# Patient Record
Sex: FEMALE | Race: BLACK OR AFRICAN AMERICAN | NOT HISPANIC OR LATINO | URBAN - METROPOLITAN AREA
[De-identification: names, ages, dates, MRNs, and addresses within clinical notes are randomized per-mention and may not be internally consistent; named-entity substitution may affect disease eponyms.]

---

## 2020-08-20 ENCOUNTER — OFFICE VISIT (OUTPATIENT)
Dept: URGENT CARE | Facility: CLINIC | Age: 60
End: 2020-08-20
Payer: MEDICARE

## 2020-08-20 VITALS
TEMPERATURE: 98 F | DIASTOLIC BLOOD PRESSURE: 76 MMHG | RESPIRATION RATE: 18 BRPM | OXYGEN SATURATION: 98 % | HEART RATE: 78 BPM | WEIGHT: 140 LBS | SYSTOLIC BLOOD PRESSURE: 146 MMHG

## 2020-08-20 DIAGNOSIS — N39.0 URINARY TRACT INFECTION WITHOUT HEMATURIA, SITE UNSPECIFIED: Primary | ICD-10-CM

## 2020-08-20 DIAGNOSIS — R30.0 DYSURIA: ICD-10-CM

## 2020-08-20 LAB
BILIRUB UR QL STRIP: NEGATIVE
GLUCOSE UR QL STRIP: NEGATIVE
KETONES UR QL STRIP: NEGATIVE
LEUKOCYTE ESTERASE UR QL STRIP: NEGATIVE
PH, POC UA: 7 (ref 5–8)
POC BLOOD, URINE: NEGATIVE
POC NITRATES, URINE: NEGATIVE
PROT UR QL STRIP: NEGATIVE
SP GR UR STRIP: 1.01 (ref 1–1.03)
UROBILINOGEN UR STRIP-ACNC: NORMAL (ref 0.1–1.1)

## 2020-08-20 PROCEDURE — 81003 URINALYSIS AUTO W/O SCOPE: CPT | Mod: QW,S$GLB,, | Performed by: NURSE PRACTITIONER

## 2020-08-20 PROCEDURE — 99203 OFFICE O/P NEW LOW 30 MIN: CPT | Mod: 25,S$GLB,, | Performed by: NURSE PRACTITIONER

## 2020-08-20 PROCEDURE — 87086 URINE CULTURE/COLONY COUNT: CPT

## 2020-08-20 PROCEDURE — 81003 POCT URINALYSIS, DIPSTICK, AUTOMATED, W/O SCOPE: ICD-10-PCS | Mod: QW,S$GLB,, | Performed by: NURSE PRACTITIONER

## 2020-08-20 PROCEDURE — 99203 PR OFFICE/OUTPT VISIT, NEW, LEVL III, 30-44 MIN: ICD-10-PCS | Mod: 25,S$GLB,, | Performed by: NURSE PRACTITIONER

## 2020-08-20 RX ORDER — CLOPIDOGREL BISULFATE 75 MG/1
75 TABLET ORAL
COMMUNITY
Start: 2020-05-13 | End: 2021-05-13

## 2020-08-20 RX ORDER — GABAPENTIN 400 MG/1
CAPSULE ORAL
COMMUNITY
Start: 2020-07-10

## 2020-08-20 RX ORDER — ATORVASTATIN CALCIUM 40 MG/1
40 TABLET, FILM COATED ORAL
COMMUNITY
Start: 2019-09-06 | End: 2020-09-05

## 2020-08-20 RX ORDER — METOPROLOL SUCCINATE 50 MG/1
50 TABLET, EXTENDED RELEASE ORAL
COMMUNITY

## 2020-08-20 RX ORDER — NITROFURANTOIN 25; 75 MG/1; MG/1
100 CAPSULE ORAL 2 TIMES DAILY
Qty: 10 CAPSULE | Refills: 0 | Status: SHIPPED | OUTPATIENT
Start: 2020-08-20 | End: 2020-08-25

## 2020-08-20 NOTE — PROGRESS NOTES
Subjective:       Patient ID: Daina Ngo is a 60 y.o. female.    Vitals:  weight is 63.5 kg (140 lb). Her temperature is 98.3 °F (36.8 °C). Her blood pressure is 146/76 (abnormal) and her pulse is 78. Her respiration is 18 and oxygen saturation is 98%.     Chief Complaint: Urinary Tract Infection    Pt presents to clinic today for evaluation of right lower back pain, dysuria, urinary urgency, frequency, and bladder pressure for the past few days. She is concerned that she may have developed a UTI. She has been drinking cranberry juice which has improved symptoms some. Pt does endorse some mild constipation and feeling gassy/bloated. Denies fever or abdominal pain. Last BM was this morning-- was small, brown, and formed.    Urinary Tract Infection   This is a new problem. She is not sexually active. Associated symptoms include frequency, urgency and constipation. Pertinent negatives include no behavior changes, chills, discharge, flank pain, hematuria, hesitancy, nausea, possible pregnancy, sweats, vomiting, weight loss, bubble bath use, rash or withholding. She has tried nothing for the symptoms.       Constitution: Negative. Negative for chills and fever.   HENT: Negative.    Neck: Negative for painful lymph nodes.   Eyes: Negative.    Gastrointestinal: Positive for constipation. Negative for abdominal pain, nausea and vomiting.   Genitourinary: Positive for frequency and urgency. Negative for dysuria, urine decreased, flank pain, hematuria, history of kidney stones, painful menstruation, irregular menstruation, missed menses, heavy menstrual bleeding, ovarian cysts, genital trauma, vaginal pain, vaginal discharge, vaginal bleeding, vaginal odor, painful intercourse, genital sore, painful ejaculation and pelvic pain.   Musculoskeletal: Positive for pain and back pain.   Skin: Negative for rash and lesion.   Hematologic/Lymphatic: Negative for swollen lymph nodes.       Objective:      Physical Exam    Constitutional: She is oriented to person, place, and time. She appears well-developed.  Non-toxic appearance. She does not appear ill. No distress.   HENT:   Head: Normocephalic and atraumatic.   Ears:   Right Ear: External ear normal.   Left Ear: External ear normal.   Nose: Nose normal. No nasal deformity. No epistaxis.   Mouth/Throat: Oropharynx is clear and moist and mucous membranes are normal.   Eyes: Lids are normal.   Neck: Trachea normal, normal range of motion and phonation normal. Neck supple.   Cardiovascular: Normal pulses.   Pulmonary/Chest: Effort normal.   Abdominal: Soft. Normal appearance and bowel sounds are normal. She exhibits no distension. There is no abdominal tenderness. There is no rebound, no guarding, no left CVA tenderness and no right CVA tenderness.      Comments: Abdomen soft, non-distended, and non-tender to palpation. No CVA tenderness.   Musculoskeletal:      Comments: Bilateral above the knee amputee. Sites well healed. Seen sitting in wheel chair today.   Neurological: She is alert and oriented to person, place, and time.   Skin: Skin is warm, dry, intact and not diaphoretic. Psychiatric: Her speech is normal and behavior is normal.   Nursing note and vitals reviewed.        Assessment:       1. Urinary tract infection without hematuria, site unspecified    2. Dysuria        Plan:         Urinary tract infection without hematuria, site unspecified  -     nitrofurantoin, macrocrystal-monohydrate, (MACROBID) 100 MG capsule; Take 1 capsule (100 mg total) by mouth 2 (two) times daily. for 5 days  Dispense: 10 capsule; Refill: 0  -     Urine culture    Dysuria  -     POCT Urinalysis, Dipstick, Automated, W/O Scope    Discussed urinalysis results, diagnosis, and treatment plan today. All applicable EKG, medical records, labs, imaging reviewed in Epic and discussed with patient. Instructed to follow up with PCP or go to ER if symptoms fail to improve or worsen. Pt verbalizes  "understanding.       Patient Instructions       PLEASE READ YOUR DISCHARGE INSTRUCTIONS ENTIRELY AS IT CONTAINS IMPORTANT INFORMATION.      Take the antibiotics to completion.     Drink plenty of fluids, wipe front to back, take showers not baths, no scented soaps, wear breathable cotton underwear, urinate after sexual intercourse.     A urine culture was sent. You will be contacted once it results and appropriate action will be taken if needed.     Please go to the ER for worsening symptoms including fever, worsening flank pain, vomiting, etc.       Please return or see your primary care doctor if you develop new or worsening symptoms.     Please arrange follow up with your primary medical clinic as soon as possible. You must understand that you've received an Urgent Care treatment only and that you may be released before all of your medical problems are known or treated. You, the patient, will arrange for follow up as instructed. If your symptoms worsen or fail to improve you should go to the Emergency Room.  WE CANNOT RULE OUT ALL POSSIBLE CAUSES OF YOUR SYMPTOMS IN THE URGENT CARE SETTING PLEASE GO TO THE ER IF YOU FEELS YOUR CONDITION IS WORSENING OR YOU WOULD LIKE EMERGENT EVALUATION.        Bladder Infection, Female (Adult)    Urine is normally doesn't have any bacteria in it. But bacteria can get into the urinary tract from the skin around the rectum. Or they can travel in the blood from elsewhere in the body. Once they are in your urinary tract, they can cause infection in the urethra (urethritis), the bladder (cystitis), or the kidneys (pyelonephritis).  The most common place for an infection is in the bladder. This is called a bladder infection. This is one of the most common infections in women. Most bladder infections are easily treated. They are not serious unless the infection spreads to the kidney.  The phrases "bladder infection," "UTI," and "cystitis" are often used to describe the same thing. But " they are not always the same. Cystitis is an inflammation of the bladder. The most common cause of cystitis is an infection.  Symptoms  The infection causes inflammation in the urethra and bladder. This causes many of the symptoms. The most common symptoms of a bladder infection are:  · Pain or burning when urinating  · Having to urinate more often than usual  · Urgent need to urinate  · Only a small amount of urine comes out  · Blood in urine  · Abdominal discomfort. This is usually in the lower abdomen above the pubic bone.  · Cloudy urine  · Strong- or bad-smelling urine  · Unable to urinate (urinary retention)  · Unable to hold urine in (urinary incontinence)  · Fever  · Loss of appetite  · Confusion (in older adults)  Causes  Bladder infections are not contagious. You can't get one from someone else, from a toilet seat, or from sharing a bath.  The most common cause of bladder infections is bacteria from the bowels. The bacteria get onto the skin around the opening of the urethra. From there, they can get into the urine and travel up to the bladder, causing inflammation and infection. This usually happens because of:  · Wiping improperly after urinating. Always wipe from front to back.  · Bowel incontinence  · Pregnancy  · Procedures such as having a catheter inserted  · Older age  · Not emptying your bladder. This can allow bacteria a chance to grow in your urine.  · Dehydration  · Constipation  · Sex  · Use of a diaphragm for birth control   Treatment  Bladder infections are diagnosed by a urine test. They are treated with antibiotics and usually clear up quickly without complications. Treatment helps prevent a more serious kidney infection.  Medicines  Medicines can help in the treatment of a bladder infection:  · Take antibiotics until they are used up, even if you feel better. It is important to finish them to make sure the infection has cleared.  · You can use acetaminophen or ibuprofen for pain, fever,  or discomfort, unless another medicine was prescribed. If you have chronic liver or kidney disease, talk with your healthcare provider before using these medicines. Also talk with your provider if you've ever had a stomach ulcer or gastrointestinal bleeding, or are taking blood-thinner medicines.  · If you are given phenazopydridine to reduce burning with urination, it will cause your urine to become a bright orange color. This can stain clothing.  Care and prevention  These self-care steps can help prevent future infections:  · Drink plenty of fluids to prevent dehydration and flush out your bladder. Do this unless you must restrict fluids for other health reasons, or your doctor told you not to.  · Proper cleaning after going to the bathroom is important. Wipe from front to back after using the toilet to prevent the spread of bacteria.  · Urinate more often. Don't try to hold urine in for a long time.  · Wear loose-fitting clothes and cotton underwear. Avoid tight-fitting pants.  · Improve your diet and prevent constipation. Eat more fresh fruit and vegetables, and fiber, and less junk and fatty foods.  · Avoid sex until your symptoms are gone.  · Avoid caffeine, alcohol, and spicy foods. These can irritate your bladder.  · Urinate right after intercourse to flush out your bladder.  · If you use birth control pills and have frequent bladder infections, discuss it with your doctor.  Follow-up care  Call your healthcare provider if all symptoms are not gone after 3 days of treatment. This is especially important if you have repeat infections.  If a culture was done, you will be told if your treatment needs to be changed. If directed, you can call to find out the results.  If X-rays were done, you will be told if the results will affect your treatment.  Call 911  Call 911 if any of the following occur:  · Trouble breathing  · Hard to wake up or confusion  · Fainting or loss of consciousness  · Rapid heart rate  When  to seek medical advice  Call your healthcare provider right away if any of these occur:  · Fever of 100.4ºF (38.0ºC) or higher, or as directed by your healthcare provider  · Symptoms are not better by the third day of treatment  · Back or belly (abdominal) pain that gets worse  · Repeated vomiting, or unable to keep medicine down  · Weakness or dizziness  · Vaginal discharge  · Pain, redness, or swelling in the outer vaginal area (labia)  Date Last Reviewed: 10/1/2016  © 5416-5584 [employer]. [address] [place] [address]. All rights reserved. This information is not intended as a substitute for professional medical care. Always follow your healthcare professional's instructions.

## 2020-08-21 NOTE — PATIENT INSTRUCTIONS
"    PLEASE READ YOUR DISCHARGE INSTRUCTIONS ENTIRELY AS IT CONTAINS IMPORTANT INFORMATION.      Take the antibiotics to completion.     Drink plenty of fluids, wipe front to back, take showers not baths, no scented soaps, wear breathable cotton underwear, urinate after sexual intercourse.     A urine culture was sent. You will be contacted once it results and appropriate action will be taken if needed.     Please go to the ER for worsening symptoms including fever, worsening flank pain, vomiting, etc.       Please return or see your primary care doctor if you develop new or worsening symptoms.     Please arrange follow up with your primary medical clinic as soon as possible. You must understand that you've received an Urgent Care treatment only and that you may be released before all of your medical problems are known or treated. You, the patient, will arrange for follow up as instructed. If your symptoms worsen or fail to improve you should go to the Emergency Room.  WE CANNOT RULE OUT ALL POSSIBLE CAUSES OF YOUR SYMPTOMS IN THE URGENT CARE SETTING PLEASE GO TO THE ER IF YOU FEELS YOUR CONDITION IS WORSENING OR YOU WOULD LIKE EMERGENT EVALUATION.        Bladder Infection, Female (Adult)    Urine is normally doesn't have any bacteria in it. But bacteria can get into the urinary tract from the skin around the rectum. Or they can travel in the blood from elsewhere in the body. Once they are in your urinary tract, they can cause infection in the urethra (urethritis), the bladder (cystitis), or the kidneys (pyelonephritis).  The most common place for an infection is in the bladder. This is called a bladder infection. This is one of the most common infections in women. Most bladder infections are easily treated. They are not serious unless the infection spreads to the kidney.  The phrases "bladder infection," "UTI," and "cystitis" are often used to describe the same thing. But they are not always the same. Cystitis is an " inflammation of the bladder. The most common cause of cystitis is an infection.  Symptoms  The infection causes inflammation in the urethra and bladder. This causes many of the symptoms. The most common symptoms of a bladder infection are:  · Pain or burning when urinating  · Having to urinate more often than usual  · Urgent need to urinate  · Only a small amount of urine comes out  · Blood in urine  · Abdominal discomfort. This is usually in the lower abdomen above the pubic bone.  · Cloudy urine  · Strong- or bad-smelling urine  · Unable to urinate (urinary retention)  · Unable to hold urine in (urinary incontinence)  · Fever  · Loss of appetite  · Confusion (in older adults)  Causes  Bladder infections are not contagious. You can't get one from someone else, from a toilet seat, or from sharing a bath.  The most common cause of bladder infections is bacteria from the bowels. The bacteria get onto the skin around the opening of the urethra. From there, they can get into the urine and travel up to the bladder, causing inflammation and infection. This usually happens because of:  · Wiping improperly after urinating. Always wipe from front to back.  · Bowel incontinence  · Pregnancy  · Procedures such as having a catheter inserted  · Older age  · Not emptying your bladder. This can allow bacteria a chance to grow in your urine.  · Dehydration  · Constipation  · Sex  · Use of a diaphragm for birth control   Treatment  Bladder infections are diagnosed by a urine test. They are treated with antibiotics and usually clear up quickly without complications. Treatment helps prevent a more serious kidney infection.  Medicines  Medicines can help in the treatment of a bladder infection:  · Take antibiotics until they are used up, even if you feel better. It is important to finish them to make sure the infection has cleared.  · You can use acetaminophen or ibuprofen for pain, fever, or discomfort, unless another medicine was  prescribed. If you have chronic liver or kidney disease, talk with your healthcare provider before using these medicines. Also talk with your provider if you've ever had a stomach ulcer or gastrointestinal bleeding, or are taking blood-thinner medicines.  · If you are given phenazopydridine to reduce burning with urination, it will cause your urine to become a bright orange color. This can stain clothing.  Care and prevention  These self-care steps can help prevent future infections:  · Drink plenty of fluids to prevent dehydration and flush out your bladder. Do this unless you must restrict fluids for other health reasons, or your doctor told you not to.  · Proper cleaning after going to the bathroom is important. Wipe from front to back after using the toilet to prevent the spread of bacteria.  · Urinate more often. Don't try to hold urine in for a long time.  · Wear loose-fitting clothes and cotton underwear. Avoid tight-fitting pants.  · Improve your diet and prevent constipation. Eat more fresh fruit and vegetables, and fiber, and less junk and fatty foods.  · Avoid sex until your symptoms are gone.  · Avoid caffeine, alcohol, and spicy foods. These can irritate your bladder.  · Urinate right after intercourse to flush out your bladder.  · If you use birth control pills and have frequent bladder infections, discuss it with your doctor.  Follow-up care  Call your healthcare provider if all symptoms are not gone after 3 days of treatment. This is especially important if you have repeat infections.  If a culture was done, you will be told if your treatment needs to be changed. If directed, you can call to find out the results.  If X-rays were done, you will be told if the results will affect your treatment.  Call 911  Call 911 if any of the following occur:  · Trouble breathing  · Hard to wake up or confusion  · Fainting or loss of consciousness  · Rapid heart rate  When to seek medical advice  Call your  healthcare provider right away if any of these occur:  · Fever of 100.4ºF (38.0ºC) or higher, or as directed by your healthcare provider  · Symptoms are not better by the third day of treatment  · Back or belly (abdominal) pain that gets worse  · Repeated vomiting, or unable to keep medicine down  · Weakness or dizziness  · Vaginal discharge  · Pain, redness, or swelling in the outer vaginal area (labia)  Date Last Reviewed: 10/1/2016  © 5952-4984 [employer]. [address] [place] [address]. All rights reserved. This information is not intended as a substitute for professional medical care. Always follow your healthcare professional's instructions.

## 2020-08-22 LAB — BACTERIA UR CULT: NO GROWTH

## 2024-12-23 ENCOUNTER — HOSPITAL ENCOUNTER (EMERGENCY)
Facility: OTHER | Age: 64
Discharge: HOME | End: 2024-12-23
Attending: EMERGENCY MEDICINE
Payer: MEDICARE

## 2024-12-23 VITALS
WEIGHT: 119.94 LBS | HEIGHT: 60 IN | TEMPERATURE: 98 F | BODY MASS INDEX: 23.55 KG/M2 | HEART RATE: 69 BPM | DIASTOLIC BLOOD PRESSURE: 85 MMHG | RESPIRATION RATE: 15 BRPM | OXYGEN SATURATION: 97 % | SYSTOLIC BLOOD PRESSURE: 191 MMHG

## 2024-12-23 DIAGNOSIS — I73.9 PERIPHERAL ARTERY DISEASE: Primary | ICD-10-CM

## 2024-12-23 DIAGNOSIS — L89.90 PRESSURE INJURY OF SKIN, UNSPECIFIED INJURY STAGE, UNSPECIFIED LOCATION: ICD-10-CM

## 2024-12-23 LAB
ALBUMIN SERPL BCP-MCNC: 3.3 G/DL (ref 3.5–5.2)
ALP SERPL-CCNC: 192 U/L (ref 40–150)
ALT SERPL W/O P-5'-P-CCNC: 30 U/L (ref 10–44)
ANION GAP SERPL CALC-SCNC: 13 MMOL/L (ref 8–16)
AST SERPL-CCNC: 47 U/L (ref 10–40)
BASOPHILS # BLD AUTO: 0.02 K/UL (ref 0–0.2)
BASOPHILS NFR BLD: 0.2 % (ref 0–1.9)
BILIRUB SERPL-MCNC: 0.7 MG/DL (ref 0.1–1)
BUN SERPL-MCNC: 8 MG/DL (ref 8–23)
CALCIUM SERPL-MCNC: 8.7 MG/DL (ref 8.7–10.5)
CHLORIDE SERPL-SCNC: 107 MMOL/L (ref 95–110)
CO2 SERPL-SCNC: 19 MMOL/L (ref 23–29)
CREAT SERPL-MCNC: 0.7 MG/DL (ref 0.5–1.4)
DIFFERENTIAL METHOD BLD: ABNORMAL
EOSINOPHIL # BLD AUTO: 0.1 K/UL (ref 0–0.5)
EOSINOPHIL NFR BLD: 0.6 % (ref 0–8)
ERYTHROCYTE [DISTWIDTH] IN BLOOD BY AUTOMATED COUNT: 13.8 % (ref 11.5–14.5)
EST. GFR  (NO RACE VARIABLE): >60 ML/MIN/1.73 M^2
GLUCOSE SERPL-MCNC: 101 MG/DL (ref 70–110)
HCT VFR BLD AUTO: 41.3 % (ref 37–48.5)
HGB BLD-MCNC: 14.2 G/DL (ref 12–16)
IMM GRANULOCYTES # BLD AUTO: 0.03 K/UL (ref 0–0.04)
IMM GRANULOCYTES NFR BLD AUTO: 0.3 % (ref 0–0.5)
LYMPHOCYTES # BLD AUTO: 2.8 K/UL (ref 1–4.8)
LYMPHOCYTES NFR BLD: 31.9 % (ref 18–48)
MCH RBC QN AUTO: 32.6 PG (ref 27–31)
MCHC RBC AUTO-ENTMCNC: 34.4 G/DL (ref 32–36)
MCV RBC AUTO: 95 FL (ref 82–98)
MONOCYTES # BLD AUTO: 0.9 K/UL (ref 0.3–1)
MONOCYTES NFR BLD: 10.9 % (ref 4–15)
NEUTROPHILS # BLD AUTO: 4.8 K/UL (ref 1.8–7.7)
NEUTROPHILS NFR BLD: 56.1 % (ref 38–73)
NRBC BLD-RTO: 0 /100 WBC
PLATELET # BLD AUTO: 177 K/UL (ref 150–450)
PMV BLD AUTO: 9.8 FL (ref 9.2–12.9)
POTASSIUM SERPL-SCNC: 3.5 MMOL/L (ref 3.5–5.1)
PROT SERPL-MCNC: 9.4 G/DL (ref 6–8.4)
RBC # BLD AUTO: 4.35 M/UL (ref 4–5.4)
SODIUM SERPL-SCNC: 139 MMOL/L (ref 136–145)
WBC # BLD AUTO: 8.63 K/UL (ref 3.9–12.7)

## 2024-12-23 PROCEDURE — 80053 COMPREHEN METABOLIC PANEL: CPT | Performed by: PHYSICIAN ASSISTANT

## 2024-12-23 PROCEDURE — 25000003 PHARM REV CODE 250

## 2024-12-23 PROCEDURE — 36415 COLL VENOUS BLD VENIPUNCTURE: CPT | Performed by: PHYSICIAN ASSISTANT

## 2024-12-23 PROCEDURE — 85025 COMPLETE CBC W/AUTO DIFF WBC: CPT | Performed by: PHYSICIAN ASSISTANT

## 2024-12-23 PROCEDURE — 99283 EMERGENCY DEPT VISIT LOW MDM: CPT

## 2024-12-23 RX ORDER — HYDROCODONE BITARTRATE AND ACETAMINOPHEN 10; 325 MG/1; MG/1
1 TABLET ORAL EVERY 6 HOURS PRN
Qty: 10 TABLET | Refills: 0 | Status: SHIPPED | OUTPATIENT
Start: 2024-12-23 | End: 2024-12-26

## 2024-12-23 RX ORDER — HYDROCODONE BITARTRATE AND ACETAMINOPHEN 10; 325 MG/1; MG/1
1 TABLET ORAL
Status: COMPLETED | OUTPATIENT
Start: 2024-12-23 | End: 2024-12-23

## 2024-12-23 RX ORDER — ACETAMINOPHEN 325 MG/1
650 TABLET ORAL
Status: DISCONTINUED | OUTPATIENT
Start: 2024-12-23 | End: 2024-12-23

## 2024-12-23 RX ORDER — ACETAMINOPHEN 325 MG/1
650 TABLET ORAL
Status: COMPLETED | OUTPATIENT
Start: 2024-12-23 | End: 2024-12-23

## 2024-12-23 RX ADMIN — ACETAMINOPHEN 650 MG: 325 TABLET, FILM COATED ORAL at 06:12

## 2024-12-23 RX ADMIN — COLLAGENASE SANTYL: 250 OINTMENT TOPICAL at 07:12

## 2024-12-23 RX ADMIN — HYDROCODONE BITARTRATE AND ACETAMINOPHEN 1 TABLET: 10; 325 TABLET ORAL at 07:12

## 2024-12-23 NOTE — FIRST PROVIDER EVALUATION
Emergency Department TeleTriage Encounter Note      CHIEF COMPLAINT    Chief Complaint   Patient presents with    Rash     And multiple wounds to bilateral legs       VITAL SIGNS   Initial Vitals [12/23/24 1543]   BP Pulse Resp Temp SpO2   (!) 213/92 81 16 98.1 °F (36.7 °C) 100 %      MAP       --            ALLERGIES    Review of patient's allergies indicates:  No Known Allergies    PROVIDER TRIAGE NOTE  Patient presents with complaint of worsening wounds. Seen at [facility] on 12/9 for the same. Suppose to see wound care on 12/31. Not taking her HTN medications currently. Denied fever.      Phy:   Constitutional: well nourished, well developed, appearing stated age, NAD        Initial orders will be placed and care will be transferred to an alternate provider when patient is roomed for a full evaluation. Any additional orders and the final disposition will be determined by that provider.        ORDERS  Labs Reviewed - No data to display    ED Orders (720h ago, onward)      None              Virtual Visit Note: The provider triage portion of this emergency department evaluation and documentation was performed via [employer], a HIPAA-compliant telemedicine application, in concert with a tele-presenter in the room. A face to face patient evaluation with one of my colleagues will occur once the patient is placed in an emergency department room.      DISCLAIMER: This note was prepared with [employer] voice recognition transcription software. Garbled syntax, mangled pronouns, and other bizarre constructions may be attributed to that software system.

## 2024-12-24 NOTE — DISCHARGE INSTRUCTIONS
Discontinue use of Tramadol. Start taking Norco 10 for your pain. Keep the pressure wounds clean. You can reapply ointment and change dressing once every 2 two days. Follow up with wound care. Your appointment is on 12/30 at [facility].    I also provided a new referral to wound care at [facility] as a back up option.

## 2024-12-24 NOTE — ED PROVIDER NOTES
Encounter Date: 12/23/2024       History     Chief Complaint   Patient presents with    Rash     And multiple wounds to bilateral legs     Daina Ngo is a 64 y.o. female with history of CHF, hyperlipidemia, hypertension, peripheral vascular disease, bilateral AKA presenting to the emergency department for multiple wounds to her left leg, right thigh, and right, lateral buttock that has been present for a month. She reports the wounds are painful and red. She reports itching to the areas that are red. She reports the wound on the right thigh has been draining. She denies recent trauma to the legs. States that she was seen at [facility] for this on 12/9/24 after a fall and was provided referral to wound care, but she states that her appointment is not until 12/30/24. She denies fever or chills. She denies numbness or tingling to the area. No pain relief with ibuprofen.       The history is provided by the patient (daughter at bedside).     Review of patient's allergies indicates:  No Known Allergies  Past Medical History:   Diagnosis Date    PAD (peripheral artery disease)      Past Surgical History:   Procedure Laterality Date    LEG AMPUTATION       No family history on file.  Social History     Tobacco Use    Smoking status: Never    Smokeless tobacco: Never   Substance Use Topics    Alcohol use: Yes    Drug use: Not Currently     Review of Systems  As per HPI.     Physical Exam     Initial Vitals [12/23/24 1543]   BP Pulse Resp Temp SpO2   (!) 213/92 81 16 98.1 °F (36.7 °C) 100 %      MAP       --         Physical Exam    Nursing note and vitals reviewed.  Constitutional: She appears well-developed and well-nourished. No distress.   HENT:   Head: Normocephalic and atraumatic.   Eyes: Conjunctivae and EOM are normal.   Neck: Neck supple.   Normal range of motion.  Cardiovascular:  Normal rate, regular rhythm, normal heart sounds and intact distal pulses.           Pulmonary/Chest: Breath sounds normal. No  respiratory distress. She has no wheezes. She has no rhonchi. She has no rales.   Musculoskeletal:         General: Normal range of motion.      Cervical back: Normal range of motion and neck supple.     Neurological: She is alert and oriented to person, place, and time. She has normal strength.   Skin: Skin is warm and dry.   Wound with eschar to left stub. No drainage or oozing. Small, circular wound with scabbing to right medial thigh. Large, circular wound with eschar to right medial thigh.  Minimal draining. Wound with eschar to right lateral hip. Pressure ulcer to gluteal cleft. No surrounding cellulitis.    Psychiatric: She has a normal mood and affect. Her behavior is normal. Judgment and thought content normal.         ED Course   Procedures  Labs Reviewed   CBC W/ AUTO DIFFERENTIAL - Abnormal       Result Value    WBC 8.63      RBC 4.35      Hemoglobin 14.2      Hematocrit 41.3      MCV 95      MCH 32.6 (*)     MCHC 34.4      RDW 13.8      Platelets 177      MPV 9.8      Immature Granulocytes 0.3      Gran # (ANC) 4.8      Immature Grans (Abs) 0.03      Lymph # 2.8      Mono # 0.9      Eos # 0.1      Baso # 0.02      nRBC 0      Gran % 56.1      Lymph % 31.9      Mono % 10.9      Eosinophil % 0.6      Basophil % 0.2      Differential Method Automated     COMPREHENSIVE METABOLIC PANEL - Abnormal    Sodium 139      Potassium 3.5      Chloride 107      CO2 19 (*)     Glucose 101      BUN 8      Creatinine 0.7      Calcium 8.7      Total Protein 9.4 (*)     Albumin 3.3 (*)     Total Bilirubin 0.7      Alkaline Phosphatase 192 (*)     AST 47 (*)     ALT 30      eGFR >60      Anion Gap 13            Imaging Results    None          Medications   acetaminophen tablet 650 mg (650 mg Oral Given 12/23/24 1849)   HYDROcodone-acetaminophen  mg per tablet 1 tablet (1 tablet Oral Given 12/23/24 1945)   collagenase ointment ( Topical (Top) Given 12/23/24 1945)     Medical Decision Making  Amount and/or Complexity  of Data Reviewed  Labs: ordered.    Risk  OTC drugs.  Prescription drug management.                          Medical Decision Making:   Initial Assessment:   Urgent evaluation of 64 y.o. female with history of CHF, hyperlipidemia, hypertension, peripheral vascular disease, bilateral AKA presenting with multiple, painful wounds to her left leg, right thigh, and right lateral buttock that has been present for a month.  Denies recent injury to the legs.  She denies fever, chills, numbness, or tingling.  States that she is wheelchair-bound but is very independent.  States that she was seen at [facility] on 12/09/2024 after a fall out of her wheelchair while transferring.  States that her wounds were evaluated and she was discharged with ibuprofen which has not provided pain relief.  States that she was given a referral to wound care, but her appointment is not until 12/30/2024.  States that her pain too severe to wait for the appointment.  On exam, she is uncomfortable appearing but nontoxic.  Hemodynamically stable.  Afebrile in the ED. Wound with eschar to left stub. No drainage or oozing. Small, circular wound with scabbing to right medial thigh. Large, circular wound with eschar to right medial thigh.  Minimal draining. Wound with eschar to right lateral hip. Pressure ulcer to gluteal cleft. No surrounding cellulitis.  Plan for labs to rule out infection.  ED Management:  On review of labs, no leukocytosis.  H&H within normal limits.  Normal platelet count.  CMP is unremarkable.    Case was discussed with supervising physician,  who recommended pain control with narcotics and Santyl cream with Mepilex pressure dressing.    On reassessment, patient reports significant improvement of her pain with central cream, pressure dressing, and Norco 10.  She was counseled on proper wound care.  Provided prescription for few tablets of Norco 10.  Advised her to discontinue use of tramadol if she is taking Norco 10.   Also provided prescription for Santyl cream.  Advised her to her appointment with wound care on 12/30/2024.  She was also given a referral to [facility] wound care as a backup option.  Patient verbalized understanding and agreement with this plan of care. She was given specific return precautions. Advised to follow up with PCP as needed. All questions and concerns addressed. She is stable for discharge.     This note was created with MModal Fluency Direct Dictation. Please excuse any spelling or grammatical errors.             Clinical Impression:  Final diagnoses:  [I73.9] Peripheral artery disease (Primary)  [L89.90] Pressure injury of skin, unspecified injury stage, unspecified location          ED Disposition Condition    Discharge Stable          ED Prescriptions       Medication Sig Dispense Start Date End Date Auth. Provider    HYDROcodone-acetaminophen (NORCO)  mg per tablet Take 1 tablet by mouth every 6 (six) hours as needed for Pain. 10 tablet 12/23/2024 12/26/2024 Issac Can PA-C    collagenase (SANTYL) ointment Apply topically once daily. for 21 days 30 g 12/23/2024 1/13/2025 Issac Can PA-C          Follow-up Information    None          Issac Can PA-C  12/23/24 1097

## 2025-01-27 ENCOUNTER — HOSPITAL ENCOUNTER (INPATIENT)
Facility: HOSPITAL | Age: 65
LOS: 3 days | Discharge: HOME HEALTH | DRG: 593 | End: 2025-01-31
Attending: EMERGENCY MEDICINE | Admitting: EMERGENCY MEDICINE
Payer: MEDICARE

## 2025-01-27 DIAGNOSIS — L08.9 WOUND INFECTION: ICD-10-CM

## 2025-01-27 DIAGNOSIS — T14.8XXA WOUND INFECTION: ICD-10-CM

## 2025-01-27 DIAGNOSIS — L89.90 PRESSURE INJURY OF SKIN, UNSPECIFIED INJURY STAGE, UNSPECIFIED LOCATION: Primary | ICD-10-CM

## 2025-01-27 DIAGNOSIS — R07.9 CHEST PAIN: ICD-10-CM

## 2025-01-27 DIAGNOSIS — I50.9 CHF (CONGESTIVE HEART FAILURE): ICD-10-CM

## 2025-01-27 DIAGNOSIS — E87.6 HYPOKALEMIA: ICD-10-CM

## 2025-01-27 LAB
ALBUMIN SERPL BCP-MCNC: 2.5 G/DL (ref 3.5–5.2)
ALP SERPL-CCNC: 88 U/L (ref 40–150)
ALT SERPL W/O P-5'-P-CCNC: 59 U/L (ref 10–44)
ANION GAP SERPL CALC-SCNC: 14 MMOL/L (ref 8–16)
AST SERPL-CCNC: 197 U/L (ref 10–40)
BACTERIA #/AREA URNS AUTO: ABNORMAL /HPF
BASOPHILS # BLD AUTO: 0.02 K/UL (ref 0–0.2)
BASOPHILS NFR BLD: 0.1 % (ref 0–1.9)
BILIRUB SERPL-MCNC: 1.2 MG/DL (ref 0.1–1)
BILIRUB UR QL STRIP: NEGATIVE
BIPAP: 0
BUN SERPL-MCNC: 23 MG/DL (ref 8–23)
CALCIUM SERPL-MCNC: 8.5 MG/DL (ref 8.7–10.5)
CHLORIDE SERPL-SCNC: 94 MMOL/L (ref 95–110)
CLARITY UR REFRACT.AUTO: ABNORMAL
CO2 SERPL-SCNC: 27 MMOL/L (ref 23–29)
COLOR UR AUTO: YELLOW
CREAT SERPL-MCNC: 0.6 MG/DL (ref 0.5–1.4)
DIFFERENTIAL METHOD BLD: ABNORMAL
EOSINOPHIL # BLD AUTO: 0 K/UL (ref 0–0.5)
EOSINOPHIL NFR BLD: 0 % (ref 0–8)
ERYTHROCYTE [DISTWIDTH] IN BLOOD BY AUTOMATED COUNT: 14 % (ref 11.5–14.5)
EST. GFR  (NO RACE VARIABLE): >60 ML/MIN/1.73 M^2
FIO2: 21 %
GLUCOSE SERPL-MCNC: 118 MG/DL (ref 70–110)
GLUCOSE UR QL STRIP: NEGATIVE
HCT VFR BLD AUTO: 38.6 % (ref 37–48.5)
HGB BLD-MCNC: 13 G/DL (ref 12–16)
HGB UR QL STRIP: ABNORMAL
HYALINE CASTS UR QL AUTO: 0 /LPF
IMM GRANULOCYTES # BLD AUTO: 0.04 K/UL (ref 0–0.04)
IMM GRANULOCYTES NFR BLD AUTO: 0.3 % (ref 0–0.5)
KETONES UR QL STRIP: NEGATIVE
LDH SERPL L TO P-CCNC: 2.9 MMOL/L
LEUKOCYTE ESTERASE UR QL STRIP: ABNORMAL
LYMPHOCYTES # BLD AUTO: 2.7 K/UL (ref 1–4.8)
LYMPHOCYTES NFR BLD: 19.2 % (ref 18–48)
MCH RBC QN AUTO: 31.9 PG (ref 27–31)
MCHC RBC AUTO-ENTMCNC: 33.7 G/DL (ref 32–36)
MCV RBC AUTO: 95 FL (ref 82–98)
MICROSCOPIC COMMENT: ABNORMAL
MONOCYTES # BLD AUTO: 0.8 K/UL (ref 0.3–1)
MONOCYTES NFR BLD: 6 % (ref 4–15)
NEUTROPHILS # BLD AUTO: 10.5 K/UL (ref 1.8–7.7)
NEUTROPHILS NFR BLD: 74.4 % (ref 38–73)
NITRITE UR QL STRIP: NEGATIVE
NRBC BLD-RTO: 0 /100 WBC
PH UR STRIP: 7 [PH] (ref 5–8)
PLATELET # BLD AUTO: 217 K/UL (ref 150–450)
PMV BLD AUTO: 9.5 FL (ref 9.2–12.9)
POC PERFORMED BY: NORMAL
POC TEMPERATURE: 37 C
POTASSIUM SERPL-SCNC: 2.8 MMOL/L (ref 3.5–5.1)
PROT SERPL-MCNC: 8.1 G/DL (ref 6–8.4)
PROT UR QL STRIP: ABNORMAL
RBC # BLD AUTO: 4.07 M/UL (ref 4–5.4)
RBC #/AREA URNS AUTO: 68 /HPF (ref 0–4)
SODIUM SERPL-SCNC: 135 MMOL/L (ref 136–145)
SP GR UR STRIP: 1.03 (ref 1–1.03)
SPECIMEN SOURCE: NORMAL
SQUAMOUS #/AREA URNS AUTO: 0 /HPF
UNSPECIFIED CRY UR QL COMP ASSIST: 4
URN SPEC COLLECT METH UR: ABNORMAL
WBC # BLD AUTO: 14.08 K/UL (ref 3.9–12.7)
WBC #/AREA URNS AUTO: 38 /HPF (ref 0–5)
YEAST UR QL AUTO: ABNORMAL

## 2025-01-27 PROCEDURE — 63600175 PHARM REV CODE 636 W HCPCS: Performed by: EMERGENCY MEDICINE

## 2025-01-27 PROCEDURE — 63600175 PHARM REV CODE 636 W HCPCS: Mod: TB

## 2025-01-27 PROCEDURE — 81001 URINALYSIS AUTO W/SCOPE: CPT | Performed by: EMERGENCY MEDICINE

## 2025-01-27 PROCEDURE — 25000003 PHARM REV CODE 250

## 2025-01-27 PROCEDURE — 87086 URINE CULTURE/COLONY COUNT: CPT | Performed by: EMERGENCY MEDICINE

## 2025-01-27 PROCEDURE — 25000003 PHARM REV CODE 250: Performed by: EMERGENCY MEDICINE

## 2025-01-27 PROCEDURE — 99900035 HC TECH TIME PER 15 MIN (STAT)

## 2025-01-27 PROCEDURE — 96365 THER/PROPH/DIAG IV INF INIT: CPT

## 2025-01-27 PROCEDURE — 83605 ASSAY OF LACTIC ACID: CPT

## 2025-01-27 PROCEDURE — 82803 BLOOD GASES ANY COMBINATION: CPT

## 2025-01-27 PROCEDURE — 96375 TX/PRO/DX INJ NEW DRUG ADDON: CPT

## 2025-01-27 PROCEDURE — 87040 BLOOD CULTURE FOR BACTERIA: CPT

## 2025-01-27 PROCEDURE — 80053 COMPREHEN METABOLIC PANEL: CPT | Performed by: EMERGENCY MEDICINE

## 2025-01-27 PROCEDURE — 96366 THER/PROPH/DIAG IV INF ADDON: CPT

## 2025-01-27 PROCEDURE — 25500020 PHARM REV CODE 255: Performed by: EMERGENCY MEDICINE

## 2025-01-27 PROCEDURE — 85025 COMPLETE CBC W/AUTO DIFF WBC: CPT | Performed by: EMERGENCY MEDICINE

## 2025-01-27 PROCEDURE — 99285 EMERGENCY DEPT VISIT HI MDM: CPT | Mod: 25

## 2025-01-27 RX ORDER — HYDROMORPHONE HYDROCHLORIDE 1 MG/ML
0.5 INJECTION, SOLUTION INTRAMUSCULAR; INTRAVENOUS; SUBCUTANEOUS
Status: COMPLETED | OUTPATIENT
Start: 2025-01-27 | End: 2025-01-27

## 2025-01-27 RX ADMIN — POTASSIUM BICARBONATE 40 MEQ: 391 TABLET, EFFERVESCENT ORAL at 11:01

## 2025-01-27 RX ADMIN — HYDROMORPHONE HYDROCHLORIDE 0.5 MG: 1 INJECTION, SOLUTION INTRAMUSCULAR; INTRAVENOUS; SUBCUTANEOUS at 10:01

## 2025-01-27 RX ADMIN — PIPERACILLIN AND TAZOBACTAM 4.5 G: 4; .5 INJECTION, POWDER, LYOPHILIZED, FOR SOLUTION INTRAVENOUS; PARENTERAL at 10:01

## 2025-01-27 RX ADMIN — SODIUM CHLORIDE 500 ML: 9 INJECTION, SOLUTION INTRAVENOUS at 10:01

## 2025-01-27 RX ADMIN — IOHEXOL 100 ML: 350 INJECTION, SOLUTION INTRAVENOUS at 10:01

## 2025-01-28 PROBLEM — R74.01 TRANSAMINITIS: Status: ACTIVE | Noted: 2025-01-28

## 2025-01-28 PROBLEM — R33.9 URINARY RETENTION: Status: ACTIVE | Noted: 2025-01-28

## 2025-01-28 PROBLEM — Z95.1 S/P CABG X 4: Status: ACTIVE | Noted: 2018-12-31

## 2025-01-28 PROBLEM — E78.5 HYPERLIPIDEMIA: Status: ACTIVE | Noted: 2018-08-02

## 2025-01-28 PROBLEM — K55.1 SUPERIOR MESENTERIC ARTERY STENOSIS: Status: ACTIVE | Noted: 2025-01-28

## 2025-01-28 PROBLEM — I10 BENIGN ESSENTIAL HTN: Status: ACTIVE | Noted: 2025-01-28

## 2025-01-28 PROBLEM — L89.90 PRESSURE INJURY OF SKIN: Status: ACTIVE | Noted: 2024-12-11

## 2025-01-28 PROBLEM — Z89.612 S/P AKA (ABOVE KNEE AMPUTATION) BILATERAL: Status: ACTIVE | Noted: 2018-08-08

## 2025-01-28 PROBLEM — L89.159 PRESSURE INJURY OF SKIN OF SACRAL REGION: Status: ACTIVE | Noted: 2025-01-28

## 2025-01-28 PROBLEM — I50.32 DIASTOLIC CHF, CHRONIC: Status: ACTIVE | Noted: 2018-08-02

## 2025-01-28 PROBLEM — E78.2 MIXED HYPERLIPIDEMIA: Status: ACTIVE | Noted: 2018-08-02

## 2025-01-28 PROBLEM — Z89.611 S/P AKA (ABOVE KNEE AMPUTATION) BILATERAL: Status: ACTIVE | Noted: 2018-08-08

## 2025-01-28 PROBLEM — E87.6 HYPOKALEMIA: Status: ACTIVE | Noted: 2025-01-28

## 2025-01-28 PROBLEM — R53.81 DEBILITY: Status: ACTIVE | Noted: 2025-01-28

## 2025-01-28 PROBLEM — L89.40: Status: ACTIVE | Noted: 2024-12-11

## 2025-01-28 PROBLEM — I70.0 AORTIC OCCLUSION: Status: ACTIVE | Noted: 2025-01-28

## 2025-01-28 PROBLEM — I74.09 LERICHE SYNDROME: Status: ACTIVE | Noted: 2025-01-28

## 2025-01-28 PROBLEM — D72.829 LEUKOCYTOSIS: Status: ACTIVE | Noted: 2025-01-28

## 2025-01-28 PROBLEM — I70.209 FEMORAL ARTERY OCCLUSION: Status: ACTIVE | Noted: 2025-01-28

## 2025-01-28 PROBLEM — G62.9 PERIPHERAL NEUROPATHY: Status: ACTIVE | Noted: 2018-08-02

## 2025-01-28 PROBLEM — Z93.59 SUPRAPUBIC CATHETER: Status: ACTIVE | Noted: 2025-01-28

## 2025-01-28 PROBLEM — S81.801A OPEN WOUND OF RIGHT LOWER EXTREMITY WITH COMPLICATION: Status: ACTIVE | Noted: 2025-01-28

## 2025-01-28 LAB
ALBUMIN SERPL BCP-MCNC: 1.8 G/DL (ref 3.5–5.2)
ALP SERPL-CCNC: 59 U/L (ref 40–150)
ALT SERPL W/O P-5'-P-CCNC: 43 U/L (ref 10–44)
ANION GAP SERPL CALC-SCNC: 12 MMOL/L (ref 8–16)
AST SERPL-CCNC: 135 U/L (ref 10–40)
BASOPHILS # BLD AUTO: 0.01 K/UL (ref 0–0.2)
BASOPHILS NFR BLD: 0.1 % (ref 0–1.9)
BILIRUB SERPL-MCNC: 1 MG/DL (ref 0.1–1)
BUN SERPL-MCNC: 15 MG/DL (ref 8–23)
CALCIUM SERPL-MCNC: 6.4 MG/DL (ref 8.7–10.5)
CHLORIDE SERPL-SCNC: 96 MMOL/L (ref 95–110)
CO2 SERPL-SCNC: 23 MMOL/L (ref 23–29)
CREAT SERPL-MCNC: 0.6 MG/DL (ref 0.5–1.4)
DIFFERENTIAL METHOD BLD: ABNORMAL
EOSINOPHIL # BLD AUTO: 0 K/UL (ref 0–0.5)
EOSINOPHIL NFR BLD: 0.1 % (ref 0–8)
ERYTHROCYTE [DISTWIDTH] IN BLOOD BY AUTOMATED COUNT: 14.5 % (ref 11.5–14.5)
EST. GFR  (NO RACE VARIABLE): >60 ML/MIN/1.73 M^2
GLUCOSE SERPL-MCNC: 375 MG/DL (ref 70–110)
HCT VFR BLD AUTO: 33.6 % (ref 37–48.5)
HGB BLD-MCNC: 11.3 G/DL (ref 12–16)
IMM GRANULOCYTES # BLD AUTO: 0.07 K/UL (ref 0–0.04)
IMM GRANULOCYTES NFR BLD AUTO: 0.6 % (ref 0–0.5)
LYMPHOCYTES # BLD AUTO: 1.7 K/UL (ref 1–4.8)
LYMPHOCYTES NFR BLD: 13.9 % (ref 18–48)
MCH RBC QN AUTO: 32.3 PG (ref 27–31)
MCHC RBC AUTO-ENTMCNC: 33.6 G/DL (ref 32–36)
MCV RBC AUTO: 96 FL (ref 82–98)
MONOCYTES # BLD AUTO: 0.8 K/UL (ref 0.3–1)
MONOCYTES NFR BLD: 6.3 % (ref 4–15)
NEUTROPHILS # BLD AUTO: 9.9 K/UL (ref 1.8–7.7)
NEUTROPHILS NFR BLD: 79 % (ref 38–73)
NRBC BLD-RTO: 0 /100 WBC
PLATELET # BLD AUTO: 189 K/UL (ref 150–450)
PMV BLD AUTO: 10.4 FL (ref 9.2–12.9)
POCT GLUCOSE: 110 MG/DL (ref 70–110)
POCT GLUCOSE: 88 MG/DL (ref 70–110)
POTASSIUM SERPL-SCNC: 2.4 MMOL/L (ref 3.5–5.1)
PROT SERPL-MCNC: 6 G/DL (ref 6–8.4)
RBC # BLD AUTO: 3.5 M/UL (ref 4–5.4)
SODIUM SERPL-SCNC: 131 MMOL/L (ref 136–145)
WBC # BLD AUTO: 12.46 K/UL (ref 3.9–12.7)

## 2025-01-28 PROCEDURE — 96375 TX/PRO/DX INJ NEW DRUG ADDON: CPT

## 2025-01-28 PROCEDURE — 85025 COMPLETE CBC W/AUTO DIFF WBC: CPT

## 2025-01-28 PROCEDURE — 25000003 PHARM REV CODE 250: Performed by: INTERNAL MEDICINE

## 2025-01-28 PROCEDURE — 80053 COMPREHEN METABOLIC PANEL: CPT | Performed by: INTERNAL MEDICINE

## 2025-01-28 PROCEDURE — 25000003 PHARM REV CODE 250

## 2025-01-28 PROCEDURE — 25000003 PHARM REV CODE 250: Performed by: EMERGENCY MEDICINE

## 2025-01-28 PROCEDURE — 96366 THER/PROPH/DIAG IV INF ADDON: CPT

## 2025-01-28 PROCEDURE — 12000002 HC ACUTE/MED SURGE SEMI-PRIVATE ROOM

## 2025-01-28 PROCEDURE — 99223 1ST HOSP IP/OBS HIGH 75: CPT | Mod: ,,,

## 2025-01-28 PROCEDURE — 63600175 PHARM REV CODE 636 W HCPCS

## 2025-01-28 PROCEDURE — 63600175 PHARM REV CODE 636 W HCPCS: Performed by: INTERNAL MEDICINE

## 2025-01-28 PROCEDURE — 63600175 PHARM REV CODE 636 W HCPCS: Performed by: EMERGENCY MEDICINE

## 2025-01-28 PROCEDURE — S4991 NICOTINE PATCH NONLEGEND: HCPCS

## 2025-01-28 RX ORDER — POTASSIUM CHLORIDE 7.45 MG/ML
10 INJECTION INTRAVENOUS
Status: COMPLETED | OUTPATIENT
Start: 2025-01-28 | End: 2025-01-28

## 2025-01-28 RX ORDER — CHLORTHALIDONE 25 MG/1
1 TABLET ORAL DAILY
Status: ON HOLD | COMMUNITY
Start: 2024-12-11 | End: 2025-02-06 | Stop reason: HOSPADM

## 2025-01-28 RX ORDER — CHLORTHALIDONE 25 MG/1
25 TABLET ORAL DAILY
Status: DISCONTINUED | OUTPATIENT
Start: 2025-01-29 | End: 2025-01-28

## 2025-01-28 RX ORDER — CALCIUM CARBONATE 200(500)MG
1000 TABLET,CHEWABLE ORAL 2 TIMES DAILY
Status: DISCONTINUED | OUTPATIENT
Start: 2025-01-28 | End: 2025-01-31 | Stop reason: HOSPADM

## 2025-01-28 RX ORDER — SODIUM CHLORIDE 0.9 % (FLUSH) 0.9 %
10 SYRINGE (ML) INJECTION EVERY 12 HOURS PRN
Status: DISCONTINUED | OUTPATIENT
Start: 2025-01-28 | End: 2025-01-31 | Stop reason: HOSPADM

## 2025-01-28 RX ORDER — ONDANSETRON 8 MG/1
8 TABLET, ORALLY DISINTEGRATING ORAL EVERY 8 HOURS PRN
Status: DISCONTINUED | OUTPATIENT
Start: 2025-01-28 | End: 2025-01-31 | Stop reason: HOSPADM

## 2025-01-28 RX ORDER — OXYCODONE HYDROCHLORIDE 10 MG/1
10 TABLET ORAL EVERY 4 HOURS PRN
Status: DISCONTINUED | OUTPATIENT
Start: 2025-01-28 | End: 2025-01-29

## 2025-01-28 RX ORDER — METOPROLOL SUCCINATE 50 MG/1
50 TABLET, EXTENDED RELEASE ORAL DAILY
Status: DISCONTINUED | OUTPATIENT
Start: 2025-01-28 | End: 2025-01-31 | Stop reason: HOSPADM

## 2025-01-28 RX ORDER — IBUPROFEN 200 MG
1 TABLET ORAL DAILY
Status: DISCONTINUED | OUTPATIENT
Start: 2025-01-28 | End: 2025-01-31 | Stop reason: HOSPADM

## 2025-01-28 RX ORDER — POLYETHYLENE GLYCOL 3350 17 G/17G
17 POWDER, FOR SOLUTION ORAL DAILY
Status: DISCONTINUED | OUTPATIENT
Start: 2025-01-28 | End: 2025-01-31 | Stop reason: HOSPADM

## 2025-01-28 RX ORDER — ATORVASTATIN CALCIUM 40 MG/1
40 TABLET, FILM COATED ORAL DAILY
Status: DISCONTINUED | OUTPATIENT
Start: 2025-01-28 | End: 2025-01-29

## 2025-01-28 RX ORDER — LOSARTAN POTASSIUM 50 MG/1
1 TABLET ORAL DAILY
Status: ON HOLD | COMMUNITY
Start: 2024-11-04 | End: 2025-02-06 | Stop reason: HOSPADM

## 2025-01-28 RX ORDER — MORPHINE SULFATE 4 MG/ML
4 INJECTION, SOLUTION INTRAMUSCULAR; INTRAVENOUS
Status: COMPLETED | OUTPATIENT
Start: 2025-01-28 | End: 2025-01-28

## 2025-01-28 RX ORDER — GLUCAGON 1 MG
1 KIT INJECTION
Status: DISCONTINUED | OUTPATIENT
Start: 2025-01-28 | End: 2025-01-31 | Stop reason: HOSPADM

## 2025-01-28 RX ORDER — AMOXICILLIN AND CLAVULANATE POTASSIUM 875; 125 MG/1; MG/1
1 TABLET, FILM COATED ORAL 2 TIMES DAILY
Status: ON HOLD | COMMUNITY
Start: 2025-01-20 | End: 2025-01-31

## 2025-01-28 RX ORDER — GABAPENTIN 400 MG/1
400 CAPSULE ORAL 3 TIMES DAILY
Status: DISCONTINUED | OUTPATIENT
Start: 2025-01-28 | End: 2025-01-31 | Stop reason: HOSPADM

## 2025-01-28 RX ORDER — DOXYCYCLINE 100 MG/1
100 CAPSULE ORAL EVERY 12 HOURS
Status: ON HOLD | COMMUNITY
Start: 2025-01-20 | End: 2025-01-31

## 2025-01-28 RX ORDER — MAGNESIUM SULFATE HEPTAHYDRATE 40 MG/ML
2 INJECTION, SOLUTION INTRAVENOUS
Status: COMPLETED | OUTPATIENT
Start: 2025-01-28 | End: 2025-01-28

## 2025-01-28 RX ORDER — LOSARTAN POTASSIUM 50 MG/1
50 TABLET ORAL DAILY
Status: DISCONTINUED | OUTPATIENT
Start: 2025-01-29 | End: 2025-01-31 | Stop reason: HOSPADM

## 2025-01-28 RX ORDER — ACETAMINOPHEN 325 MG/1
650 TABLET ORAL EVERY 4 HOURS PRN
Status: DISCONTINUED | OUTPATIENT
Start: 2025-01-28 | End: 2025-01-31 | Stop reason: HOSPADM

## 2025-01-28 RX ORDER — IPRATROPIUM BROMIDE AND ALBUTEROL SULFATE 2.5; .5 MG/3ML; MG/3ML
3 SOLUTION RESPIRATORY (INHALATION) EVERY 6 HOURS PRN
Status: DISCONTINUED | OUTPATIENT
Start: 2025-01-28 | End: 2025-01-31 | Stop reason: HOSPADM

## 2025-01-28 RX ORDER — OXYCODONE HYDROCHLORIDE 5 MG/1
5 TABLET ORAL EVERY 4 HOURS PRN
Status: DISCONTINUED | OUTPATIENT
Start: 2025-01-28 | End: 2025-01-31 | Stop reason: HOSPADM

## 2025-01-28 RX ORDER — IBUPROFEN 200 MG
24 TABLET ORAL
Status: DISCONTINUED | OUTPATIENT
Start: 2025-01-28 | End: 2025-01-31 | Stop reason: HOSPADM

## 2025-01-28 RX ORDER — TALC
6 POWDER (GRAM) TOPICAL NIGHTLY PRN
Status: DISCONTINUED | OUTPATIENT
Start: 2025-01-28 | End: 2025-01-31 | Stop reason: HOSPADM

## 2025-01-28 RX ORDER — CIPROFLOXACIN 500 MG/1
500 TABLET ORAL 2 TIMES DAILY
COMMUNITY
Start: 2024-09-13 | End: 2025-01-28

## 2025-01-28 RX ORDER — VELPATASVIR AND SOFOSBUVIR 100; 400 MG/1; MG/1
1 TABLET, FILM COATED ORAL
Status: ON HOLD | COMMUNITY
Start: 2025-01-16 | End: 2025-02-06 | Stop reason: HOSPADM

## 2025-01-28 RX ORDER — CLOPIDOGREL BISULFATE 75 MG/1
75 TABLET ORAL DAILY
Status: DISCONTINUED | OUTPATIENT
Start: 2025-01-28 | End: 2025-01-31 | Stop reason: HOSPADM

## 2025-01-28 RX ORDER — IBUPROFEN 200 MG
16 TABLET ORAL
Status: DISCONTINUED | OUTPATIENT
Start: 2025-01-28 | End: 2025-01-31 | Stop reason: HOSPADM

## 2025-01-28 RX ORDER — POTASSIUM CHLORIDE 20 MEQ/1
40 TABLET, EXTENDED RELEASE ORAL
Status: COMPLETED | OUTPATIENT
Start: 2025-01-28 | End: 2025-01-28

## 2025-01-28 RX ORDER — TRAMADOL HYDROCHLORIDE 50 MG/1
50 TABLET ORAL EVERY 8 HOURS PRN
Status: ON HOLD | COMMUNITY
Start: 2025-01-06 | End: 2025-02-06 | Stop reason: HOSPADM

## 2025-01-28 RX ORDER — OXYCODONE HYDROCHLORIDE 5 MG/1
5 TABLET ORAL
Status: COMPLETED | OUTPATIENT
Start: 2025-01-28 | End: 2025-01-28

## 2025-01-28 RX ORDER — ASPIRIN 81 MG/1
81 TABLET ORAL DAILY
Status: DISCONTINUED | OUTPATIENT
Start: 2025-01-28 | End: 2025-01-31 | Stop reason: HOSPADM

## 2025-01-28 RX ORDER — NALOXONE HCL 0.4 MG/ML
0.02 VIAL (ML) INJECTION
Status: DISCONTINUED | OUTPATIENT
Start: 2025-01-28 | End: 2025-01-31 | Stop reason: HOSPADM

## 2025-01-28 RX ORDER — POTASSIUM CHLORIDE 20 MEQ/1
40 TABLET, EXTENDED RELEASE ORAL ONCE
Status: COMPLETED | OUTPATIENT
Start: 2025-01-28 | End: 2025-01-28

## 2025-01-28 RX ORDER — ALPRAZOLAM 0.5 MG/1
0.5 TABLET ORAL EVERY 6 HOURS PRN
Status: ON HOLD | COMMUNITY
Start: 2024-11-05 | End: 2025-01-31

## 2025-01-28 RX ORDER — ALUMINUM HYDROXIDE, MAGNESIUM HYDROXIDE, AND SIMETHICONE 1200; 120; 1200 MG/30ML; MG/30ML; MG/30ML
30 SUSPENSION ORAL 4 TIMES DAILY PRN
Status: DISCONTINUED | OUTPATIENT
Start: 2025-01-28 | End: 2025-01-31 | Stop reason: HOSPADM

## 2025-01-28 RX ORDER — ACETAMINOPHEN 325 MG/1
650 TABLET ORAL EVERY 8 HOURS PRN
Status: DISCONTINUED | OUTPATIENT
Start: 2025-01-28 | End: 2025-01-31 | Stop reason: HOSPADM

## 2025-01-28 RX ORDER — FLUCONAZOLE 200 MG/1
400 TABLET ORAL ONCE
Status: COMPLETED | OUTPATIENT
Start: 2025-01-28 | End: 2025-01-28

## 2025-01-28 RX ADMIN — GABAPENTIN 400 MG: 400 CAPSULE ORAL at 03:01

## 2025-01-28 RX ADMIN — POTASSIUM CHLORIDE 40 MEQ: 1500 TABLET, EXTENDED RELEASE ORAL at 05:01

## 2025-01-28 RX ADMIN — CALCIUM CARBONATE (ANTACID) CHEW TAB 500 MG 1000 MG: 500 CHEW TAB at 05:01

## 2025-01-28 RX ADMIN — MORPHINE SULFATE 4 MG: 4 INJECTION INTRAVENOUS at 02:01

## 2025-01-28 RX ADMIN — POTASSIUM CHLORIDE 10 MEQ: 7.46 INJECTION, SOLUTION INTRAVENOUS at 08:01

## 2025-01-28 RX ADMIN — POTASSIUM CHLORIDE 10 MEQ: 7.46 INJECTION, SOLUTION INTRAVENOUS at 06:01

## 2025-01-28 RX ADMIN — POTASSIUM CHLORIDE 10 MEQ: 7.46 INJECTION, SOLUTION INTRAVENOUS at 05:01

## 2025-01-28 RX ADMIN — OXYCODONE 5 MG: 5 TABLET ORAL at 11:01

## 2025-01-28 RX ADMIN — PIPERACILLIN SODIUM AND TAZOBACTAM SODIUM 4.5 G: 4; .5 INJECTION, POWDER, LYOPHILIZED, FOR SOLUTION INTRAVENOUS at 09:01

## 2025-01-28 RX ADMIN — MAGNESIUM SULFATE HEPTAHYDRATE 2 G: 40 INJECTION, SOLUTION INTRAVENOUS at 05:01

## 2025-01-28 RX ADMIN — GABAPENTIN 400 MG: 400 CAPSULE ORAL at 08:01

## 2025-01-28 RX ADMIN — PIPERACILLIN SODIUM AND TAZOBACTAM SODIUM 4.5 G: 4; .5 INJECTION, POWDER, LYOPHILIZED, FOR SOLUTION INTRAVENOUS at 01:01

## 2025-01-28 RX ADMIN — POTASSIUM CHLORIDE 10 MEQ: 7.46 INJECTION, SOLUTION INTRAVENOUS at 09:01

## 2025-01-28 RX ADMIN — PIPERACILLIN SODIUM AND TAZOBACTAM SODIUM 4.5 G: 4; .5 INJECTION, POWDER, LYOPHILIZED, FOR SOLUTION INTRAVENOUS at 05:01

## 2025-01-28 RX ADMIN — MAGNESIUM SULFATE HEPTAHYDRATE 2 G: 40 INJECTION, SOLUTION INTRAVENOUS at 08:01

## 2025-01-28 RX ADMIN — VANCOMYCIN HYDROCHLORIDE 500 MG: 500 INJECTION, POWDER, LYOPHILIZED, FOR SOLUTION INTRAVENOUS at 01:01

## 2025-01-28 RX ADMIN — NICOTINE 1 PATCH: 14 PATCH, EXTENDED RELEASE TRANSDERMAL at 02:01

## 2025-01-28 RX ADMIN — FLUCONAZOLE 400 MG: 200 TABLET ORAL at 09:01

## 2025-01-28 RX ADMIN — CLOPIDOGREL BISULFATE 75 MG: 75 TABLET ORAL at 12:01

## 2025-01-28 RX ADMIN — ASPIRIN 81 MG: 81 TABLET, COATED ORAL at 02:01

## 2025-01-28 RX ADMIN — GABAPENTIN 400 MG: 400 CAPSULE ORAL at 12:01

## 2025-01-28 RX ADMIN — POTASSIUM CHLORIDE 40 MEQ: 1500 TABLET, EXTENDED RELEASE ORAL at 09:01

## 2025-01-28 RX ADMIN — METOPROLOL SUCCINATE 50 MG: 50 TABLET, EXTENDED RELEASE ORAL at 12:01

## 2025-01-28 RX ADMIN — POTASSIUM CHLORIDE 40 MEQ: 1500 TABLET, EXTENDED RELEASE ORAL at 06:01

## 2025-01-28 RX ADMIN — ATORVASTATIN CALCIUM 40 MG: 40 TABLET, FILM COATED ORAL at 12:01

## 2025-01-28 RX ADMIN — VANCOMYCIN HYDROCHLORIDE 750 MG: 750 INJECTION, POWDER, LYOPHILIZED, FOR SOLUTION INTRAVENOUS at 06:01

## 2025-01-28 RX ADMIN — OXYCODONE 5 MG: 5 TABLET ORAL at 06:01

## 2025-01-28 NOTE — ED TRIAGE NOTES
Daina Ngo, a 65 y.o. female presents to the ED w/ complaint of wound check. Pt has hx of bilateral BKA d/t PAD 7 years ago. For several months, pt has been developing wounds to her lower extremities and went to [facility] for wound check last week. Pt was discharged from [facility] so pt wanted to come here for second opinion. Pt has also been more lethargic than normal for the past day or two according to family. Pt denies CP, SOB, GI symptoms, fever/chills. Pt has wounds to the tip of the L BKA, R inner thigh, and R hip. Pt in 10/10 pain. Alfaro in place.    Triage note:  Chief Complaint   Patient presents with    Wound Check     Seen at [facility] last week, came to get a second opinion on her wounds. Pt c/o pain all over.      Review of patient's allergies indicates:  No Known Allergies  Past Medical History:   Diagnosis Date    PAD (peripheral artery disease)

## 2025-01-28 NOTE — ASSESSMENT & PLAN NOTE
Hepatitis C  - , ALT 59 on admission   - Recently started on Epclusa as outpatient for treatment of hepatitis C, which the patient has not started yet  - Continue to monitor LFTs on daily labs  - Avoid hepatotoxic agents as able

## 2025-01-28 NOTE — ED NOTES
Pt identifiers Daina Ngo were checked and are correct  LOC: The patient is awake, alert, aware of environment with an appropriate affect. Oriented x4, speaking appropriately  APPEARANCE: Pt resting comfortably, in no acute distress, pt is clean and well groomed, clothing properly fastened  Suprpubic  catheter noted with yellow colored urine draining   SKIN: Skin warm, dry and intact, normal skin turgor, moist mucus membranes Black colored scab noted to left BKA stump, Purple colored  bruising noted to left buttock, small dry wound with scab noted to left buttock , small open wounds with pink center noted to coccyx and mepilex dressing applied , Large open wound noted to right inner thigh and dressed with telfa, 4X4 gauze and paper tape, open wound with pink center noted to right buttock and mepilex dressing applied   RESPIRATORY: Airway is open and patent, respirations are spontaneous, even and unlabored, normal effort and rate  CARDIAC: Normal rate and rhythm, no peripheral edema noted, capillary refill < 3 seconds, bilateral radial pulses 2+ Pt is on a cardiac monitor and pulse oximetry   ABDOMEN: Soft, nontender, nondistended. Bowel sounds present   NEUROLOGIC: PERRL, facial expression is symmetrical, patient moving all extremities spontaneously, normal sensation in all extremities when touched with a finger.  Follows all commands appropriately  MUSCULOSKELETAL:Dung BKA noted

## 2025-01-28 NOTE — CONSULTS
[facility] - Emergency Dept  Infectious Disease  Consult Note    Patient Name: Daina Ngo  MRN: [id number]  Admission Date: 1/27/2025  Hospital Length of Stay: 0 days  Attending Physician: Dov sEpaña MD  Primary Care Provider: No, Primary Doctor     Isolation Status: No active isolations    Patient information was obtained from relative(s), ER records, and EMR .      Inpatient consult to Infectious Diseases  Consult performed by: Yumiko Carvajal FNP  Consult ordered by: Ninoska Odonnell PA-C        Assessment/Plan:     Orthopedic  Pressure injury of skin of sacral region  I have reviewed hospital notes from HM service and other specialty providers. I have also reviewed CBC, CMP/BMP, cultures and imaging with my interpretation as documented.      65 y.o. F with severe PAD s/p bilateral AKAs, HTN, HLD, aortic occlusion, hepatitis C, nicotine dependence, and depression/anxiety who presented to the ED for evaluation of multiple wounds. Per chart review, pt was recently admitted for 8-day stay at [facility] for infected decubitus ulcers, where she was treated with broad spectrum antibiotics and transitioned to augmentin and doxycycline at discharge (discharged 1/20). During that admission, she was also found to have an almost totally occluded descending abdominal aorta for which no interventions were performed & also had recurrent urinary retention for which urology placed a suprapubic catheter. She was discharged home with home health but now presents to ED for evaluation given progressively worsening wound pain (predominately to the R stump and buttocks) and debility with new inability to safely care for herself at home. She reports compliance with augmentin and doxycycline regimen at home but believes her wounds are not improving and have become more painful since her discharge. No intervention planned per vascular.     Recommendations / Plan  D/c Zosyn  Continue vancomycin (pharmacy to dose) and start  Ceftriaxone 2 g q24 hrs IV until blood cultures result negative x 48 hrs.   If negative, can transition to oral Doxycycline 100 mg PO BID and Augmentin 875/125 mg PO BID x 14 days  Recommend wound care      -- Discussed with ID staff and primary team  -- ID will sign off, please re-consult with any questions          Thank you for your consult. I will sign off. Please contact us if you have any additional questions.    Yumiko Carvajal, FNP  Infectious Disease  [facility] - Emergency Dept    Subjective:     Principal Problem: Open wound of right lower extremity with complication    HPI: 65 y.o. F with severe PAD s/p bilateral AKAs, HTN, HLD, aortic occlusion, hepatitis C, nicotine dependence, and depression/anxiety who presented to the ED for evaluation of multiple wounds. Per chart review, pt was recently admitted for [place]-day stay [place] for infected decubitus ulcers, where she was treated with broad spectrum antibiotics and transitioned to augmentin and doxycycline at discharge (discharged 1/20). During that admission, she was also found to have an almost totally occluded descending abdominal aorta for which no interventions were performed & also had recurrent urinary retention for which urology placed a suprapubic catheter. She was discharged home with home health but now presents to ED for evaluation given progressively worsening wound pain (predominately to the R stump and buttocks) and debility with new inability to safely care for herself at home. She reports compliance with augmentin and doxycycline regimen at home but believes her wounds are not improving and have become more painful since her discharge. Pt denies fever, chills, chest pain, palpitations, SOB, cough, abdominal pain, N/V/D, HA, or syncope.     Past Medical History:   Diagnosis Date    PAD (peripheral artery disease)        Past Surgical History:   Procedure Laterality Date    LEG AMPUTATION         Review of patient's allergies indicates:  No  "Known Allergies    Medications:  (Not in a hospital admission)    Antibiotics (From admission, onward)      Start     Stop Route Frequency Ordered    01/28/25 1200  piperacillin-tazobactam (ZOSYN) 4.5 g in D5W 100 mL IVPB (MB+)         -- IV Every 8 hours (non-standard times) 01/28/25 0717    01/28/25 1130  vancomycin (VANCOCIN) 500 mg in D5W 100 mL IVPB (MB+)         -- IV Every 12 hours (non-standard times) 01/28/25 1119    01/28/25 0816  vancomycin - pharmacy to dose  (vancomycin IVPB (PEDS and ADULTS))        Placed in "And" Linked Group    -- IV pharmacy to manage frequency 01/28/25 0717          Antifungals (From admission, onward)      None          Antivirals (From admission, onward)      None               There is no immunization history on file for this patient.    Family History    None       Social History     Socioeconomic History    Marital status: Single   Tobacco Use    Smoking status: Never    Smokeless tobacco: Never   Substance and Sexual Activity    Alcohol use: Yes    Drug use: Not Currently     Social Drivers of Health     Financial Resource Strain: Low Risk  (7/31/2024)    Received from [facility]    Overall Financial Resource Strain (CARDIA)     Difficulty of Paying Living Expenses: Not hard at all   Food Insecurity: No Food Insecurity (1/2/2025)    Received from [facility]    Hunger Vital Sign     Worried About Running Out of Food in the Last Year: Never true     Ran Out of Food in the Last Year: Never true   Transportation Needs: No Transportation Needs (1/2/2025)    Received from [facility]    PRAPARE - Transportation     Lack of Transportation (Medical): No     Lack of Transportation (Non-Medical): No   Physical Activity: Insufficiently Active (7/31/2024)    Received from [facility]    Exercise Vital Sign     Days of Exercise per Week: 2 days     Minutes of Exercise per Session: 20 min   Stress: No Stress Concern Present (7/31/2024)    Received from [facility]    [place] " of Occupational [employer] - Occupational Stress Questionnaire     Feeling of Stress : Not at all   Housing Stability: Low Risk  (1/2/2025)    Received from [facility]    Housing Stability Vital Sign     Unable to Pay for Housing in the Last Year: No     Number of Times Moved in the Last Year: 1     Homeless in the Last Year: No     Review of Systems   Constitutional:  Negative for appetite change, chills, fatigue and fever.   Respiratory:  Negative for chest tightness, shortness of breath and wheezing.    Cardiovascular:  Negative for chest pain and palpitations.   Gastrointestinal:  Negative for abdominal distention, abdominal pain, constipation, diarrhea, nausea and vomiting.   Genitourinary:  Negative for decreased urine volume, difficulty urinating, dysuria, flank pain, frequency and urgency.   Musculoskeletal:         Hip pain   Skin:  Positive for wound. Negative for rash.     Objective:     Vital Signs (Most Recent):  Temp: 97.9 °F (36.6 °C) (01/28/25 0642)  Pulse: 95 (01/28/25 1330)  Resp: 18 (01/28/25 1330)  BP: 136/61 (01/28/25 1330)  SpO2: 97 % (01/28/25 1330) Vital Signs (24h Range):  Temp:  [97.9 °F (36.6 °C)-99.5 °F (37.5 °C)] 97.9 °F (36.6 °C)  Pulse:  [] 95  Resp:  [10-24] 18  SpO2:  [95 %-100 %] 97 %  BP: ()/(56-83) 136/61     Weight: 41.3 kg (91 lb)  Body mass index is 17.77 kg/m².    Estimated Creatinine Clearance: 60.9 mL/min (based on SCr of 0.6 mg/dL).     Physical Exam  Vitals and nursing note reviewed.   Constitutional:       General: She is not in acute distress.     Appearance: She is not ill-appearing.   Cardiovascular:      Rate and Rhythm: Normal rate and regular rhythm.      Pulses: Normal pulses.      Heart sounds: No murmur heard.  Pulmonary:      Effort: No respiratory distress.   Abdominal:      General: There is no distension.      Tenderness: There is no abdominal tenderness.   Musculoskeletal:         General: No swelling or tenderness.   Skin:     General: Skin is warm  "and dry.   Psychiatric:         Behavior: Behavior normal.          Significant Labs: Blood Culture:   Recent Labs   Lab 01/27/25 2158   LABBLOO No Growth to date  No Growth to date     CBC:   Recent Labs   Lab 01/27/25 2139 01/28/25  1324   WBC 14.08* 12.46   HGB 13.0 11.3*   HCT 38.6 33.6*    189     CMP:   Recent Labs   Lab 01/27/25 2139   *   K 2.8*   CL 94*   CO2 27   *   BUN 23   CREATININE 0.6   CALCIUM 8.5*   PROT 8.1   ALBUMIN 2.5*   BILITOT 1.2*   ALKPHOS 88   *   ALT 59*   ANIONGAP 14     Wound Culture: No results for input(s): "LABAERO" in the last 4320 hours.  All pertinent labs within the past 24 hours have been reviewed.    Significant Imaging: I have reviewed all pertinent imaging results/findings within the past 24 hours.              "

## 2025-01-28 NOTE — HPI
Daina Ngo is a 64 yo female with a pmhx of aortic occlusive disease, PAD s/p bilateral AKA in 2018 - follows with vascular surgery at [facility], decubitus ulcer who presented to the ED with chronic wounds and pain. Of note, patient was recently discharged from [facility] with antibiotics and home health for treatment of infected decubitus ulcer. Patient has had chronic stump wound bilaterally for greater than 1 month. She takes plavix at home no anticoagulation. Vascular surgery was consulted for AKA stump wounds and chronic occlusive disease.    In the ED, patient is hemodynamically normal. She does leukocytosis of 14 with increased LFTs and bilirubin.  Patient is accompanied by her sister at bedside and daughter over the phone. They all state she had these LE and sacral wounds quite a while. Most recently, the left medial thigh wound was debrided by her vascular surgeon. She has never had the stump wound debrided. They state that her stump actually might have improved. Daughter states that the patient fell around thanksgiving and has been complaining of mostly pelvic pain. She is a former smoker 1 pack/day for 46 years (46 ttl pk-yrs).   CTA on 1/28 demonstrates known complete occlusion of the infrarenal abdominal aorta and iliac arteries, occluded common femoral and superficial femoral arteries bilaterally, with trace collateral opacification of the bilateral deep femoral arteries to each distal AKA stump.

## 2025-01-28 NOTE — CONSULTS
[facility] Emergency Dept  Vascular Surgery  Consult Note    Inpatient consult to Vascular Surgery  Consult performed by: Sarwat Frederick MD  Consult ordered by: Arlen Shrestha PA-C        Subjective:     Chief Complaint/Reason for Admission: Thigh wounds     History of Present Illness: Daina Ngo is a 64 yo female with a pmhx of aortic occlusive disease, PAD s/p bilateral AKA in 2018 - follows with vascular surgery at [facility], decubitus ulcer who presented to the ED with chronic wounds and pain. Of note, patient was recently discharged from [facility] with antibiotics and home health for treatment of infected decubitus ulcer. Patient has had chronic stump wound bilaterally for greater than 1 month. She takes plavix at home no anticoagulation. Vascular surgery was consulted for AKA stump wounds and chronic occlusive disease.    In the ED, patient is hemodynamically normal. She does leukocytosis of 14 with increased LFTs and bilirubin.  Patient is accompanied by her sister at bedside and daughter over the phone. They all state she had these LE and sacral wounds quite a while. Most recently, the left medial thigh wound was debrided by her vascular surgeon. She has never had the stump wound debrided. They state that her stump actually might have improved. Daughter states that the patient fell around thanksgiving and has been complaining of mostly pelvic pain. She is a former smoker 1 pack/day for 46 years (46 ttl pk-yrs).   CTA on 1/28 demonstrates known complete occlusion of the infrarenal abdominal aorta and iliac arteries, occluded common femoral and superficial femoral arteries bilaterally, with trace collateral opacification of the bilateral deep femoral arteries to each distal AKA stump.    (Not in a hospital admission)      Review of patient's allergies indicates:  No Known Allergies    Past Medical History:   Diagnosis Date    PAD (peripheral artery disease)      Past Surgical History:   Procedure Laterality Date     LEG AMPUTATION       Family History    None       Tobacco Use    Smoking status: Never    Smokeless tobacco: Never   Substance and Sexual Activity    Alcohol use: Yes    Drug use: Not Currently    Sexual activity: Not on file     Review of Systems   Constitutional: Negative.    Respiratory: Negative.     Cardiovascular: Negative.    Gastrointestinal: Negative.    Genitourinary: Negative.    Musculoskeletal:  Positive for back pain and myalgias.   Skin:  Positive for wound.   Neurological: Negative.    Hematological: Negative.      Objective:     Vital Signs (Most Recent):  Temp: 97.9 °F (36.6 °C) (01/28/25 0642)  Pulse: 95 (01/28/25 1115)  Resp: 18 (01/28/25 1131)  BP: 94/65 (01/28/25 0642)  SpO2: 97 % (01/28/25 0642) Vital Signs (24h Range):  Temp:  [97.9 °F (36.6 °C)-99.5 °F (37.5 °C)] 97.9 °F (36.6 °C)  Pulse:  [] 95  Resp:  [10-24] 18  SpO2:  [95 %-100 %] 97 %  BP: ()/(56-77) 94/65     Weight: 41.3 kg (91 lb)  Body mass index is 17.77 kg/m².      Physical Exam  Constitutional:       Appearance: She is not ill-appearing.   HENT:      Head: Normocephalic.   Eyes:      Pupils: Pupils are equal, round, and reactive to light.   Cardiovascular:      Pulses:           Femoral pulses are 0 on the right side and 0 on the left side.  Skin:     Comments: R open medial thigh wound   L AKA stump dry eschar present           Significant Labs:  All pertinent labs from the last 24 hours have been reviewed.    Significant Diagnostics:  I have reviewed all pertinent imaging results/findings within the past 24 hours.  Assessment/Plan:     Aortic occlusion  Daina Ngo is a 66 yo female with a pmhx of aortic occlusive disease, PAD s/p bilateral AKA in 2018 presenting with chronic wounds and pain s/p recent R thigh debridement.     - Unfortunately, no revascularization option available for this patient.   - Recommend continued local wound care.   - Consider palliative consult.     Sarwat Frederick MD  Vascular  Surgery  [facility] - Emergency Dept

## 2025-01-28 NOTE — HPI
Daina Ngo is a 65 y.o. F with severe PAD s/p bilateral AKAs, HTN, HLD, aortic occlusion, hepatitis C, nicotine dependence, and depression/anxiety who presented to the ED for evaluation of multiple wounds. Per chart review, pt was recently admitted for 8-day stay at [facility] for infected decubitus ulcers, where she was treated with broad spectrum antibiotics and transitioned to augmentin and doxycycline at discharge (discharged 1/20). During that admission, she was also found to have an almost totally occluded descending abdominal aorta for which no interventions were performed & also had recurrent urinary retention for which urology placed a suprapubic catheter. She was discharged home with home health but now presents to ED for evaluation given progressively worsening wound pain (predominately to the R stump and buttocks) and debility with new inability to safely care for herself at home. She reports compliance with augmentin and doxycycline regimen at home but believes her wounds are not improving and have become more painful since her discharge. Pt denies fever, chills, chest pain, palpitations, SOB, cough, abdominal pain, N/V/D, HA, or syncope.    In the ED: Mildly tachycardic to 100s and hypotensive to 80s/60s which improved with IVFs o/w VSSAF.  CBC with leukocytosis to 14. CMP with K 2.8, , ALT 59. UA with 38 WBCs 2+ leuk esterase, and occasional yeast. CTA run-off with complete occlusion of the infrarenal abdominal aorta and iliac arteries, compatible with aortoiliac occlusive disease (Leriche syndrome); Occluded common femoral and superficial femoral arteries bilaterally, with trace collateral opacification of the bilateral deep femoral arteries to each distal AKA stump; patent but critically stenosed proximal SMA; and multiple acute vs subacute pelvic/acetabular fractures. Pt was given vancomycin, zosyn, potassium, morphine, dilaudid, and IVFs and was admitted to hospital medicine for  further management.

## 2025-01-28 NOTE — PHARMACY MED REC
"Admission Medication History     The home medication history was taken by Junior Manzano.    You may go to "Admission" then "Reconcile Home Medications" tabs to review and/or act upon these items.     The home medication list has been updated by the Pharmacy department.   Please read ALL comments highlighted in yellow.   Please address this information as you see fit.    Feel free to contact us if you have any questions or require assistance.      The medications listed below were removed from the home medication list. Please reorder if appropriate:  Patient reports no longer taking the following medication(s):  ATORVASTATIN 40 MG  CIPROFLOXACIN 500 MG    Medications listed below were obtained from: Patient/family and Analytic software- [employer]  Current Outpatient Medications on File Prior to Encounter   Medication Sig    ALPRAZolam (XANAX) 0.5 MG tablet   Take 0.5 mg by mouth every 6 (six) hours as needed for Anxiety.    amoxicillin-clavulanate 875-125mg (AUGMENTIN)   875-125 mg per tablet   Take 1 tablet by mouth 2 (two) times daily.    chlorthalidone (HYGROTEN) 25 MG Tab   Take 1 tablet by mouth once daily.    docosahexaenoic acid/epa (FISH OIL ORAL)   Take 1 capsule by mouth once daily.    doxycycline (VIBRAMYCIN) 100 MG Cap   Take 100 mg by mouth every 12 (twelve) hours.    EPCLUSA 400-100 mg Tab   Take 1 tablet by mouth.    losartan (COZAAR) 50 MG tablet   Take 1 tablet by mouth once daily.    multivit-min/folic acid/lutein (CENTRUM SILVER ORAL)   Take 1 tablet by mouth once daily.    traMADoL (ULTRAM) 50 mg tablet   Take 50 mg by mouth every 8 (eight) hours as needed for Pain.    gabapentin (NEURONTIN) 400 MG capsule   Take 1 capsule (400 mg total) by mouth 3 (three) times daily    metoprolol succinate (TOPROL-XL) 50 MG 24 hr tablet Take 50 mg by mouth once daily.        Potential issues to be addressed PRIOR TO DISCHARGE  Patient reported not taking the following medications: (PLAVIX). These medications remain " on the home medication list. Please address accordingly.           Junior Manzano  EXT [id number]                    .

## 2025-01-28 NOTE — HPI
65 y.o. F with severe PAD s/p bilateral AKAs, HTN, HLD, aortic occlusion, hepatitis C, nicotine dependence, and depression/anxiety who presented to the ED for evaluation of multiple wounds. Per chart review, pt was recently admitted for 8-day stay at [facility] for infected decubitus ulcers, where she was treated with broad spectrum antibiotics and transitioned to augmentin and doxycycline at discharge (discharged 1/20). During that admission, she was also found to have an almost totally occluded descending abdominal aorta for which no interventions were performed & also had recurrent urinary retention for which urology placed a suprapubic catheter. She was discharged home with home health but now presents to ED for evaluation given progressively worsening wound pain (predominately to the R stump and buttocks) and debility with new inability to safely care for herself at home. She reports compliance with augmentin and doxycycline regimen at home but believes her wounds are not improving and have become more painful since her discharge. Pt denies fever, chills, chest pain, palpitations, SOB, cough, abdominal pain, N/V/D, HA, or syncope.

## 2025-01-28 NOTE — PROGRESS NOTES
"Pharmacokinetic Initial Assessment: IV Vancomycin    Assessment/Plan:    Initiate intravenous vancomycin with loading dose of 750 mg once followed by a maintenance dose of vancomycin 500 mg IV every 12 hours. Desired empiric serum trough concentration is 10 to 20 mcg/mL. Draw vancomycin trough level 60 min prior to fourth dose on 1/29 at approximately 1000. Pharmacy will continue to follow and monitor vancomycin.      Please contact pharmacy at extension [phone] with any questions regarding this assessment.     Thank you for the consult,   Ankita Zamudio, PharmD       Patient brief summary:  Daina Ngo is a 65 y.o. female initiated on antimicrobial therapy with IV Vancomycin for treatment of suspected skin & soft tissue infection    Drug Allergies:   Review of patient's allergies indicates:  No Known Allergies    Actual Body Weight:   41.3 kg    Renal Function:   Estimated Creatinine Clearance: 60.9 mL/min (based on SCr of 0.6 mg/dL).,     Dialysis Method (if applicable):  N/A    CBC (last 72 hours):  Recent Labs   Lab Result Units 01/27/25  2139   WBC K/uL 14.08*   Hemoglobin g/dL 13.0   Hematocrit % 38.6   Platelets K/uL 217   Gran % % 74.4*   Lymph % % 19.2   Mono % % 6.0   Eosinophil % % 0.0   Basophil % % 0.1   Differential Method  Automated       Metabolic Panel (last 72 hours):  Recent Labs   Lab Result Units 01/27/25 2139 01/27/25  2228   Sodium mmol/L 135*  --    Potassium mmol/L 2.8*  --    Chloride mmol/L 94*  --    CO2 mmol/L 27  --    Glucose mg/dL 118*  --    Glucose, UA   --  Negative   BUN mg/dL 23  --    Creatinine mg/dL 0.6  --    Albumin g/dL 2.5*  --    Total Bilirubin mg/dL 1.2*  --    Alkaline Phosphatase U/L 88  --    AST U/L 197*  --    ALT U/L 59*  --        Drug levels (last 3 results):  No results for input(s): "VANCOMYCINRA", "VANCORANDOM", "VANCOMYCINPE", "VANCOPEAK", "VANCOMYCINTR", "VANCOTROUGH" in the last 72 hours.    Microbiologic Results:  Microbiology Results (last 7 days)  "      Procedure Component Value Units Date/Time    Blood culture #2 **CANNOT BE ORDERED STAT** [[id number]] Collected: 01/27/25 2158    Order Status: Completed Specimen: Blood from Peripheral, Antecubital, Right Updated: 01/28/25 0715     Blood Culture, Routine No Growth to date    Blood culture #1 **CANNOT BE ORDERED STAT** [[id number]] Collected: 01/27/25 2158    Order Status: Completed Specimen: Blood from Peripheral, Forearm, Left Updated: 01/28/25 0715     Blood Culture, Routine No Growth to date    Urine culture [[id number]] Collected: 01/27/25 2228    Order Status: No result Specimen: Urine Updated: 01/27/25 4131

## 2025-01-28 NOTE — ASSESSMENT & PLAN NOTE
I have reviewed hospital notes from  service and other specialty providers. I have also reviewed CBC, CMP/BMP, cultures and imaging with my interpretation as documented.      65 y.o. F with severe PAD s/p bilateral AKAs, HTN, HLD, aortic occlusion, hepatitis C, nicotine dependence, and depression/anxiety who presented to the ED for evaluation of multiple wounds. Per chart review, pt was recently admitted for 8-day stay at [facility] for infected decubitus ulcers, where she was treated with broad spectrum antibiotics and transitioned to augmentin and doxycycline at discharge (discharged 1/20). During that admission, she was also found to have an almost totally occluded descending abdominal aorta for which no interventions were performed & also had recurrent urinary retention for which urology placed a suprapubic catheter. She was discharged home with home health but now presents to ED for evaluation given progressively worsening wound pain (predominately to the R stump and buttocks) and debility with new inability to safely care for herself at home. She reports compliance with augmentin and doxycycline regimen at home but believes her wounds are not improving and have become more painful since her discharge. No intervention planned per vascular.     Recommendations / Plan  D/c Zosyn  Continue vancomycin (pharmacy to dose) and start Ceftriaxone 2 g q24 hrs IV until blood cultures result negative x 48 hrs.   If negative, can transition to oral Doxycycline 100 mg PO BID and Augmentin 875/125 mg PO BID x 14 days  Recommend wound care      -- Discussed with ID staff and primary team  -- ID will sign off, please re-consult with any questions

## 2025-01-28 NOTE — ASSESSMENT & PLAN NOTE
Pressure injury of the sacral region  Pressure ulcer involving back and right hip   Peripheral vascular disease s/p bilateral AKA  - 65 y.o. F with severe vascular disease presenting to the ED for worsening of vascular and decubitus wounds despite compliance with outpatient antibiotics and wound care as described above  - 2/4 SIRS on admission for tachycardia > 90 and leukocytosis to 14   - CTA run-off c/w severe vascular disease and multiple occlusions as described above  - Poor wound healing likely 2/2 severe vascular disease and malnutrition vs. ongoing infection   - Vascular surgery and wound care consulted   - Given vancomycin & zosyn in the ED, continue for now pending ID recommendations
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
## 2025-01-28 NOTE — ASSESSMENT & PLAN NOTE
Patient with acute on chronic debility due to bed confinement status and worsening pain to multiple decubitus & vascular wounds. The patient's latest AMPAC (Activity Measure for Post Acute Care) Score is listed below.    AM-PAC Score - How much help does the patient need for each activity listed       Plan  - Progressive mobility protocol initated  - PT/OT consulted  - Fall precautions in place

## 2025-01-28 NOTE — ASSESSMENT & PLAN NOTE
- Body mass index is 17.77 kg/m².   - Encourage maximal PO intake and monitor closely for weight changes.  - Nutrition consulted

## 2025-01-28 NOTE — ASSESSMENT & PLAN NOTE
Patient's blood pressure range in the last 24 hours was: BP  Min: 89/62  Max: 151/83.The patient's inpatient anti-hypertensive regimen is listed below:  Current Antihypertensives  chlorthalidone tablet 25 mg, Daily, Oral  losartan tablet 50 mg, Daily, Oral  metoprolol succinate (TOPROL-XL) 24 hr tablet 50 mg, Daily, Oral    Plan  - BP is controlled, no changes needed to their regimen  - Hold home chlorthalidone for now given low normal BP - restart when medically indicated

## 2025-01-28 NOTE — ASSESSMENT & PLAN NOTE
- Chronic issue  - Continue home gabapentin TID - increase as indicated to optimize pain control   - Would benefit from pain management follow-up at discharge

## 2025-01-28 NOTE — SUBJECTIVE & OBJECTIVE
(Not in a hospital admission)      Review of patient's allergies indicates:  No Known Allergies    Past Medical History:   Diagnosis Date    PAD (peripheral artery disease)      Past Surgical History:   Procedure Laterality Date    LEG AMPUTATION       Family History    None       Tobacco Use    Smoking status: Never    Smokeless tobacco: Never   Substance and Sexual Activity    Alcohol use: Yes    Drug use: Not Currently    Sexual activity: Not on file     Review of Systems   Constitutional: Negative.    Respiratory: Negative.     Cardiovascular: Negative.    Gastrointestinal: Negative.    Genitourinary: Negative.    Musculoskeletal:  Positive for back pain and myalgias.   Skin:  Positive for wound.   Neurological: Negative.    Hematological: Negative.      Objective:     Vital Signs (Most Recent):  Temp: 97.9 °F (36.6 °C) (01/28/25 0642)  Pulse: 95 (01/28/25 1115)  Resp: 18 (01/28/25 1131)  BP: 94/65 (01/28/25 0642)  SpO2: 97 % (01/28/25 0642) Vital Signs (24h Range):  Temp:  [97.9 °F (36.6 °C)-99.5 °F (37.5 °C)] 97.9 °F (36.6 °C)  Pulse:  [] 95  Resp:  [10-24] 18  SpO2:  [95 %-100 %] 97 %  BP: ()/(56-77) 94/65     Weight: 41.3 kg (91 lb)  Body mass index is 17.77 kg/m².      Physical Exam  Constitutional:       Appearance: She is not ill-appearing.   HENT:      Head: Normocephalic.   Eyes:      Pupils: Pupils are equal, round, and reactive to light.   Cardiovascular:      Pulses:           Femoral pulses are 0 on the right side and 0 on the left side.  Skin:     Comments: R open medial thigh wound   L AKA stump dry eschar present           Significant Labs:  All pertinent labs from the last 24 hours have been reviewed.    Significant Diagnostics:  I have reviewed all pertinent imaging results/findings within the past 24 hours.

## 2025-01-28 NOTE — SUBJECTIVE & OBJECTIVE
Hpi Title: Evaluation of Skin Lesions Past Medical History:   Diagnosis Date    PAD (peripheral artery disease)        Past Surgical History:   Procedure Laterality Date    LEG AMPUTATION         Review of patient's allergies indicates:  No Known Allergies    No current facility-administered medications on file prior to encounter.     Current Outpatient Medications on File Prior to Encounter   Medication Sig    ALPRAZolam (XANAX) 0.5 MG tablet Take 1 tablet by mouth every 6 (six) hours as needed.    amoxicillin-clavulanate 875-125mg (AUGMENTIN) 875-125 mg per tablet Take 1 tablet by mouth 2 (two) times daily.    chlorthalidone (HYGROTEN) 25 MG Tab Take 1 tablet by mouth once daily.    ciprofloxacin HCl (CIPRO) 500 MG tablet Take 500 mg by mouth 2 (two) times daily.    doxycycline (VIBRAMYCIN) 100 MG Cap Take 100 mg by mouth every 12 (twelve) hours.    EPCLUSA 400-100 mg Tab Take 1 tablet by mouth.    losartan (COZAAR) 50 MG tablet Take 1 tablet by mouth once daily.    traMADoL (ULTRAM) 50 mg tablet Take 50 mg by mouth every 8 (eight) hours as needed.    atorvastatin (LIPITOR) 40 MG tablet Take 40 mg by mouth.    clopidogreL (PLAVIX) 75 mg tablet Take 75 mg by mouth.    gabapentin (NEURONTIN) 400 MG capsule Take 1 capsule (400 mg total) by mouth 3 (three) times daily    metoprolol succinate (TOPROL-XL) 50 MG 24 hr tablet Take 50 mg by mouth.     Family History    None       Tobacco Use    Smoking status: Never    Smokeless tobacco: Never   Substance and Sexual Activity    Alcohol use: Yes    Drug use: Not Currently    Sexual activity: Not on file     Review of Systems   Constitutional:  Positive for activity change. Negative for chills and fever.   HENT:  Negative for trouble swallowing.    Eyes:  Negative for photophobia and visual disturbance.   Respiratory:  Negative for cough, chest tightness and shortness of breath.    Cardiovascular:  Negative for chest pain, palpitations and leg swelling.   Gastrointestinal:  Positive for constipation (2/2 opioid  How Severe Are Your Spot(S)?: mild use). Negative for abdominal pain, diarrhea, nausea and vomiting.   Genitourinary:  Positive for difficulty urinating and hematuria. Negative for pelvic pain.   Musculoskeletal:  Positive for arthralgias and gait problem. Negative for back pain and neck pain.   Skin:  Positive for color change and wound.   Neurological:  Negative for dizziness, syncope, speech difficulty and light-headedness.   Psychiatric/Behavioral:  Positive for confusion (intermittent since recent hospitalization per family). Negative for agitation. The patient is not nervous/anxious.      Objective:     Vital Signs (Most Recent):  Temp: 97.9 °F (36.6 °C) (01/28/25 0642)  Pulse: 95 (01/28/25 1115)  Resp: 18 (01/28/25 1131)  BP: 94/65 (01/28/25 0642)  SpO2: 97 % (01/28/25 0642) Vital Signs (24h Range):  Temp:  [97.9 °F (36.6 °C)-99.5 °F (37.5 °C)] 97.9 °F (36.6 °C)  Pulse:  [] 95  Resp:  [10-24] 18  SpO2:  [95 %-100 %] 97 %  BP: ()/(56-77) 94/65     Weight: 41.3 kg (91 lb)  Body mass index is 17.77 kg/m².     Physical Exam  Vitals and nursing note reviewed.   Constitutional:       General: She is not in acute distress.     Appearance: She is underweight. She is ill-appearing (2/2 pain). She is not toxic-appearing.   HENT:      Head: Normocephalic and atraumatic.      Mouth/Throat:      Mouth: Mucous membranes are dry.   Eyes:      Extraocular Movements: Extraocular movements intact.      Conjunctiva/sclera: Conjunctivae normal.   Cardiovascular:      Rate and Rhythm: Normal rate and regular rhythm.      Heart sounds: Normal heart sounds.   Pulmonary:      Effort: Pulmonary effort is normal. No respiratory distress.      Breath sounds: Normal breath sounds.   Abdominal:      General: Bowel sounds are normal. There is no distension.      Palpations: Abdomen is soft.      Tenderness: There is no abdominal tenderness.      Comments: Suprapubic catheter in place draining shae colored urine   Musculoskeletal:         General: Tenderness  Have Your Spot(S) Been Treated In The Past?: has not been treated and deformity present. Normal range of motion.      Cervical back: Normal range of motion and neck supple.      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      Left Lower Extremity: Left leg is amputated above knee.   Skin:     General: Skin is warm and dry.      Capillary Refill: Capillary refill takes less than 2 seconds.      Findings: Erythema and wound present.   Neurological:      General: No focal deficit present.      Mental Status: She is oriented to person, place, and time.      Motor: Weakness (generalized) present.   Psychiatric:         Behavior: Behavior normal.         Thought Content: Thought content normal.         Judgment: Judgment normal.     Right hip:      Right thigh:       Left AKA stump:       Sacrum:              Significant Labs: All pertinent labs within the past 24 hours have been reviewed.    Significant Imaging: I have reviewed all pertinent imaging results/findings within the past 24 hours.  CTA Runoff ABD PEL Bilat Lower Ext  Narrative: EXAMINATION:  CTA RUNOFF ABD PEL BILAT LOWER EXT    CLINICAL HISTORY:  Arterial embolism, lower extremity;known aortic occlusion;    TECHNIQUE:  CTA of abdomen, pelvis, and bilateral lower extremities performed with 100 mL Omnipaque 350 intravenous contrast.    COMPARISON:  Outside CTA runoff report dated 01/15/2025    FINDINGS:  Vasculature:    Intra-abdominal aorta: No aneurysm.  No dissection, penetrating atherosclerotic ulcer or intramural hematoma.    Extensive heterogeneous atherosclerotic plaque of the intra-abdominal aorta with dense noncalcified atheromatous plaque of the infrarenal abdominal aorta.    The celiac artery, SMA, and bilateral renal arteries remain patent with moderate noncalcific atheromatous plaque at their origins.    Note however that there is a critical stenosis of the proximal SMA.    KWAKU possibly occluded at its origin but promptly reconstitutes via collateral flow.    There is complete occlusion of the  infrarenal abdominal aorta.    The bilateral common iliac arteries and external and internal iliac arteries are occluded.    Postsurgical changes of the left, and possibly also the right, groin.    A synthetic arterial bypass graft extending from the left external iliac region to the left deep femoral artery distribution is occluded.    The common femoral, superficial femoral, and deep femoral arteries are occluded bilaterally.    There are numerous intramuscular collateral arteries in the abdominopelvic body wall and thighs.    VISUALIZED LOWER CHEST:    Lungs/Pleura: Respiratory motion artifact.  No focal consolidation or pleural effusion in the visualized lungs.    Heart: Normal size.  No pericardial effusion.    Thoracic soft tissues: No significant abnormality.    ABDOMEN/PELVIS:    Liver: Normal size with smooth contours.  Heterogeneous hepatic attenuation most likely relating to transient hepatic attenuation differences on these arterial phase images.  Allowing for this., no discrete hepatic mass is demonstrated.    Gallbladder: Partially contracted without calcified gallstones.  No pericholecystic inflammatory changes.    Bile ducts: No intrahepatic or extrahepatic biliary ductal dilatation.    Spleen: Unremarkable.    Pancreas: No mass.  No ductal dilatation. No peripancreatic fat stranding.    Adrenals: Nonspecific thickening of the left adrenal gland    Renal/Ureters: The kidneys are normal in size with bilateral symmetric enhancement.  Subcentimeter subcortical hypodensities in the upper pole and midpole of the left kidney (axial series 3, images 149, 199), too small to characterize..  No obstructing nephrolithiasis or hydronephrosis.  No evidence of obstructive uropathy.  The urinary bladder is completely decompressed about a suprapubic catheter.    Reproductive: Uterus is without significant abnormality. No adnexal mass.  Prominent engorged bilateral adnexal/parametrial vessels; in the appropriate  clinical context, this imaging finding may be associated with signs or symptoms of pelvic congestion syndrome.    Stomach/Bowel: Stomach is normal.  Small and large bowel are normal caliber without obstruction or evidence of inflammation.  Appendix is normal.  No focal colonic wall thickening.  Bright mural enhancement in the anorectal region is likely related to enhancement of collateral arterial pathways.    Peritoneum: No free fluid. No intraperitoneal free air.    IVC: Diffusely flattened appearance.  In some patients this finding may be associated with hypovolemia/shock.    Lymph Nodes: No pathologic latrice enlargement in the abdomen or pelvis.    Bones: Diffuse osteopenia limits CT assessment of the bones.  Allowing for this, there is a subtle fracture line extending through the superior aspect of the medial wall of the left acetabulum, and another fracture line possibly involving the posterosuperior aspect of the acetabulum near the acetabular rim.    There is also a minimally comminuted left inferior pubic ramus fracture which possibly demonstrates some minimal ossified periosteal callus formation, suggesting a subacute fracture.    Endosteal sclerosis in zone 1 of the left sacral wing, suspicious for endosteal callus related to a healed or healing zone 1 fracture.    Slight depression of the superior endplates of L2 and L3, likely corresponding to the findings described in the comparison outside imaging report.    Soft Tissues: There is a skin and soft tissue contour irregularity in the medial aspect of the right upper thigh suspicious for possible open wound..  Impression: Bilateral above-the-knee amputations.    Extensive heterogeneous atherosclerotic plaque throughout the intra-abdominal aorta.    Complete occlusion of the infrarenal abdominal aorta and iliac arteries, compatible with aortoiliac occlusive disease (Leriche syndrome).    Occluded common femoral and superficial femoral arteries bilaterally,  with trace collateral opacification of the bilateral deep femoral arteries to each distal AKA stump.    Patent but critically stenosed proximal SMA.    KWAKU possibly occluded at its origin but promptly reconstitutes via collateral flow.    These vascular findings may represent worsening from those described in the report of the outside CTA runoff from 01/15/2025.    Left inferior pubic ramus fracture, likely subacute.    Subtle left acetabular fracture, acute or subacute.  Consider MRI if further imaging assessment is clinically warranted.  With appropriate pain control, a complete radiographic series of the pelvis (AP, bilateral Judet views, inlet and outlet views), might also be helpful.    Endosteal sclerosis in the left sacral wing, possibly representing endosteal callus from a healed or healing left zone 1 sacral wing fracture.    Presumed open wound in the medial soft tissues of the right upper thigh.  Correlate clinically.    This report was flagged in Epic as abnormal.    Electronically signed by resident: Diandra Varela  Date:    01/28/2025  Time:    04:40    Electronically signed by: Rob Rodgers  Date:    01/28/2025  Time:    07:42

## 2025-01-28 NOTE — CARE UPDATE
[facility] Sepsis Follow Up Note     Patient's sepsis care was reviewed and a reassessment was performed within 6 hours of sepsis care/ workup that showed  adequate tissue perfusion (not worsening to LE) by non invasice measurements  on 01/28/2025 at 10:49 AM.    Patient has received appropriate sepsis care and a fluid challenge of 500 cc as BP responded to small bolus.    Tripp Ornelas, PA-C  [facility]

## 2025-01-28 NOTE — ASSESSMENT & PLAN NOTE
Patient is identified as having Diastolic (HFpEF) heart failure that is Chronic. CHF is currently controlled.   - Repeat echo pending   - Continue Beta Blocker ACE/ARB and monitor clinical status closely.   - Monitor on telemetry.  - Patient is off CHF pathway.   - Monitor strict Is&Os and daily weights. Place on fluid restriction of 1.5 L.   - Continue to stress to patient importance of self efficacy and  on diet for CHF.

## 2025-01-28 NOTE — SUBJECTIVE & OBJECTIVE
"Past Medical History:   Diagnosis Date    PAD (peripheral artery disease)        Past Surgical History:   Procedure Laterality Date    LEG AMPUTATION         Review of patient's allergies indicates:  No Known Allergies    Medications:  (Not in a hospital admission)    Antibiotics (From admission, onward)      Start     Stop Route Frequency Ordered    01/28/25 1200  piperacillin-tazobactam (ZOSYN) 4.5 g in D5W 100 mL IVPB (MB+)         -- IV Every 8 hours (non-standard times) 01/28/25 0717    01/28/25 1130  vancomycin (VANCOCIN) 500 mg in D5W 100 mL IVPB (MB+)         -- IV Every 12 hours (non-standard times) 01/28/25 1119    01/28/25 0816  vancomycin - pharmacy to dose  (vancomycin IVPB (PEDS and ADULTS))        Placed in "And" Linked Group    -- IV pharmacy to manage frequency 01/28/25 0717          Antifungals (From admission, onward)      None          Antivirals (From admission, onward)      None               There is no immunization history on file for this patient.    Family History    None       Social History     Socioeconomic History    Marital status: Single   Tobacco Use    Smoking status: Never    Smokeless tobacco: Never   Substance and Sexual Activity    Alcohol use: Yes    Drug use: Not Currently     Social Drivers of Health     Financial Resource Strain: Low Risk  (7/31/2024)    Received from [facility] [place] [facility]    Overall Financial Resource Strain (CARDIA)     Difficulty of Paying Living Expenses: Not hard at all   Food Insecurity: No Food Insecurity (1/2/2025)    Received from [facility] [place] [facility]    Hunger Vital Sign     Worried About Running Out of Food in the Last Year: Never true     Ran Out of Food in the Last Year: Never true   Transportation Needs: No Transportation Needs (1/2/2025)    Received from [facility] [place] [facility]    PRAPARE - Transportation     Lack of Transportation (Medical): No     Lack of Transportation (Non-Medical): No   Physical Activity: Insufficiently Active (7/31/2024)    Received from [facility] "    Exercise Vital Sign     Days of Exercise per Week: 2 days     Minutes of Exercise per Session: 20 min   Stress: No Stress Concern Present (7/31/2024)    Received from [facility] [place] [facility] - Occupational Stress Questionnaire     Feeling of Stress : Not at all   Housing Stability: Low Risk  (1/2/2025)    Received from [facility]    Housing Stability Vital Sign     Unable to Pay for Housing in the Last Year: No     Number of Times Moved in the Last Year: 1     Homeless in the Last Year: No     Review of Systems   Constitutional:  Negative for appetite change, chills, fatigue and fever.   Respiratory:  Negative for chest tightness, shortness of breath and wheezing.    Cardiovascular:  Negative for chest pain and palpitations.   Gastrointestinal:  Negative for abdominal distention, abdominal pain, constipation, diarrhea, nausea and vomiting.   Genitourinary:  Negative for decreased urine volume, difficulty urinating, dysuria, flank pain, frequency and urgency.   Musculoskeletal:         Hip pain   Skin:  Positive for wound. Negative for rash.     Objective:     Vital Signs (Most Recent):  Temp: 97.9 °F (36.6 °C) (01/28/25 0642)  Pulse: 95 (01/28/25 1330)  Resp: 18 (01/28/25 1330)  BP: 136/61 (01/28/25 1330)  SpO2: 97 % (01/28/25 1330) Vital Signs (24h Range):  Temp:  [97.9 °F (36.6 °C)-99.5 °F (37.5 °C)] 97.9 °F (36.6 °C)  Pulse:  [] 95  Resp:  [10-24] 18  SpO2:  [95 %-100 %] 97 %  BP: ()/(56-83) 136/61     Weight: 41.3 kg (91 lb)  Body mass index is 17.77 kg/m².    Estimated Creatinine Clearance: 60.9 mL/min (based on SCr of 0.6 mg/dL).     Physical Exam  Vitals and nursing note reviewed.   Constitutional:       General: She is not in acute distress.     Appearance: She is not ill-appearing.   Cardiovascular:      Rate and Rhythm: Normal rate and regular rhythm.      Pulses: Normal pulses.      Heart sounds: No murmur heard.  Pulmonary:      Effort: No respiratory  "distress.   Abdominal:      General: There is no distension.      Tenderness: There is no abdominal tenderness.   Musculoskeletal:         General: No swelling or tenderness.   Skin:     General: Skin is warm and dry.   Psychiatric:         Behavior: Behavior normal.          Significant Labs: Blood Culture:   Recent Labs   Lab 01/27/25  2158   LABBLOO No Growth to date  No Growth to date     CBC:   Recent Labs   Lab 01/27/25 2139 01/28/25  1324   WBC 14.08* 12.46   HGB 13.0 11.3*   HCT 38.6 33.6*    189     CMP:   Recent Labs   Lab 01/27/25 2139   *   K 2.8*   CL 94*   CO2 27   *   BUN 23   CREATININE 0.6   CALCIUM 8.5*   PROT 8.1   ALBUMIN 2.5*   BILITOT 1.2*   ALKPHOS 88   *   ALT 59*   ANIONGAP 14     Wound Culture: No results for input(s): "LABAERO" in the last 4320 hours.  All pertinent labs within the past 24 hours have been reviewed.    Significant Imaging: I have reviewed all pertinent imaging results/findings within the past 24 hours.  "

## 2025-01-28 NOTE — ASSESSMENT & PLAN NOTE
Leriche syndrome  Femoral artery occlusion  SMA stenosis     - CTA run-off with complete occlusion of the infrarenal abdominal aorta and iliac arteries, compatible with aortoiliac occlusive disease (Leriche syndrome); Occluded common femoral and superficial femoral arteries bilaterally, with trace collateral opacification of the bilateral deep femoral arteries to each distal AKA stump; patent but critically stenosed proximal SMA  - Vascular surgery consulted, appreciate recs  - Continue ASA and plavix   - Smoking cessation & secondary prevention discussed at length

## 2025-01-28 NOTE — ASSESSMENT & PLAN NOTE
Patient's most recent potassium results are listed below.   Recent Labs     01/27/25  2139   K 2.8*   Plan  - Replete potassium per protocol  - Monitor potassium Every 12 hours  - Patient's hypokalemia is stable

## 2025-01-28 NOTE — H&P
Fr[facility]stein - Emergency Dept  [facility] Medicine  History & Physical    Patient Name: Daina Ngo  MRN: [id number]  Patient Class: IP- Inpatient  Admission Date: 1/27/2025  Attending Physician: Dov España MD   Primary Care Provider: Page, Primary Doctor         Patient information was obtained from patient, relative(s), and ER records.     Subjective:     Principal Problem:Open wound of right lower extremity with complication    Chief Complaint:   Chief Complaint   Patient presents with    Wound Check     Seen at [facility] last week, came to get a second opinion on her wounds. Pt c/o pain all over.         HPI: Daina Ngo is a 65 y.o. F with severe PAD s/p bilateral AKAs, HTN, HLD, aortic occlusion, hepatitis C, nicotine dependence, and depression/anxiety who presented to the ED for evaluation of multiple wounds. Per chart review, pt was recently admitted for 8-day stay at [facility] for infected decubitus ulcers, where she was treated with broad spectrum antibiotics and transitioned to augmentin and doxycycline at discharge (discharged 1/20). During that admission, she was also found to have an almost totally occluded descending abdominal aorta for which no interventions were performed & also had recurrent urinary retention for which urology placed a suprapubic catheter. She was discharged home with home health but now presents to ED for evaluation given progressively worsening wound pain (predominately to the R stump and buttocks) and debility with new inability to safely care for herself at home. She reports compliance with augmentin and doxycycline regimen at home but believes her wounds are not improving and have become more painful since her discharge. Pt denies fever, chills, chest pain, palpitations, SOB, cough, abdominal pain, N/V/D, HA, or syncope.    In the ED: Mildly tachycardic to 100s and hypotensive to 80s/60s which improved with IVFs o/w VSSAF.  CBC with leukocytosis to 14. CMP with K 2.8,  , ALT 59. UA with 38 WBCs 2+ leuk esterase, and occasional yeast. CTA run-off with complete occlusion of the infrarenal abdominal aorta and iliac arteries, compatible with aortoiliac occlusive disease (Leriche syndrome); Occluded common femoral and superficial femoral arteries bilaterally, with trace collateral opacification of the bilateral deep femoral arteries to each distal AKA stump; patent but critically stenosed proximal SMA; and multiple acute vs subacute pelvic/acetabular fractures. Pt was given vancomycin, zosyn, potassium, morphine, dilaudid, and IVFs and was admitted to hospital medicine for further management.     Past Medical History:   Diagnosis Date    PAD (peripheral artery disease)        Past Surgical History:   Procedure Laterality Date    LEG AMPUTATION         Review of patient's allergies indicates:  No Known Allergies    No current facility-administered medications on file prior to encounter.     Current Outpatient Medications on File Prior to Encounter   Medication Sig    ALPRAZolam (XANAX) 0.5 MG tablet Take 1 tablet by mouth every 6 (six) hours as needed.    amoxicillin-clavulanate 875-125mg (AUGMENTIN) 875-125 mg per tablet Take 1 tablet by mouth 2 (two) times daily.    chlorthalidone (HYGROTEN) 25 MG Tab Take 1 tablet by mouth once daily.    ciprofloxacin HCl (CIPRO) 500 MG tablet Take 500 mg by mouth 2 (two) times daily.    doxycycline (VIBRAMYCIN) 100 MG Cap Take 100 mg by mouth every 12 (twelve) hours.    EPCLUSA 400-100 mg Tab Take 1 tablet by mouth.    losartan (COZAAR) 50 MG tablet Take 1 tablet by mouth once daily.    traMADoL (ULTRAM) 50 mg tablet Take 50 mg by mouth every 8 (eight) hours as needed.    atorvastatin (LIPITOR) 40 MG tablet Take 40 mg by mouth.    clopidogreL (PLAVIX) 75 mg tablet Take 75 mg by mouth.    gabapentin (NEURONTIN) 400 MG capsule Take 1 capsule (400 mg total) by mouth 3 (three) times daily    metoprolol succinate (TOPROL-XL) 50 MG 24 hr tablet  Take 50 mg by mouth.     Family History    None       Tobacco Use    Smoking status: Never    Smokeless tobacco: Never   Substance and Sexual Activity    Alcohol use: Yes    Drug use: Not Currently    Sexual activity: Not on file     Review of Systems   Constitutional:  Positive for activity change. Negative for chills and fever.   HENT:  Negative for trouble swallowing.    Eyes:  Negative for photophobia and visual disturbance.   Respiratory:  Negative for cough, chest tightness and shortness of breath.    Cardiovascular:  Negative for chest pain, palpitations and leg swelling.   Gastrointestinal:  Positive for constipation (2/2 opioid use). Negative for abdominal pain, diarrhea, nausea and vomiting.   Genitourinary:  Positive for difficulty urinating and hematuria. Negative for pelvic pain.   Musculoskeletal:  Positive for arthralgias and gait problem. Negative for back pain and neck pain.   Skin:  Positive for color change and wound.   Neurological:  Negative for dizziness, syncope, speech difficulty and light-headedness.   Psychiatric/Behavioral:  Positive for confusion (intermittent since recent hospitalization per family). Negative for agitation. The patient is not nervous/anxious.      Objective:     Vital Signs (Most Recent):  Temp: 97.9 °F (36.6 °C) (01/28/25 0642)  Pulse: 95 (01/28/25 1115)  Resp: 18 (01/28/25 1131)  BP: 94/65 (01/28/25 0642)  SpO2: 97 % (01/28/25 0642) Vital Signs (24h Range):  Temp:  [97.9 °F (36.6 °C)-99.5 °F (37.5 °C)] 97.9 °F (36.6 °C)  Pulse:  [] 95  Resp:  [10-24] 18  SpO2:  [95 %-100 %] 97 %  BP: ()/(56-77) 94/65     Weight: 41.3 kg (91 lb)  Body mass index is 17.77 kg/m².     Physical Exam  Vitals and nursing note reviewed.   Constitutional:       General: She is not in acute distress.     Appearance: She is underweight. She is ill-appearing (2/2 pain). She is not toxic-appearing.   HENT:      Head: Normocephalic and atraumatic.      Mouth/Throat:      Mouth: Mucous  membranes are dry.   Eyes:      Extraocular Movements: Extraocular movements intact.      Conjunctiva/sclera: Conjunctivae normal.   Cardiovascular:      Rate and Rhythm: Normal rate and regular rhythm.      Heart sounds: Normal heart sounds.   Pulmonary:      Effort: Pulmonary effort is normal. No respiratory distress.      Breath sounds: Normal breath sounds.   Abdominal:      General: Bowel sounds are normal. There is no distension.      Palpations: Abdomen is soft.      Tenderness: There is no abdominal tenderness.      Comments: Suprapubic catheter in place draining shae colored urine   Musculoskeletal:         General: Tenderness and deformity present. Normal range of motion.      Cervical back: Normal range of motion and neck supple.      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      Left Lower Extremity: Left leg is amputated above knee.   Skin:     General: Skin is warm and dry.      Capillary Refill: Capillary refill takes less than 2 seconds.      Findings: Erythema and wound present.   Neurological:      General: No focal deficit present.      Mental Status: She is oriented to person, place, and time.      Motor: Weakness (generalized) present.   Psychiatric:         Behavior: Behavior normal.         Thought Content: Thought content normal.         Judgment: Judgment normal.     Right hip:      Right thigh:       Left AKA stump:       Sacrum:              Significant Labs: All pertinent labs within the past 24 hours have been reviewed.    Significant Imaging: I have reviewed all pertinent imaging results/findings within the past 24 hours.  CTA Runoff ABD PEL Bilat Lower Ext  Narrative: EXAMINATION:  CTA RUNOFF ABD PEL BILAT LOWER EXT    CLINICAL HISTORY:  Arterial embolism, lower extremity;known aortic occlusion;    TECHNIQUE:  CTA of abdomen, pelvis, and bilateral lower extremities performed with 100 mL Omnipaque 350 intravenous contrast.    COMPARISON:  Outside CTA runoff report  dated 01/15/2025    FINDINGS:  Vasculature:    Intra-abdominal aorta: No aneurysm.  No dissection, penetrating atherosclerotic ulcer or intramural hematoma.    Extensive heterogeneous atherosclerotic plaque of the intra-abdominal aorta with dense noncalcified atheromatous plaque of the infrarenal abdominal aorta.    The celiac artery, SMA, and bilateral renal arteries remain patent with moderate noncalcific atheromatous plaque at their origins.    Note however that there is a critical stenosis of the proximal SMA.    KWAKU possibly occluded at its origin but promptly reconstitutes via collateral flow.    There is complete occlusion of the infrarenal abdominal aorta.    The bilateral common iliac arteries and external and internal iliac arteries are occluded.    Postsurgical changes of the left, and possibly also the right, groin.    A synthetic arterial bypass graft extending from the left external iliac region to the left deep femoral artery distribution is occluded.    The common femoral, superficial femoral, and deep femoral arteries are occluded bilaterally.    There are numerous intramuscular collateral arteries in the abdominopelvic body wall and thighs.    VISUALIZED LOWER CHEST:    Lungs/Pleura: Respiratory motion artifact.  No focal consolidation or pleural effusion in the visualized lungs.    Heart: Normal size.  No pericardial effusion.    Thoracic soft tissues: No significant abnormality.    ABDOMEN/PELVIS:    Liver: Normal size with smooth contours.  Heterogeneous hepatic attenuation most likely relating to transient hepatic attenuation differences on these arterial phase images.  Allowing for this., no discrete hepatic mass is demonstrated.    Gallbladder: Partially contracted without calcified gallstones.  No pericholecystic inflammatory changes.    Bile ducts: No intrahepatic or extrahepatic biliary ductal dilatation.    Spleen: Unremarkable.    Pancreas: No mass.  No ductal dilatation. No  peripancreatic fat stranding.    Adrenals: Nonspecific thickening of the left adrenal gland    Renal/Ureters: The kidneys are normal in size with bilateral symmetric enhancement.  Subcentimeter subcortical hypodensities in the upper pole and midpole of the left kidney (axial series 3, images 149, 199), too small to characterize..  No obstructing nephrolithiasis or hydronephrosis.  No evidence of obstructive uropathy.  The urinary bladder is completely decompressed about a suprapubic catheter.    Reproductive: Uterus is without significant abnormality. No adnexal mass.  Prominent engorged bilateral adnexal/parametrial vessels; in the appropriate clinical context, this imaging finding may be associated with signs or symptoms of pelvic congestion syndrome.    Stomach/Bowel: Stomach is normal.  Small and large bowel are normal caliber without obstruction or evidence of inflammation.  Appendix is normal.  No focal colonic wall thickening.  Bright mural enhancement in the anorectal region is likely related to enhancement of collateral arterial pathways.    Peritoneum: No free fluid. No intraperitoneal free air.    IVC: Diffusely flattened appearance.  In some patients this finding may be associated with hypovolemia/shock.    Lymph Nodes: No pathologic latrice enlargement in the abdomen or pelvis.    Bones: Diffuse osteopenia limits CT assessment of the bones.  Allowing for this, there is a subtle fracture line extending through the superior aspect of the medial wall of the left acetabulum, and another fracture line possibly involving the posterosuperior aspect of the acetabulum near the acetabular rim.    There is also a minimally comminuted left inferior pubic ramus fracture which possibly demonstrates some minimal ossified periosteal callus formation, suggesting a subacute fracture.    Endosteal sclerosis in zone 1 of the left sacral wing, suspicious for endosteal callus related to a healed or healing zone 1  fracture.    Slight depression of the superior endplates of L2 and L3, likely corresponding to the findings described in the comparison outside imaging report.    Soft Tissues: There is a skin and soft tissue contour irregularity in the medial aspect of the right upper thigh suspicious for possible open wound..  Impression: Bilateral above-the-knee amputations.    Extensive heterogeneous atherosclerotic plaque throughout the intra-abdominal aorta.    Complete occlusion of the infrarenal abdominal aorta and iliac arteries, compatible with aortoiliac occlusive disease (Leriche syndrome).    Occluded common femoral and superficial femoral arteries bilaterally, with trace collateral opacification of the bilateral deep femoral arteries to each distal AKA stump.    Patent but critically stenosed proximal SMA.    KWAKU possibly occluded at its origin but promptly reconstitutes via collateral flow.    These vascular findings may represent worsening from those described in the report of the outside CTA runoff from 01/15/2025.    Left inferior pubic ramus fracture, likely subacute.    Subtle left acetabular fracture, acute or subacute.  Consider MRI if further imaging assessment is clinically warranted.  With appropriate pain control, a complete radiographic series of the pelvis (AP, bilateral Judet views, inlet and outlet views), might also be helpful.    Endosteal sclerosis in the left sacral wing, possibly representing endosteal callus from a healed or healing left zone 1 sacral wing fracture.    Presumed open wound in the medial soft tissues of the right upper thigh.  Correlate clinically.    This report was flagged in Epic as abnormal.    Electronically signed by resident: Diandra Varela  Date:    01/28/2025  Time:    04:40    Electronically signed by: Rob Rodgers  Date:    01/28/2025  Time:    07:42    Assessment/Plan:     * Open wound of right lower extremity with complication  Pressure injury of the sacral  region  Pressure ulcer involving back and right hip   Peripheral vascular disease s/p bilateral AKA  - 65 y.o. F with severe vascular disease presenting to the ED for worsening of vascular and decubitus wounds despite compliance with outpatient antibiotics and wound care as described above  - 2/4 SIRS on admission for tachycardia > 90 and leukocytosis to 14   - CTA run-off c/w severe vascular disease and multiple occlusions as described above  - Poor wound healing likely 2/2 severe vascular disease and malnutrition vs. ongoing infection   - Vascular surgery and wound care consulted   - Given vancomycin & zosyn in the ED, continue for now pending ID recommendations    Aortic occlusion  Leriche syndrome  Femoral artery occlusion  SMA stenosis     - CTA run-off with complete occlusion of the infrarenal abdominal aorta and iliac arteries, compatible with aortoiliac occlusive disease (Leriche syndrome); Occluded common femoral and superficial femoral arteries bilaterally, with trace collateral opacification of the bilateral deep femoral arteries to each distal AKA stump; patent but critically stenosed proximal SMA  - Vascular surgery consulted, appreciate recs  - Continue ASA and plavix   - Smoking cessation & secondary prevention discussed at length     Body mass index (BMI) less than 19  - Body mass index is 17.77 kg/m².   - Encourage maximal PO intake and monitor closely for weight changes.  - Nutrition consulted     Cigarette nicotine dependence with nicotine-induced disorder  - Dangers of cigarette smoking were reviewed in detail for ~12 minutes and pt was encouraged to quit.  - Nicotine replacement options discussed and ordered     Debility  Patient with acute on chronic debility due to bed confinement status and worsening pain to multiple decubitus & vascular wounds. The patient's latest AMPAC (Activity Measure for Post Acute Care) Score is listed below.    AM-PAC Score - How much help does the patient need for  each activity listed       Plan  - Progressive mobility protocol initated  - PT/OT consulted  - Fall precautions in place    Benign essential HTN  Patient's blood pressure range in the last 24 hours was: BP  Min: 89/62  Max: 151/83.The patient's inpatient anti-hypertensive regimen is listed below:  Current Antihypertensives  chlorthalidone tablet 25 mg, Daily, Oral  losartan tablet 50 mg, Daily, Oral  metoprolol succinate (TOPROL-XL) 24 hr tablet 50 mg, Daily, Oral    Plan  - BP is controlled, no changes needed to their regimen  - Hold home chlorthalidone for now given low normal BP - restart when medically indicated     Urinary retention  Suprapubic catheter  - Recurrent issue from recent admission, s/p placement of a suprapubic catheter at [facility]   - Denies abdominal/pelvic pain, CVA tenderness, drainage difficulties, etc  - UA with 38 WBCs, 2+ leukocyte esterase, and occasional yeast but unclear if UA was obtained prior to catheter exchange  - Order placed to exchange suprapubic cath & repeat urine studies    Transaminitis  Hepatitis C  - , ALT 59 on admission   - Recently started on Epclusa as outpatient for treatment of hepatitis C, which the patient has not started yet  - Continue to monitor LFTs on daily labs  - Avoid hepatotoxic agents as able    Hypokalemia  Patient's most recent potassium results are listed below.   Recent Labs     01/27/25  2139   K 2.8*   Plan  - Replete potassium per protocol  - Monitor potassium Every 12 hours  - Patient's hypokalemia is stable    Peripheral neuropathy  - Chronic issue  - Continue home gabapentin TID - increase as indicated to optimize pain control   - Would benefit from pain management follow-up at discharge     Atherosclerotic heart disease of native coronary artery without angina pectoris  S/p CABG x4  Patient with known CAD s/p CABG, which is controlled. Will continue ASA, Plavix, and Statin and monitor for S/Sx of angina/ACS. Continue to monitor on  telemetry.     Diastolic CHF, chronic  Patient is identified as having Diastolic (HFpEF) heart failure that is Chronic. CHF is currently controlled.   - Repeat echo pending   - Continue Beta Blocker ACE/ARB and monitor clinical status closely.   - Monitor on telemetry.  - Patient is off CHF pathway.   - Monitor strict Is&Os and daily weights. Place on fluid restriction of 1.5 L.   - Continue to stress to patient importance of self efficacy and  on diet for CHF.    Mixed hyperlipidemia  - Continue statin      VTE Risk Mitigation (From admission, onward)           Ordered     IP VTE LOW RISK PATIENT  Once         01/28/25 0713     Place sequential compression device  Until discontinued         01/28/25 0713                               Pharmacokinetic Initial Assessment: IV Vancomycin    Assessment/Plan:    Initiate intravenous vancomycin with loading dose of 750 mg once followed by a maintenance dose of vancomycin 500 mg IV every 12 hours. Desired empiric serum trough concentration is 10 to 20 mcg/mL. Draw vancomycin trough level 60 min prior to fourth dose on 1/29 at approximately 1000. Pharmacy will continue to follow and monitor vancomycin.      Please contact pharmacy at extension [phone] with any questions regarding this assessment.     Thank you for the consult,   Chad TruongD       Patient brief summary:  Daina Ngo is a 65 y.o. female initiated on antimicrobial therapy with IV Vancomycin for treatment of suspected skin & soft tissue infection    Drug Allergies:   Review of patient's allergies indicates:  No Known Allergies    Actual Body Weight:   41.3 kg    Renal Function:   Estimated Creatinine Clearance: 60.9 mL/min (based on SCr of 0.6 mg/dL).,     Dialysis Method (if applicable):  N/A    CBC (last 72 hours):  Recent Labs   Lab Result Units 01/27/25  2139   WBC K/uL 14.08*   Hemoglobin g/dL 13.0   Hematocrit % 38.6   Platelets K/uL 217   Gran % % 74.4*   Lymph % % 19.2   Mono % %  "6.0   Eosinophil % % 0.0   Basophil % % 0.1   Differential Method  Automated       Metabolic Panel (last 72 hours):  Recent Labs   Lab Result Units 01/27/25 2139 01/27/25 2228   Sodium mmol/L 135*  --    Potassium mmol/L 2.8*  --    Chloride mmol/L 94*  --    CO2 mmol/L 27  --    Glucose mg/dL 118*  --    Glucose, UA   --  Negative   BUN mg/dL 23  --    Creatinine mg/dL 0.6  --    Albumin g/dL 2.5*  --    Total Bilirubin mg/dL 1.2*  --    Alkaline Phosphatase U/L 88  --    AST U/L 197*  --    ALT U/L 59*  --        Drug levels (last 3 results):  No results for input(s): "VANCOMYCINRA", "VANCORANDOM", "VANCOMYCINPE", "VANCOPEAK", "VANCOMYCINTR", "VANCOTROUGH" in the last 72 hours.    Microbiologic Results:  Microbiology Results (last 7 days)       Procedure Component Value Units Date/Time    Blood culture #2 **CANNOT BE ORDERED STAT** [[id number]] Collected: 01/27/25 2158    Order Status: Completed Specimen: Blood from Peripheral, Antecubital, Right Updated: 01/28/25 0715     Blood Culture, Routine No Growth to date    Blood culture #1 **CANNOT BE ORDERED STAT** [[id number]] Collected: 01/27/25 2158    Order Status: Completed Specimen: Blood from Peripheral, Forearm, Left Updated: 01/28/25 0715     Blood Culture, Routine No Growth to date    Urine culture [[id number]] Collected: 01/27/25 2228    Order Status: No result Specimen: Urine Updated: 01/27/25 9392              Ninoska Odonnell PA-C  Department of Hospital Medicine  [facility] - Emergency Dept          "

## 2025-01-28 NOTE — ASSESSMENT & PLAN NOTE
- Dangers of cigarette smoking were reviewed in detail for ~12 minutes and pt was encouraged to quit.  - Nicotine replacement options discussed and ordered

## 2025-01-28 NOTE — ASSESSMENT & PLAN NOTE
Suprapubic catheter  - Recurrent issue from recent admission, s/p placement of a suprapubic catheter at [facility]   - Denies abdominal/pelvic pain, CVA tenderness, drainage difficulties, etc  - UA with 38 WBCs, 2+ leukocyte esterase, and occasional yeast but unclear if UA was obtained prior to catheter exchange  - Order placed to exchange suprapubic cath & repeat urine studies

## 2025-01-28 NOTE — ASSESSMENT & PLAN NOTE
Daina Ngo is a 66 yo female with a pmhx of aortic occlusive disease, PAD s/p bilateral AKA in 2018 presenting with chronic wounds and pain s/p recent R thigh debridement.     - No revascularization option available for this patient.   - Recommend continued local wound care.   - Consider palliative consult.

## 2025-01-28 NOTE — ASSESSMENT & PLAN NOTE
S/p CABG x4  Patient with known CAD s/p CABG, which is controlled. Will continue ASA, Plavix, and Statin and monitor for S/Sx of angina/ACS. Continue to monitor on telemetry.

## 2025-01-28 NOTE — PROVIDER PROGRESS NOTES - EMERGENCY DEPT.
Encounter Date: 1/27/2025    ED Physician Progress Notes            ED Resident HAND-OFF NOTE:  Daina Ngo is a 65 y.o. female who presented to the ED on 1/28/2025, patient C/O wounds. I assumed care of patient from off-going ED physician team patient pending CTA, admission.    Briefly, this is a 65-year-old female with history of chronic arterial insufficiency.  Patient was recently admitted to outside hospital for infected decubitus ulcers.  Patient was discharged on doxycycline and Augmentin.  Patient presenting due to pain around lower extremity wounds and wishing to pursue a 2nd opinion.  She is scheduled for a vascular surgery outpatient appointment at [facility] on 01/28.  Patient was given Zosyn for infected ulcers.  Patient had transient episodes of hypotension during ED stay.    On my evaluation, Daina Ngo appears well, hemodynamically stable and in NAD. Thus far, Daina Ngo has received:  Medications   piperacillin-tazobactam (ZOSYN) 4.5 g in D5W 100 mL IVPB (MB+) (4.5 g Intravenous New Bag 1/28/25 4750)   oxyCODONE immediate release tablet 5 mg (has no administration in time range)   vancomycin 750 mg in 0.9% NaCl 250 mL IVPB (admixture device) (has no administration in time range)   piperacillin-tazobactam (ZOSYN) 4.5 g in D5W 100 mL IVPB (MB+) (0 g Intravenous Stopped 1/27/25 2358)   HYDROmorphone injection 0.5 mg (0.5 mg Intravenous Given 1/27/25 2211)   sodium chloride 0.9% bolus 500 mL 500 mL (0 mLs Intravenous Stopped 1/27/25 2358)   iohexoL (OMNIPAQUE 350) injection 100 mL (100 mLs Intravenous Given 1/27/25 2256)   potassium bicarbonate disintegrating tablet 40 mEq (40 mEq Oral Given 1/27/25 2359)   morphine injection 4 mg (4 mg Intravenous Given 1/28/25 0254)       /67   Pulse 88   Temp 98.7 °F (37.1 °C)   Resp 19   Wt 41.3 kg (91 lb)   SpO2 96%   Breastfeeding No   BMI 17.77 kg/m²         Disposition: I anticipate patient will be  admission  ______________________  Ninoska Hidalgo MD   Emergency Medicine Resident      UPDATE:     Upon reassessment, patient endorses severe lower extremity pain primarily around her wounds.  Will give morphine.    CMP showing significant hypokalemia.  Will give oral potassium.    CTA wet read showing complete occlusion of the infrarenal abdominal aorta consistent with previous.  Discussed patient's case with Hospital Medicine who agreed to admit patient to their service for infected wounds, hypokalemia, vascular surgery evaluation.      :  Pressure injury of skin, unspecified injury stage, unspecified location (Primary)  Hypokalemia  Wound infection

## 2025-01-29 PROBLEM — E44.0 MODERATE PROTEIN-CALORIE MALNUTRITION: Status: ACTIVE | Noted: 2025-01-29

## 2025-01-29 LAB
ALBUMIN SERPL BCP-MCNC: 2.1 G/DL (ref 3.5–5.2)
ALBUMIN SERPL BCP-MCNC: 2.1 G/DL (ref 3.5–5.2)
ALP SERPL-CCNC: 83 U/L (ref 40–150)
ALP SERPL-CCNC: 89 U/L (ref 40–150)
ALT SERPL W/O P-5'-P-CCNC: 58 U/L (ref 10–44)
ALT SERPL W/O P-5'-P-CCNC: 59 U/L (ref 10–44)
ANION GAP SERPL CALC-SCNC: 4 MMOL/L (ref 8–16)
ANION GAP SERPL CALC-SCNC: 6 MMOL/L (ref 8–16)
ASCENDING AORTA: 2.94 CM
AST SERPL-CCNC: 175 U/L (ref 10–40)
AST SERPL-CCNC: 178 U/L (ref 10–40)
AV AREA BY CONTINUOUS VTI: 1.8 CM2
AV INDEX (PROSTH): 0.65
AV LVOT MEAN GRADIENT: 3 MMHG
AV LVOT PEAK GRADIENT: 5 MMHG
AV MEAN GRADIENT: 7 MMHG
AV PEAK GRADIENT: 13 MMHG
AV REGURGITATION PRESSURE HALF TIME: 452 MS
AV VALVE AREA BY VELOCITY RATIO: 1.9 CM²
AV VALVE AREA: 1.8 CM2
AV VELOCITY RATIO: 0.67
BACTERIA UR CULT: NO GROWTH
BILIRUB SERPL-MCNC: 1.1 MG/DL (ref 0.1–1)
BILIRUB SERPL-MCNC: 1.2 MG/DL (ref 0.1–1)
BUN SERPL-MCNC: 15 MG/DL (ref 8–23)
BUN SERPL-MCNC: 15 MG/DL (ref 8–23)
CALCIUM SERPL-MCNC: 7.7 MG/DL (ref 8.7–10.5)
CALCIUM SERPL-MCNC: 7.8 MG/DL (ref 8.7–10.5)
CHLORIDE SERPL-SCNC: 100 MMOL/L (ref 95–110)
CHLORIDE SERPL-SCNC: 102 MMOL/L (ref 95–110)
CO2 SERPL-SCNC: 24 MMOL/L (ref 23–29)
CO2 SERPL-SCNC: 24 MMOL/L (ref 23–29)
CREAT SERPL-MCNC: 0.5 MG/DL (ref 0.5–1.4)
CREAT SERPL-MCNC: 0.5 MG/DL (ref 0.5–1.4)
CV ECHO LV RWT: 0.27 CM
DOP CALC AO PEAK VEL: 1.8 M/S
DOP CALC AO VTI: 31.2 CM
DOP CALC LVOT AREA: 2.8 CM2
DOP CALC LVOT DIAMETER: 1.9 CM
DOP CALC LVOT PEAK VEL: 1.2 M/S
DOP CALC LVOT STROKE VOLUME: 57.2 CM3
DOP CALCLVOT PEAK VEL VTI: 20.2 CM
E WAVE DECELERATION TIME: 206 MS
E/A RATIO: 0.79
E/E' RATIO: 10 M/S
ECHO EF ESTIMATED: 57 %
ECHO LV POSTERIOR WALL: 0.5 CM (ref 0.6–1.1)
ERYTHROCYTE [DISTWIDTH] IN BLOOD BY AUTOMATED COUNT: 14.5 % (ref 11.5–14.5)
EST. GFR  (NO RACE VARIABLE): >60 ML/MIN/1.73 M^2
EST. GFR  (NO RACE VARIABLE): >60 ML/MIN/1.73 M^2
FRACTIONAL SHORTENING: 29.7 % (ref 28–44)
GLUCOSE SERPL-MCNC: 111 MG/DL (ref 70–110)
GLUCOSE SERPL-MCNC: 91 MG/DL (ref 70–110)
HCT VFR BLD AUTO: 33.1 % (ref 37–48.5)
HGB BLD-MCNC: 10.9 G/DL (ref 12–16)
INTERVENTRICULAR SEPTUM: 0.9 CM (ref 0.6–1.1)
IVC DIAMETER: 1.01 CM
LA MAJOR: 4.7 CM
LA MINOR: 4.5 CM
LA WIDTH: 3 CM
LEFT ATRIUM SIZE: 2.8 CM
LEFT ATRIUM VOLUME MOD: 33 ML
LEFT ATRIUM VOLUME: 33 CM3
LEFT INTERNAL DIMENSION IN SYSTOLE: 2.6 CM (ref 2.1–4)
LEFT VENTRICLE DIASTOLIC VOLUME: 58.53 ML
LEFT VENTRICLE SYSTOLIC VOLUME: 25.29 ML
LEFT VENTRICULAR INTERNAL DIMENSION IN DIASTOLE: 3.7 CM (ref 3.5–6)
LEFT VENTRICULAR MASS: 68.8 G
LV LATERAL E/E' RATIO: 8.2
LV SEPTAL E/E' RATIO: 12.3
MAGNESIUM SERPL-MCNC: 2.7 MG/DL (ref 1.6–2.6)
MCH RBC QN AUTO: 31.5 PG (ref 27–31)
MCHC RBC AUTO-ENTMCNC: 32.9 G/DL (ref 32–36)
MCV RBC AUTO: 96 FL (ref 82–98)
MV A" WAVE DURATION": 79.92 MS
MV PEAK A VEL: 0.62 M/S
MV PEAK E VEL: 0.49 M/S
OHS CV RV/LV RATIO: 1.03 CM
PISA TR MAX VEL: 2.1 M/S
PLATELET # BLD AUTO: 168 K/UL (ref 150–450)
PMV BLD AUTO: 9.3 FL (ref 9.2–12.9)
POCT GLUCOSE: 103 MG/DL (ref 70–110)
POCT GLUCOSE: 119 MG/DL (ref 70–110)
POCT GLUCOSE: 76 MG/DL (ref 70–110)
POCT GLUCOSE: 88 MG/DL (ref 70–110)
POCT GLUCOSE: 91 MG/DL (ref 70–110)
POTASSIUM SERPL-SCNC: 5 MMOL/L (ref 3.5–5.1)
POTASSIUM SERPL-SCNC: 5.4 MMOL/L (ref 3.5–5.1)
PROT SERPL-MCNC: 6.8 G/DL (ref 6–8.4)
PROT SERPL-MCNC: 6.9 G/DL (ref 6–8.4)
PULM VEIN A" WAVE DURATION": 79.92 MS
PULM VEIN S/D RATIO: 1.02
PULMONIC VEIN PEAK A VELOCITY: 0.2 M/S
PV PEAK D VEL: 0.42 M/S
PV PEAK S VEL: 0.43 M/S
RA MAJOR: 3.82 CM
RA PRESSURE ESTIMATED: 3 MMHG
RA WIDTH: 3.24 CM
RBC # BLD AUTO: 3.46 M/UL (ref 4–5.4)
RIGHT ATRIAL AREA: 9.6 CM2
RIGHT VENTRICLE DIASTOLIC BASEL DIMENSION: 3.8 CM
RV TB RVSP: 5 MMHG
RV TISSUE DOPPLER FREE WALL SYSTOLIC VELOCITY 1 (APICAL 4 CHAMBER VIEW): 7.83 CM/S
SINUS: 2.78 CM
SODIUM SERPL-SCNC: 130 MMOL/L (ref 136–145)
SODIUM SERPL-SCNC: 130 MMOL/L (ref 136–145)
STJ: 2.25 CM
TDI LATERAL: 0.06 M/S
TDI SEPTAL: 0.04 M/S
TDI: 0.05 M/S
TR MAX PG: 18 MMHG
TRICUSPID ANNULAR PLANE SYSTOLIC EXCURSION: 1.05 CM
TV PEAK GRADIENT: 18 MMHG
TV REST PULMONARY ARTERY PRESSURE: 21 MMHG
VANCOMYCIN TROUGH SERPL-MCNC: 8.5 UG/ML (ref 10–22)
WBC # BLD AUTO: 9.51 K/UL (ref 3.9–12.7)

## 2025-01-29 PROCEDURE — 63600175 PHARM REV CODE 636 W HCPCS

## 2025-01-29 PROCEDURE — 80202 ASSAY OF VANCOMYCIN: CPT | Performed by: INTERNAL MEDICINE

## 2025-01-29 PROCEDURE — 36415 COLL VENOUS BLD VENIPUNCTURE: CPT | Mod: XB | Performed by: INTERNAL MEDICINE

## 2025-01-29 PROCEDURE — 25000003 PHARM REV CODE 250

## 2025-01-29 PROCEDURE — 80053 COMPREHEN METABOLIC PANEL: CPT | Mod: 91

## 2025-01-29 PROCEDURE — 97165 OT EVAL LOW COMPLEX 30 MIN: CPT

## 2025-01-29 PROCEDURE — 97535 SELF CARE MNGMENT TRAINING: CPT

## 2025-01-29 PROCEDURE — 97162 PT EVAL MOD COMPLEX 30 MIN: CPT

## 2025-01-29 PROCEDURE — 36415 COLL VENOUS BLD VENIPUNCTURE: CPT

## 2025-01-29 PROCEDURE — 25000003 PHARM REV CODE 250: Performed by: INTERNAL MEDICINE

## 2025-01-29 PROCEDURE — 20600001 HC STEP DOWN PRIVATE ROOM

## 2025-01-29 PROCEDURE — 63600175 PHARM REV CODE 636 W HCPCS: Performed by: INTERNAL MEDICINE

## 2025-01-29 PROCEDURE — S4991 NICOTINE PATCH NONLEGEND: HCPCS

## 2025-01-29 PROCEDURE — 97530 THERAPEUTIC ACTIVITIES: CPT

## 2025-01-29 PROCEDURE — 83735 ASSAY OF MAGNESIUM: CPT

## 2025-01-29 PROCEDURE — 80053 COMPREHEN METABOLIC PANEL: CPT

## 2025-01-29 PROCEDURE — 85027 COMPLETE CBC AUTOMATED: CPT

## 2025-01-29 RX ORDER — ALBUTEROL SULFATE 2.5 MG/.5ML
10 SOLUTION RESPIRATORY (INHALATION) ONCE
Status: DISCONTINUED | OUTPATIENT
Start: 2025-01-29 | End: 2025-01-31 | Stop reason: HOSPADM

## 2025-01-29 RX ORDER — METHOCARBAMOL 500 MG/1
500 TABLET, FILM COATED ORAL 4 TIMES DAILY
Status: DISCONTINUED | OUTPATIENT
Start: 2025-01-29 | End: 2025-01-31 | Stop reason: HOSPADM

## 2025-01-29 RX ORDER — CEFTRIAXONE 2 G/1
2 INJECTION, POWDER, FOR SOLUTION INTRAMUSCULAR; INTRAVENOUS ONCE
Status: COMPLETED | OUTPATIENT
Start: 2025-01-29 | End: 2025-01-29

## 2025-01-29 RX ORDER — ENOXAPARIN SODIUM 100 MG/ML
30 INJECTION SUBCUTANEOUS EVERY 24 HOURS
Status: DISCONTINUED | OUTPATIENT
Start: 2025-01-29 | End: 2025-01-31 | Stop reason: HOSPADM

## 2025-01-29 RX ORDER — DOXYCYCLINE HYCLATE 100 MG
100 TABLET ORAL EVERY 12 HOURS
Status: DISCONTINUED | OUTPATIENT
Start: 2025-01-30 | End: 2025-01-31 | Stop reason: HOSPADM

## 2025-01-29 RX ORDER — KETOROLAC TROMETHAMINE 15 MG/ML
15 INJECTION, SOLUTION INTRAMUSCULAR; INTRAVENOUS EVERY 6 HOURS PRN
Status: DISCONTINUED | OUTPATIENT
Start: 2025-01-29 | End: 2025-01-31 | Stop reason: HOSPADM

## 2025-01-29 RX ORDER — AMOXICILLIN AND CLAVULANATE POTASSIUM 875; 125 MG/1; MG/1
1 TABLET, FILM COATED ORAL EVERY 12 HOURS
Status: DISCONTINUED | OUTPATIENT
Start: 2025-01-30 | End: 2025-01-31 | Stop reason: HOSPADM

## 2025-01-29 RX ORDER — IBUPROFEN 400 MG/1
400 TABLET ORAL 3 TIMES DAILY
Status: DISCONTINUED | OUTPATIENT
Start: 2025-01-29 | End: 2025-01-31 | Stop reason: HOSPADM

## 2025-01-29 RX ORDER — ENOXAPARIN SODIUM 100 MG/ML
40 INJECTION SUBCUTANEOUS EVERY 24 HOURS
Status: DISCONTINUED | OUTPATIENT
Start: 2025-01-29 | End: 2025-01-29

## 2025-01-29 RX ADMIN — METHOCARBAMOL 500 MG: 500 TABLET ORAL at 09:01

## 2025-01-29 RX ADMIN — KETOROLAC TROMETHAMINE 15 MG: 15 INJECTION, SOLUTION INTRAMUSCULAR; INTRAVENOUS at 11:01

## 2025-01-29 RX ADMIN — IBUPROFEN 400 MG: 400 TABLET ORAL at 03:01

## 2025-01-29 RX ADMIN — VANCOMYCIN HYDROCHLORIDE 750 MG: 750 INJECTION, POWDER, LYOPHILIZED, FOR SOLUTION INTRAVENOUS at 05:01

## 2025-01-29 RX ADMIN — GABAPENTIN 400 MG: 400 CAPSULE ORAL at 03:01

## 2025-01-29 RX ADMIN — CLOPIDOGREL BISULFATE 75 MG: 75 TABLET ORAL at 08:01

## 2025-01-29 RX ADMIN — CALCIUM CARBONATE (ANTACID) CHEW TAB 500 MG 1000 MG: 500 CHEW TAB at 09:01

## 2025-01-29 RX ADMIN — NICOTINE 1 PATCH: 14 PATCH, EXTENDED RELEASE TRANSDERMAL at 08:01

## 2025-01-29 RX ADMIN — IBUPROFEN 400 MG: 400 TABLET ORAL at 09:01

## 2025-01-29 RX ADMIN — GABAPENTIN 400 MG: 400 CAPSULE ORAL at 09:01

## 2025-01-29 RX ADMIN — SODIUM ZIRCONIUM CYCLOSILICATE 10 G: 10 POWDER, FOR SUSPENSION ORAL at 05:01

## 2025-01-29 RX ADMIN — METHOCARBAMOL 500 MG: 500 TABLET ORAL at 05:01

## 2025-01-29 RX ADMIN — OXYCODONE HYDROCHLORIDE 10 MG: 10 TABLET ORAL at 06:01

## 2025-01-29 RX ADMIN — ASPIRIN 81 MG: 81 TABLET, COATED ORAL at 08:01

## 2025-01-29 RX ADMIN — VANCOMYCIN HYDROCHLORIDE 500 MG: 500 INJECTION, POWDER, LYOPHILIZED, FOR SOLUTION INTRAVENOUS at 01:01

## 2025-01-29 RX ADMIN — CEFTRIAXONE 2 G: 2 INJECTION, POWDER, FOR SOLUTION INTRAMUSCULAR; INTRAVENOUS at 12:01

## 2025-01-29 RX ADMIN — CALCIUM CARBONATE (ANTACID) CHEW TAB 500 MG 1000 MG: 500 CHEW TAB at 08:01

## 2025-01-29 RX ADMIN — ATORVASTATIN CALCIUM 40 MG: 40 TABLET, FILM COATED ORAL at 08:01

## 2025-01-29 RX ADMIN — ENOXAPARIN SODIUM 30 MG: 30 INJECTION SUBCUTANEOUS at 05:01

## 2025-01-29 RX ADMIN — PIPERACILLIN SODIUM AND TAZOBACTAM SODIUM 4.5 G: 4; .5 INJECTION, POWDER, LYOPHILIZED, FOR SOLUTION INTRAVENOUS at 06:01

## 2025-01-29 RX ADMIN — METOPROLOL SUCCINATE 50 MG: 50 TABLET, EXTENDED RELEASE ORAL at 08:01

## 2025-01-29 RX ADMIN — OXYCODONE 5 MG: 5 TABLET ORAL at 05:01

## 2025-01-29 RX ADMIN — OXYCODONE 5 MG: 5 TABLET ORAL at 09:01

## 2025-01-29 RX ADMIN — LOSARTAN POTASSIUM 50 MG: 50 TABLET, FILM COATED ORAL at 08:01

## 2025-01-29 RX ADMIN — KETOROLAC TROMETHAMINE 15 MG: 15 INJECTION, SOLUTION INTRAMUSCULAR; INTRAVENOUS at 03:01

## 2025-01-29 RX ADMIN — GABAPENTIN 400 MG: 400 CAPSULE ORAL at 08:01

## 2025-01-29 RX ADMIN — POLYETHYLENE GLYCOL 3350 17 G: 17 POWDER, FOR SOLUTION ORAL at 08:01

## 2025-01-29 RX ADMIN — OXYCODONE HYDROCHLORIDE 10 MG: 10 TABLET ORAL at 01:01

## 2025-01-29 NOTE — PROGRESS NOTES
[facility] - [facility] Medicine  Progress Note    Patient Name: Daina Ngo  MRN: [id number]  Patient Class: IP- Inpatient   Admission Date: 1/27/2025  Length of Stay: 1 days  Attending Physician: Dov España MD  Primary Care Provider: Page, Primary Doctor    Principal Problem:Debility    HPI:  Daina Ngo is a 65 y.o. F with severe PAD s/p bilateral AKAs, HTN, HLD, aortic occlusion, hepatitis C, nicotine dependence, and depression/anxiety who presented to the ED for evaluation of multiple wounds. Per chart review, pt was recently admitted for 8-day stay at [facility] for infected decubitus ulcers, where she was treated with broad spectrum antibiotics and transitioned to augmentin and doxycycline at discharge (discharged 1/20). During that admission, she was also found to have an almost totally occluded descending abdominal aorta for which no interventions were performed & also had recurrent urinary retention for which urology placed a suprapubic catheter. She was discharged home with home health but now presents to ED for evaluation given progressively worsening wound pain (predominately to the R stump and buttocks) and debility with new inability to safely care for herself at home. She reports compliance with augmentin and doxycycline regimen at home but believes her wounds are not improving and have become more painful since her discharge. Pt denies fever, chills, chest pain, palpitations, SOB, cough, abdominal pain, N/V/D, HA, or syncope.    In the ED: Mildly tachycardic to 100s and hypotensive to 80s/60s which improved with IVFs o/w VSSAF.  CBC with leukocytosis to 14. CMP with K 2.8, , ALT 59. UA with 38 WBCs 2+ leuk esterase, and occasional yeast. CTA run-off with complete occlusion of the infrarenal abdominal aorta and iliac arteries, compatible with aortoiliac occlusive disease (Leriche syndrome); Occluded common femoral and superficial femoral arteries bilaterally,  with trace collateral opacification of the bilateral deep femoral arteries to each distal AKA stump; patent but critically stenosed proximal SMA; and multiple acute vs subacute pelvic/acetabular fractures. Pt was given vancomycin, zosyn, potassium, morphine, dilaudid, and IVFs and was admitted to hospital medicine for further management.     Overview/Hospital Course:  Ms. Ngo was admitted for open wound of the RLE and significant PAD with uncontrolled pain. Vascular surgery consulted at admission and unfortunately no revascularization options are available so local wound care was recommended. Pt was initially started on vancomycin and zosyn and subsequently transitioned to PO doxycycline and augmentin for 14 day course per ID recs. Wound care consulted and following. PT/OT consulted and recommending high intensity therapy. CM following to aid in placement.     Interval History: Pt seen and examined by me this morning. PANKAJ SAINI. Pt lethargic but wakes to voice & is oriented x3; per chart review, pt received oxycodone 10 mg shortly before encounter. PRN pain regimen adjusted to decrease opioid use. No new complaints this morning.     Review of Systems   Constitutional:  Positive for activity change. Negative for chills and fever.   HENT:  Negative for trouble swallowing.    Eyes:  Negative for photophobia and visual disturbance.   Respiratory:  Negative for cough, chest tightness and shortness of breath.    Cardiovascular:  Negative for chest pain, palpitations and leg swelling.   Gastrointestinal:  Positive for constipation (2/2 opioid use). Negative for abdominal pain, diarrhea, nausea and vomiting.   Genitourinary:  Positive for difficulty urinating (suprapubic cath in place). Negative for hematuria and pelvic pain.   Musculoskeletal:  Positive for arthralgias and gait problem. Negative for back pain and neck pain.   Skin:  Positive for color change and wound.   Neurological:  Negative for dizziness,  syncope, speech difficulty and light-headedness.   Psychiatric/Behavioral:  Positive for confusion (intermittent since recent hospitalization per family). Negative for agitation. The patient is not nervous/anxious.      Objective:     Vital Signs (Most Recent):  Temp: 98.3 °F (36.8 °C) (01/29/25 1125)  Pulse: 65 (01/29/25 1125)  Resp: 18 (01/29/25 1125)  BP: 134/68 (01/29/25 1125)  SpO2: 96 % (01/29/25 1125) Vital Signs (24h Range):  Temp:  [97.3 °F (36.3 °C)-98.6 °F (37 °C)] 98.3 °F (36.8 °C)  Pulse:  [65-83] 65  Resp:  [16-20] 18  SpO2:  [96 %-100 %] 96 %  BP: (116-145)/(59-90) 134/68     Weight: 41.3 kg (91 lb)  Body mass index is 17.77 kg/m².  No intake or output data in the 24 hours ending 01/29/25 1408      Physical Exam  Vitals and nursing note reviewed.   Constitutional:       General: She is not in acute distress.     Appearance: She is underweight. She is ill-appearing (chronically).   HENT:      Head: Normocephalic and atraumatic.      Mouth/Throat:      Mouth: Mucous membranes are dry.   Eyes:      Extraocular Movements: Extraocular movements intact.      Conjunctiva/sclera: Conjunctivae normal.   Cardiovascular:      Rate and Rhythm: Normal rate and regular rhythm.      Heart sounds: Normal heart sounds.   Pulmonary:      Effort: Pulmonary effort is normal. No respiratory distress.      Breath sounds: Normal breath sounds.   Abdominal:      General: Bowel sounds are normal. There is no distension.      Palpations: Abdomen is soft.      Tenderness: There is no abdominal tenderness.      Comments: Suprapubic catheter in place draining shae colored urine   Musculoskeletal:         General: Tenderness and deformity present. Normal range of motion.      Cervical back: Normal range of motion and neck supple.      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      Left Lower Extremity: Left leg is amputated above knee.   Skin:     General: Skin is warm and dry.      Capillary Refill: Capillary  refill takes less than 2 seconds.      Findings: Erythema and wound present.      Comments: Open wound to R medial thigh   L AKA stump with dry eschar present  Open R hip and sacral wound   See media   Neurological:      General: No focal deficit present.      Mental Status: She is oriented to person, place, and time and easily aroused. She is lethargic.      Motor: Weakness (generalized) present.   Psychiatric:         Behavior: Behavior normal.         Thought Content: Thought content normal.         Judgment: Judgment normal.             Significant Labs: All pertinent labs within the past 24 hours have been reviewed.    Significant Imaging: I have reviewed all pertinent imaging results/findings within the past 24 hours.    Assessment and Plan     * Debility  Patient with acute on chronic debility due to recent bed confinement status from hospitalization and worsening pain to multiple decubitus & vascular wounds. The patient's latest AMPAC (Activity Measure for Post Acute Care) Score is listed below.    AM-PAC Score - How much help does the patient need for each activity listed       Plan  - Progressive mobility protocol initated  - PT/OT consulted & recommending high intensity therapy  - PM&R consulted   - Fall precautions in place    Open wound of right lower extremity with complication  Pressure injury of the sacral region  Pressure ulcer involving back and right hip   Peripheral vascular disease s/p bilateral AKA  - 65 y.o. F with severe vascular disease presenting to the ED for worsening of vascular and decubitus wounds despite compliance with outpatient antibiotics and wound care as described above  - 2/4 SIRS on admission for tachycardia > 90 and leukocytosis to 14, now resolved   - CTA run-off c/w severe vascular disease and multiple occlusions as described above  - Poor wound healing likely 2/2 severe vascular disease and malnutrition vs. ongoing infection   - Vascular surgery consulted & recommended  local wound care  - Given vancomycin & zosyn in the ED  - ID consulted, appreciate recs:   D/c Zosyn  Continue vancomycin (pharmacy to dose) and start Ceftriaxone 2 g q24 hrs IV until blood cultures result negative x 48 hrs.   If negative, can transition to oral Doxycycline 100 mg PO BID and Augmentin 875/125 mg PO BID x 14 days  - Wound care consulted, appreciate recs   - Adjust pain regimen to include scheduled NSAIDs & robaxin given adequate renal function, utilize opioids minimally given lethargy on oxycodone 10 mg    Aortic occlusion  Leriche syndrome  Femoral artery occlusion  SMA stenosis     - CTA run-off with complete occlusion of the infrarenal abdominal aorta and iliac arteries, compatible with aortoiliac occlusive disease (Leriche syndrome); Occluded common femoral and superficial femoral arteries bilaterally, with trace collateral opacification of the bilateral deep femoral arteries to each distal AKA stump; patent but critically stenosed proximal SMA  - Vascular surgery consulted & unfortunately no revascularization options are available   - Continue ASA and plavix   - Smoking cessation & secondary prevention discussed at length     Body mass index (BMI) less than 19  - Body mass index is 17.77 kg/m².   - Encourage maximal PO intake and monitor closely for weight changes.  - Nutrition consulted     Cigarette nicotine dependence with nicotine-induced disorder  - Dangers of cigarette smoking were reviewed in detail for ~12 minutes and pt was encouraged to quit.  - Nicotine replacement options discussed and ordered     Benign essential HTN  Patient's blood pressure range in the last 24 hours was: BP  Min: 116/62  Max: 145/69.The patient's inpatient anti-hypertensive regimen is listed below:  Current Antihypertensives  chlorthalidone tablet 25 mg, Daily, Oral  losartan tablet 50 mg, Daily, Oral  metoprolol succinate (TOPROL-XL) 24 hr tablet 50 mg, Daily, Oral    Plan  - Hold home chlorthalidone for now  given low normal BP - restart when medically indicated     Urinary retention  Suprapubic catheter  - Recurrent issue from recent admission, s/p placement of a suprapubic catheter at [facility]   - Denies abdominal/pelvic pain, CVA tenderness, drainage difficulties, etc  - UA with 38 WBCs, 2+ leukocyte esterase, and occasional yeast but unclear if UA was obtained prior to catheter exchange  - Order placed to exchange suprapubic cath & repeat urine studies    Transaminitis  Hepatitis C  - , ALT 59 on admission   - Recently started on Epclusa as outpatient for treatment of hepatitis C, which the patient has not started yet  - Continue to monitor LFTs on daily labs  - Avoid hepatotoxic agents as able    Hypokalemia  Resolved  Patient's most recent potassium results are listed below.   Recent Labs     01/28/25  1538 01/29/25  0018 01/29/25  0411   K 2.4* 5.4* 5.0     Plan  - Replete potassium per protocol  - Monitor potassium Daily  - Patient's hypokalemia is stable    Peripheral neuropathy  - Chronic issue  - Continue home gabapentin TID - increase as indicated to optimize pain control   - Would benefit from pain management follow-up at discharge     Atherosclerotic heart disease of native coronary artery without angina pectoris  S/p CABG x4  Patient with known CAD s/p CABG, which is controlled. Will continue ASA, Plavix, and Statin and monitor for S/Sx of angina/ACS. Continue to monitor on telemetry.     Diastolic CHF, chronic  Patient is identified as having Diastolic (HFpEF) heart failure that is Chronic. CHF is currently controlled.   Results for orders placed during the hospital encounter of 01/27/25    Echo    Interpretation Summary    Left Ventricle: The left ventricle is normal in size. Normal wall thickness. Normal wall motion. There is low normal systolic function with a visually estimated ejection fraction of 50 - 55%. There is normal diastolic function.    Right Ventricle: Normal right ventricular cavity  size. Wall thickness is normal. Systolic function is mildly reduced.    Aortic Valve: There is moderate aortic valve sclerosis. There is mild annular calcification present. Mildly restricted motion. There is mild stenosis. Aortic valve area by VTI is 1.8 cm2. Aortic valve peak velocity is 1.8 m/s. Mean gradient is 7 mmHg. The dimensionless index is 0.65. There is moderate aortic regurgitation.    Mitral Valve: There is moderate mitral annular calcification present.    Pulmonary Artery: The estimated pulmonary artery systolic pressure is 21 mmHg.    IVC/SVC: Normal venous pressure at 3 mmHg.  - Continue Beta Blocker ACE/ARB and monitor clinical status closely.   - Monitor on telemetry.  - Patient is off CHF pathway.   - Monitor strict Is&Os and daily weights. Place on fluid restriction of 1.5 L.   - Continue to stress to patient importance of self efficacy and  on diet for CHF.    Mixed hyperlipidemia  - Hold statin in setting of elevated LFTs      VTE Risk Mitigation (From admission, onward)           Ordered     enoxaparin injection 40 mg  Daily         01/29/25 1432     IP VTE HIGH RISK PATIENT  Once         01/29/25 1432     Place sequential compression device  Until discontinued         01/28/25 0713                    Discharge Planning   ROYER: 1/31/2025     Code Status: Full Code   Medical Readiness for Discharge Date:              Ninoska Odonnell PA-C  Department of Hospital Medicine   [facility] - Acute Medical Stepdown

## 2025-01-29 NOTE — PT/OT/SLP EVAL
Occupational Therapy   Co-Evaluation  Co-evaluation/treatment performed due to patient's multiple deficits requiring two skilled therapists to appropriately and safely assess patient's strength and endurance while facilitating functional tasks in addition to accommodating for patient's activity tolerance.        Name: Daina Ngo  MRN: [id number]  Admitting Diagnosis: Open wound of right lower extremity with complication  Recent Surgery: * No surgery found *      Recommendations:     Discharge Recommendations: High Intensity Therapy  Discharge Equipment Recommendations:   (TBD)  Barriers to discharge:  Decreased caregiver support, Other (Comment) (Increased skilled assistance required)    Assessment:     Daina Ngo is a 65 y.o. female with a medical diagnosis of Open wound of right lower extremity with complication.  She presents with the following performance deficits affecting function: weakness, impaired self care skills, impaired functional mobility, impaired balance, impaired endurance, decreased coordination, pain, decreased safety awareness, decreased lower extremity function, decreased upper extremity function, impaired fine motor, impaired cognition.      Pt agreeable to therapy but was significantly limited due to lethargy. However, towards the end OT evaluation, pt become more alert and was able to answer questions & follow verbal commands. Nevertheless, pt remains limited in ADLs, functional mobility, and functional transfers and is currently not performing tasks at [facility]. Pt would continue to benefit from skilled OT services to maximize functional independence with ADLs and functional mobility, reduce caregiver burden, and facilitate safe discharge in the least restrictive environment.      Rehab Prognosis: Good; patient would benefit from acute skilled OT services to address these deficits and reach maximum level of function.       Plan:     Patient to be seen 4 x/week to address the  "above listed problems via self-care/home management, therapeutic activities, therapeutic exercises, neuromuscular re-education  Plan of Care Expires: 03/01/25  Plan of Care Reviewed with: patient    Subjective     Chief Complaint: LB/LE pain  Patient/Family Comments/goals: "I want to get better"    Occupational Profile:  Living Environment: pt lives alone in a  double home with ramp access. Bathroom:tub/shower  Previous level of function: Per pt's daughter report, pt was Mod I  Roles and Routines: Prior to fall in November, pt was cleaning, driving, and doing everything all on her own.   Equipment Used at Home: prosthesis, wheelchair, slide board, shower chair  Assistance upon Discharge: family & home health aide (maybe 4/wk per daughter report)    Pain/Comfort:  Pain Rating 1:  (did not rate)  Location - Orientation 1: generalized  Location 1:  (lower body)  Pain Addressed 1: Reposition, Distraction, Cessation of Activity    Patients cultural, spiritual, Jewish conflicts given the current situation: no    Objective:     Communicated with: nurse prior to session.  Patient found HOB elevated with telemetry upon OT entry to room.    General Precautions: Standard, fall  Orthopedic Precautions: N/A  Braces: N/A  Respiratory Status: Room air    Occupational Performance:    Bed Mobility:    Patient completed Scooting to EOB with total assistance and 2 persons    Patient completed Supine to Sit with total assistance and 2 persons    Patient completed Sit to Supine with total assistance and 2 persons    Functional Mobility/Transfers:  Pt unable to transfer due to extreme lethargy   Pt does not ambulate 2/2 B AKA  Pt's daughter reported that pt owns LE prosthesis but does not use them    Activities of Daily Living:  Unable at this time due to decreased alertness & inability to follow verbal commands    Cognitive/Visual Perceptual:  Cognitive/Psychosocial Skills:     -       Oriented to: Person and Place   -       " Follows Commands/attention:Inattentive  -       Mood/Affect/Coping skills/emotional control: Lethargic    Physical Exam:  Dominant hand: -       Right  Upper Extremity Range of Motion:     -       Right Upper Extremity: WFL  -       Left Upper Extremity: WFL  Upper Extremity Strength:    -       Right Upper Extremity: not WFL. Will require more strength for bed mobility and transfers  -       Left Upper Extremity: not WFL. Will require more strength for bed mobility and transfers   Strength:    -       Right Upper Extremity: WFL  -       Left Upper Extremity: WFL  Fine Motor Coordination:    -       Impaired  Left hand thumb/finger opposition skills   and Right hand thumb/finger opposition skills    Gross motor coordination:   WFL    AMPAC 6 Click ADL:  AMPAC Total Score: 12    Treatment & Education:  -Education on task modification to maximize safety and (I) during bed mobility/transfers  -Education on importance of OOB activity to maintain/improve overall strength, activity tolerance, promote recovery & improve quality of life  -Education on importance of bed positioning to allow healing & lower risk of new pressure ulcers  -Pt educated to call for assistance and to transfer with hospital staff only  -Provided pt & family education regarding role of OT & POC, & discharge recommendations with pt verbalizing understanding. Pt had no further questions & when asked whether there were any concerns pt reported none.     Patient left up in chair with all lines intact, call button in reach, bed alarm on, and daughter present    GOALS:   Multidisciplinary Problems       Occupational Therapy Goals          Problem: Occupational Therapy    Goal Priority Disciplines Outcome Interventions   Occupational Therapy Goal     OT, PT/OT Progressing    Description: Goals to be met by: 03/01/2025     Patient will increase functional independence with ADLs by performing:    UE Dressing with Modified [place].  LE Dressing with  Modified [place].  Grooming while bedside chair with Set-up Assistance.  Toileting from bedside commode with Modified [place] for hygiene and clothing management.   Rolling to Right, Left with Modified [place].   Supine to sit with Modified [place].  Toilet transfer with slide board to bedside commode with Modified [place].                         History:     Past Medical History:   Diagnosis Date    PAD (peripheral artery disease)          Past Surgical History:   Procedure Laterality Date    LEG AMPUTATION         Time Tracking:     OT Date of Treatment: 01/29/25  OT Start Time: 1314  OT Stop Time: 1348  OT Total Time (min): 34 min    Billable Minutes:Evaluation 20  Self Care/Home Management 14    1/29/2025

## 2025-01-29 NOTE — ASSESSMENT & PLAN NOTE
Malnutrition Type:  Context: social/environmental circumstances  Level: moderate    Related to (etiology):   Inadequate energy intake     Signs and Symptoms (as evidenced by):   Debility   Decreased appetite  Non-healing wounds    Unable to safely care for herself    Malnutrition Characteristic Summary:  Weight Loss (Malnutrition): greater than 5% in 1 month (Noted 28 lb weight loss in 1 month)  Subcutaneous Fat (Malnutrition): moderate depletion  Muscle Mass (Malnutrition): moderate depletion    Interventions:  Collaboration by nutrition professional with other providers   Commercial beverage medical food supplement therapy- Boost Plus daily    Nutrition Diagnosis Status:   New

## 2025-01-29 NOTE — PLAN OF CARE
Problem: Occupational Therapy  Goal: Occupational Therapy Goal  Description: Goals to be met by: 03/01/2025     Patient will increase functional independence with ADLs by performing:    UE Dressing with Modified [place].  LE Dressing with Modified [place].  Grooming while bedside chair with Set-up Assistance.  Toileting from bedside commode with Modified [place] for hygiene and clothing management.   Rolling to Right, Left with Modified [place].   Supine to sit with Modified [place].  Toilet transfer with slide board to bedside commode with Modified [place].    Outcome: Progressing     OT eval complete & goals updated.

## 2025-01-29 NOTE — CONSULTS
PharmD Consult Received for: Medication Reconciliation    Medication reconciliation was completed by pharmacy med rec tech. Please see Pharmacy note titled Pharmacy Med Rec for details and reach out with questions. Medication list was updated by technician.     Thanks,    Chris Madden, PharmD  Ext: [phone]

## 2025-01-29 NOTE — PLAN OF CARE
Problem: Physical Therapy  Goal: Physical Therapy Goal  Description: Goals to be met by: 2025     Patient will increase functional independence with mobility by performin. Supine to sit with Modified [place]  2. Sit to supine with Modified [place]  3. Bed to chair transfer with Stand-by Assistance using Slideboard or via anterior/posterior scoot technique.   4. Wheelchair propulsion x150 feet with Modified [place] using bilateral uppper extremities    Outcome: Progressing     PT evaluation completed, POC stablished

## 2025-01-29 NOTE — PLAN OF CARE
Patient AA&O x4. VSS. Spo2 wnl on RA. C/o pain. PRN Oxy 10 given. Wet to dry wound care done. IV abx. Lokelma given for low potassium. Instructed to call staff for assistance. No known needs at this time. Bed is locked and low. Call light is within reach.     Problem: Skin Injury Risk Increased  Goal: Skin Health and Integrity  Outcome: Progressing     Problem: Adult Inpatient Plan of Care  Goal: Plan of Care Review  Outcome: Progressing  Goal: Patient-Specific Goal (Individualized)  Outcome: Progressing  Goal: Absence of Hospital-Acquired Illness or Injury  Outcome: Progressing  Goal: Optimal Comfort and Wellbeing  Outcome: Progressing  Goal: Readiness for Transition of Care  Outcome: Progressing     Problem: Infection  Goal: Absence of Infection Signs and Symptoms  Outcome: Progressing     Problem: Wound  Goal: Optimal Coping  Outcome: Progressing  Goal: Optimal Functional Ability  Outcome: Progressing  Goal: Absence of Infection Signs and Symptoms  Outcome: Progressing  Goal: Improved Oral Intake  Outcome: Progressing  Goal: Optimal Pain Control and Function  Outcome: Progressing  Goal: Skin Health and Integrity  Outcome: Progressing  Goal: Optimal Wound Healing  Outcome: Progressing

## 2025-01-29 NOTE — ASSESSMENT & PLAN NOTE
Patient with acute on chronic debility due to recent bed confinement status from hospitalization and worsening pain to multiple decubitus & vascular wounds. The patient's latest AMPAC (Activity Measure for Post Acute Care) Score is listed below.    AM-PAC Score - How much help does the patient need for each activity listed       Plan  - Progressive mobility protocol initated  - PT/OT consulted & recommending high intensity therapy  - PM&R consulted   - Fall precautions in place

## 2025-01-29 NOTE — SUBJECTIVE & OBJECTIVE
Interval History: Pt seen and examined by me this morning. PANKAJ SAINI. Pt lethargic but wakes to voice & ia oriented x3; per chart review, pt received oxycodone 10 mg shortly before encounter. PRN pain regimen adjusted. No new complaints this morning.     Review of Systems   Constitutional:  Positive for activity change. Negative for chills and fever.   HENT:  Negative for trouble swallowing.    Eyes:  Negative for photophobia and visual disturbance.   Respiratory:  Negative for cough, chest tightness and shortness of breath.    Cardiovascular:  Negative for chest pain, palpitations and leg swelling.   Gastrointestinal:  Positive for constipation (2/2 opioid use). Negative for abdominal pain, diarrhea, nausea and vomiting.   Genitourinary:  Positive for difficulty urinating (suprapubic cath in place). Negative for hematuria and pelvic pain.   Musculoskeletal:  Positive for arthralgias and gait problem. Negative for back pain and neck pain.   Skin:  Positive for color change and wound.   Neurological:  Negative for dizziness, syncope, speech difficulty and light-headedness.   Psychiatric/Behavioral:  Positive for confusion (intermittent since recent hospitalization per family). Negative for agitation. The patient is not nervous/anxious.      Objective:     Vital Signs (Most Recent):  Temp: 98.3 °F (36.8 °C) (01/29/25 1125)  Pulse: 65 (01/29/25 1125)  Resp: 18 (01/29/25 1125)  BP: 134/68 (01/29/25 1125)  SpO2: 96 % (01/29/25 1125) Vital Signs (24h Range):  Temp:  [97.3 °F (36.3 °C)-98.6 °F (37 °C)] 98.3 °F (36.8 °C)  Pulse:  [65-83] 65  Resp:  [16-20] 18  SpO2:  [96 %-100 %] 96 %  BP: (116-145)/(59-90) 134/68     Weight: 41.3 kg (91 lb)  Body mass index is 17.77 kg/m².  No intake or output data in the 24 hours ending 01/29/25 1408      Physical Exam  Vitals and nursing note reviewed.   Constitutional:       General: She is not in acute distress.     Appearance: She is underweight. She is ill-appearing (chronically).    HENT:      Head: Normocephalic and atraumatic.      Mouth/Throat:      Mouth: Mucous membranes are dry.   Eyes:      Extraocular Movements: Extraocular movements intact.      Conjunctiva/sclera: Conjunctivae normal.   Cardiovascular:      Rate and Rhythm: Normal rate and regular rhythm.      Heart sounds: Normal heart sounds.   Pulmonary:      Effort: Pulmonary effort is normal. No respiratory distress.      Breath sounds: Normal breath sounds.   Abdominal:      General: Bowel sounds are normal. There is no distension.      Palpations: Abdomen is soft.      Tenderness: There is no abdominal tenderness.      Comments: Suprapubic catheter in place draining shae colored urine   Musculoskeletal:         General: Tenderness and deformity present. Normal range of motion.      Cervical back: Normal range of motion and neck supple.      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      Left Lower Extremity: Left leg is amputated above knee.   Skin:     General: Skin is warm and dry.      Capillary Refill: Capillary refill takes less than 2 seconds.      Findings: Erythema and wound present.      Comments: Open wound to R medial thigh   L AKA stump with dry eschar present  Open R hip and sacral wound   See media   Neurological:      General: No focal deficit present.      Mental Status: She is oriented to person, place, and time and easily aroused. She is lethargic.      Motor: Weakness (generalized) present.   Psychiatric:         Behavior: Behavior normal.         Thought Content: Thought content normal.         Judgment: Judgment normal.             Significant Labs: All pertinent labs within the past 24 hours have been reviewed.    Significant Imaging: I have reviewed all pertinent imaging results/findings within the past 24 hours.

## 2025-01-29 NOTE — PLAN OF CARE
[facility] - Acute Medical Stepdown  Initial Discharge Assessment       Primary Care Provider: No, Primary Doctor    Admission Diagnosis: Hypokalemia [E87.6]  CHF (congestive heart failure) [I50.9]  Wound infection [T14.8XXA, L08.9]  Chest pain [R07.9]  Pressure injury of skin, unspecified injury stage, unspecified location [L89.90]    Admission Date: 1/27/2025  Expected Discharge Date: 1/31/2025    Transition of Care Barriers: None    Payor: [employer] / Plan: [facility] CHOICE PLUS / Product Type: Commercial /     Extended Emergency Contact Information  Primary Emergency Contact: Mg Ngo  Mobile Phone: [phone]  Relation: Daughter    Discharge Plan A: Rehab  Discharge Plan B: Home with family      [facility] - [place], [place] - [address]  [address]  [facility] [address]  Phone: [phone] Fax: [phone]    [id number] - [place], [place] - [address]  [address]  [place] [address]  Phone: [phone] Fax: [phone]      Initial Assessment (most recent)       Adult Discharge Assessment - 01/29/25 1536          Discharge Assessment    Assessment Type Discharge Planning Assessment     Confirmed/corrected address, phone number and insurance Yes     Confirmed Demographics Correct on Facesheet     Source of Information family     Does patient/caregiver understand observation status Yes     Communicated ROYER with patient/caregiver Yes     Reason For Admission Debility     People in Home child(clemente), adult     Do you expect to return to your current living situation? No     Walking or Climbing Stairs Difficulty yes     Walking or Climbing Stairs ambulation difficulty, requires equipment     Mobility Management WC     Dressing/Bathing Difficulty no     Home Accessibility wheelchair accessible     Equipment Currently Used at Home wheelchair     Readmission within 30 days? No     Patient currently being followed by outpatient case management? No      Do you take prescription medications? Yes     Do you have prescription coverage? Yes     Coverage [facility] CHOICE PLUS -     Do you have any problems affording any of your prescribed medications? No     Is the patient taking medications as prescribed? yes     Who is going to help you get home at discharge? public transportation     How do you get to doctors appointments? public transportation     Are you on dialysis? No     Do you take coumadin? No     Discharge Plan A Rehab     Discharge Plan B Home with family     DME Needed Upon Discharge  other (see comments)     Discharge Plan discussed with: Adult children     Transition of Care Barriers None        Physical Activity    On average, how many days per week do you engage in moderate to strenuous exercise (like a brisk walk)? Patient declined     On average, how many minutes do you engage in exercise at this level? Patient declined        Financial Resource Strain    How hard is it for you to pay for the very basics like food, housing, medical care, and heating? Not very hard        Housing Stability    In the last 12 months, was there a time when you were not able to pay the mortgage or rent on time? No     At any time in the past 12 months, were you homeless or living in a shelter (including now)? No        Transportation Needs    Has the lack of transportation kept you from medical appointments, meetings, work or from getting things needed for daily living? No        Food Insecurity    Within the past 12 months, you worried that your food would run out before you got the money to buy more. Never true     Within the past 12 months, the food you bought just didn't last and you didn't have money to get more. Never true        Stress    Do you feel stress - tense, restless, nervous, or anxious, or unable to sleep at night because your mind is troubled all the time - these days? Not at all        Social Isolation    How often do you feel lonely or  isolated from those around you?  Never        Alcohol Use    Q1: How often do you have a drink containing alcohol? Patient declined     Q2: How many drinks containing alcohol do you have on a typical day when you are drinking? Patient declined     Q3: How often do you have six or more drinks on one occasion? Patient declined        Utilities    In the past 12 months has the electric, gas, oil, or water company threatened to shut off services in your home? Patient declined                      SW spoke with pt daughter  Mg  to discuss discharge planning.  Pt lives with family and uses a WC for assistance with ambulation and ADLs.  No dialysis.  Pt will have transportation and assistance from public transportation.  at discharge.  Discharge Plan A and Plan B have been determined by review of patient's clinical status, future medical and therapeutic needs, and coverage/benefits for post-acute care in coordination with multidisciplinary team members.        SW provided family list of rehab facilities and sent out referrals through [facility] [place].   Will follow up.     Khushboo Diaz MSW, CSW

## 2025-01-29 NOTE — PT/OT/SLP EVAL
Physical Therapy Co-Evaluation and Treatment    Co-evaluation/treatment performed due to patient's multiple deficits requiring two skilled therapists to appropriately and safely assess patient's strength and endurance while facilitating functional tasks in addition to accommodating for patient's activity tolerance.      Patient Name:  Daina Ngo   MRN:  [id number]  Admit Date: 1/27/2025  Admitting Diagnosis:  Debility   Length of Stay: 1 days  Recent Surgery: * No surgery found *      Recommendations:     Discharge Recommendations:  High Intensity Therapy  Discharge Equipment Recommendations: to be determined by next level of care   Barriers to discharge: increased physical assistance needed, decreased caregiver support    Assessment:     Daina Ngo is a 65 y.o. female admitted with a medical diagnosis of Debility.  She presents with the following impairments/functional limitations: weakness, impaired endurance, impaired self care skills, impaired functional mobility, impaired balance, impaired cognition, decreased upper extremity function, decreased lower extremity function, pain, decreased safety awareness.     Pt very lethargic during most of evaluation today however with some increased alertness towards the end. Daughter present who assist to provide information on PLOF. Per daughter, pt experienced a fall out of her w/c in November and since then has had a steady functional decline. Prior to November, pt was independent with bed<>w/c transfers, w/c mobility around home and even driving car with hand controls. Today, pt required totalA for supine<>sit and maxA for sitting balance EOB. As alertness improved towards the end of session she demonstrated some improvements in command following. Pt is functioning well below her baseline and lives alone. Pt could benefit from high intensity rehab as well as to be seen 4x/wk during hospital stay.     Recommend high intensity therapy following discharge  "once medically stable in order to reduce fall risk, reduce caregiver burden, improve quality of life, and return to PLOF.  Patient presents with good participation, motivation, and family support to return to prior level of function and demonstrates appropriate endurance, strength, pain control, and fine motor control to participate in up to 3 hours daily or 15hrs weekly of multidisciplinary intensive therapy. Pt would continue to benefit from skilled acute PT in order to address current deficits and progress functional mobility.      Rehab Prognosis: Good; patient would benefit from acute skilled PT services to address these deficits and reach maximum level of function.      Treatment Tolerated: Fairly Well    Highest level of mobility achieved this visit: maxA for sitting EOB    Activity with RN/PCT: bed mobility only with one person assist    Plan:     During this hospitalization, patient to be seen 4 x/week to address the identified rehab impairments via therapeutic activities, therapeutic exercises, wheelchair management/training, neuromuscular re-education and progress towards the established goals.    Plan of Care Expires:  02/28/25    Subjective     RN notified prior to session. Pt's daughter present upon arrival to room.     Chief Complaint: "I hurt all over"  Patient/Family Comments/goals: "you all are angels"  Pain/Comfort:  Pain Rating 1: other (see comments) (did not rate)  Location - Orientation 1: generalized  Pain Addressed 1: Reposition, Distraction    Social History:  Residence: lives alone 1-story house with ramped entrance.   Support available: family can provide intermittent assist but pt will be alone for periods during the day  Equipment Used: wheelchair, slide board, other (see comments), power chair  Equipment Owned (not using): does not typically use slide board  Prior level of function: independent with transfers and w/c mobility, was driving car with hand controls. Has prosthetic legs but " does not use them.   Work: no  Drive: yes.       Objective:     Additional staff present: [place] (OT)    Patient found supine with: telemetry     General Precautions: Standard, fall   Orthopedic Precautions:N/A   Braces: N/A   Body mass index is 17.78 kg/m².  Oxygen Device: Room Air      Vitals: BP (!) 111/56 (Patient Position: Lying)   Pulse 72   Temp 98.1 °F (36.7 °C) (Oral)   Resp 18   Ht 5' (1.524 m)   Wt 41.3 kg (91 lb 0.8 oz)   SpO2 95%   Breastfeeding No   BMI 17.78 kg/m²     Exams:  Cognition:   Oriented X 1  Command following: pt follows commands ~20% of time, very lethargic today  Fluency: low volume  Hearing: Intact  Vision:  Intact  Skin Integrity: R stump and buttocks wounds    Physical Exam:   Edema - None noted  ROM - bilateral hip ROM WFL  Strength - active hip flexion and hip abduction 3/5 in supine (difficult to test due to poor command following)   Sensation - Intact to light touch  Coordination - No deficits noted    Outcome Measures:    AM-PAC 6 CLICK MOBILITY  Turning over in bed (including adjusting bedclothes, sheets and blankets)?: 2  Sitting down on and standing up from a chair with arms (e.g., wheelchair, bedside commode, etc.): 1  Moving from lying on back to sitting on the side of the bed?: 2  Moving to and from a bed to a chair (including a wheelchair)?: 1  Need to walk in hospital room?: 1  Climbing 3-5 steps with a railing?: 1  Basic Mobility Total Score: 8     Functional Mobility:    Bed Mobility:   Supine to Sit: total assistance;   Sit to Supine: total assistance;     Sitting Balance at Edge of Bed:  Assistance Level Required: Maximum Assistance  Comments: posterior lean    Transfers:   Not assessed due to lethargy       Education:  PT role in POC to address current functional deficits  Pt educated on proper body mechanics, safety techniques, and energy conservation with PT facilitation and cueing throughout session  Whiteboard updated with therapist name and pt's current  mobility status documented above    Patient left HOB elevated with all lines intact and call button in reach.    GOALS:   Multidisciplinary Problems       Physical Therapy Goals          Problem: Physical Therapy    Goal Priority Disciplines Outcome Interventions   Physical Therapy Goal     PT, PT/OT Progressing    Description: Goals to be met by: 2025     Patient will increase functional independence with mobility by performin. Supine to sit with Modified [place]  2. Sit to supine with Modified [place]  3. Bed to chair transfer with Stand-by Assistance using Slideboard or via anterior/posterior scoot technique.   4. Wheelchair propulsion x150 feet with Modified [place] using bilateral uppper extremities                         History:     Past Medical History:   Diagnosis Date    PAD (peripheral artery disease)        Past Surgical History:   Procedure Laterality Date    LEG AMPUTATION         Time Tracking:     PT Received On: 25  PT Start Time: 1310     PT Stop Time: 1349  PT Total Time (min): 39 min     Billable Minutes: Evaluation 1 procedure and Therapeutic Activity 25 min     Mago Bardales PT, DPT  2025

## 2025-01-29 NOTE — ASSESSMENT & PLAN NOTE
Pressure injury of the sacral region  Pressure ulcer involving back and right hip   Peripheral vascular disease s/p bilateral AKA  - 65 y.o. F with severe vascular disease presenting to the ED for worsening of vascular and decubitus wounds despite compliance with outpatient antibiotics and wound care as described above  - 2/4 SIRS on admission for tachycardia > 90 and leukocytosis to 14, now resolved   - CTA run-off c/w severe vascular disease and multiple occlusions as described above  - Poor wound healing likely 2/2 severe vascular disease and malnutrition vs. ongoing infection   - Vascular surgery consulted & recommended local wound care  - Given vancomycin & zosyn in the ED  - ID consulted, appreciate recs:   D/c Zosyn  Continue vancomycin (pharmacy to dose) and start Ceftriaxone 2 g q24 hrs IV until blood cultures result negative x 48 hrs.   If negative, can transition to oral Doxycycline 100 mg PO BID and Augmentin 875/125 mg PO BID x 14 days  - Wound care consulted, appreciate recs   - Adjust pain regimen to include scheduled NSAIDs & robaxin given adequate renal function, utilize opioids minimally given lethargy on oxycodone 10 mg

## 2025-01-29 NOTE — ASSESSMENT & PLAN NOTE
Leriche syndrome  Femoral artery occlusion  SMA stenosis     - CTA run-off with complete occlusion of the infrarenal abdominal aorta and iliac arteries, compatible with aortoiliac occlusive disease (Leriche syndrome); Occluded common femoral and superficial femoral arteries bilaterally, with trace collateral opacification of the bilateral deep femoral arteries to each distal AKA stump; patent but critically stenosed proximal SMA  - Vascular surgery consulted & unfortunately no revascularization options are available   - Continue ASA and plavix   - Smoking cessation & secondary prevention discussed at length

## 2025-01-29 NOTE — CONSULTS
[facility] - Acute Medical Stepdown  Adult Nutrition  Progress Note    SUMMARY   Recommendations  1. Continue cardiac diet as tolerated   2. Add Boost Plus daily   3. Monitor weight and labs   4. Encourage PO intake  5. Add Ke BID   6. Monitor need for nutrition support   7. Add Vitamin C, zinc, and MVI to promote wound healing     Goals: Pt will meet 85%EEN/EPN by RD follow up    Nutrition Goal Status: new  Communication of RD Recs:  (POC)    Assessment and Plan    Endocrine  Moderate protein-calorie malnutrition  Malnutrition Type:  Context: social/environmental circumstances  Level: moderate    Related to (etiology):   Inadequate energy intake     Signs and Symptoms (as evidenced by):   Debility   Decreased appetite  Non-healing wounds    Unable to safely care for herself    Malnutrition Characteristic Summary:  Weight Loss (Malnutrition): greater than 5% in 1 month (Noted 28 lb weight loss in 1 month)  Subcutaneous Fat (Malnutrition): moderate depletion  Muscle Mass (Malnutrition): moderate depletion    Interventions:  Collaboration by nutrition professional with other providers   [employer] medical food supplement therapy- Boost Plus daily    Nutrition Diagnosis Status:   New       Malnutrition Assessment  Malnutrition Context: social/environmental circumstances  Malnutrition Level: moderate          Weight Loss (Malnutrition): greater than 5% in 1 month (Noted 28 lb weight loss in 1 month)  Subcutaneous Fat (Malnutrition): moderate depletion  Muscle Mass (Malnutrition): moderate depletion   Orbital Region (Subcutaneous Fat Loss): moderate depletion  Upper Arm Region (Subcutaneous Fat Loss): moderate depletion  Thoracic and Lumbar Region: moderate depletion   Baptism Region (Muscle Loss): moderate depletion  Clavicle Bone Region (Muscle Loss): moderate depletion  Clavicle and Acromion Bone Region (Muscle Loss): moderate depletion  Scapular Bone Region (Muscle Loss): moderate depletion  Dorsal Hand  (Muscle Loss): moderate depletion  Patellar Region (Muscle Loss): mild depletion  Anterior Thigh Region (Muscle Loss): mild depletion  Posterior Calf Region (Muscle Loss): mild depletion     Reason for Assessment  Reason For Assessment: consult (Malnutrition vasculopathy w/poor wound healing)  Diagnosis:  (Debility)  General Information Comments: Pt is currently on a cardiac diet. Aime-13/right dorsal greater trochanter, right anterior thigh, left anteiror knee. No meal intake noted. Recent admitted to [facility] for an 8 day stay for infected decubitis ulcer. BL leg amputations. Noted 28 lb weight loss in 1 month. NFPE conducted 25. Pt fell asleep during exam, unable to obtain more information.  Nutrition Discharge Planning: d/c pending clincial course    Nutrition/Diet History  Nutrition Intake History: [facility]  Food Preferences: [facility]  Spiritual, Cultural Beliefs, Restoration Practices, Values that Affect Care: no  Factors Affecting Nutritional Intake: impaired cognitive status/motor control, decreased appetite    Food Insecurity: No Food Insecurity (2025)    Received from [facility]    Hunger Vital Sign     Worried About Running Out of Food in the Last Year: Never true     Ran Out of Food in the Last Year: Never true     Anthropometrics  Height: 5' (152.4 cm)  Height (inches): 60 in  Weight: 41.3 kg (91 lb 0.8 oz)  Weight (lb): 91.05 lb  Weight Method: Stated  Ideal Body Weight (IBW), Female: 100 lb  % Ideal Body Weight, Female (lb): 91.05 %  BMI (Calculated): 17.8  BMI Grade: less than 18.5 - underweight  Usual Body Weight (UBW), k.4 kg (24)  % Usual Body Weight: 76.08  % Weight Change From Usual Weight: -24.08 %  Amputation %: 5.9, 5.9  Total Amputation %: 11.8  Amputation Ideal Body Weight (IBW), Female (lb): 88.2 lb  Amputee BMI (kg/m2): 20.22 kg/m2     Lab/Procedures/Meds  Pertinent Labs Reviewed: reviewed  Pertinent Labs Comments: Na 130, Ca 7.8,   Pertinent Medications Reviewed:  reviewed  Pertinent Medications Comments: aspirin, atorvastatin, calcium carbonate, gabapentin, losartan, nicotine, vancomycin    Estimated/Assessed Needs  Weight Used For Calorie Calculations: 41.3 kg (91 lb 0.8 oz)  Energy Calorie Requirements (kcal): 1457 kcals (40 kcals.kg (-11.8% BL leg amputations))  Energy Need Method: Kcal/kg  Protein Requirements: 62g (1.5g/kg)  Weight Used For Protein Calculations: 41.3 kg (91 lb 0.8 oz)     Estimated Fluid Requirement Method: RDA Method  RDA Method (mL): 1457     Nutrition Prescription Ordered  Current Diet Order: Cardiac Diet    Evaluation of Received Nutrient/Fluid Intake  I/O: 221/700  Energy Calories Required: not meeting needs  Protein Required: not meeting needs  Fluid Required: not meeting needs  Comments: LBM-1/27/25  Tolerance: tolerating  % Intake of Estimated Energy Needs: Other: No meal intake noted  % Meal Intake: Other: No meal intake noted    Nutrition Risk  Level of Risk/Frequency of Follow-up: high (2x/week)     Monitor and Evaluation  Food and Nutrient Intake: energy intake, food and beverage intake  Food and Nutrient Adminstration: diet order  Anthropometric Measurements: body mass index, weight change  Biochemical Data, Medical Tests and Procedures: electrolyte and renal panel, gastrointestinal profile, inflammatory profile, lipid profile  Nutrition-Focused Physical Findings: overall appearance     Nutrition Follow-Up  RD Follow-up?: Yes

## 2025-01-29 NOTE — ASSESSMENT & PLAN NOTE
Patient's blood pressure range in the last 24 hours was: BP  Min: 116/62  Max: 145/69.The patient's inpatient anti-hypertensive regimen is listed below:  Current Antihypertensives  chlorthalidone tablet 25 mg, Daily, Oral  losartan tablet 50 mg, Daily, Oral  metoprolol succinate (TOPROL-XL) 24 hr tablet 50 mg, Daily, Oral    Plan  - Hold home chlorthalidone for now given low normal BP - restart when medically indicated

## 2025-01-29 NOTE — ASSESSMENT & PLAN NOTE
Resolved  Patient's most recent potassium results are listed below.   Recent Labs     01/28/25  1538 01/29/25  0018 01/29/25  0411   K 2.4* 5.4* 5.0     Plan  - Replete potassium per protocol  - Monitor potassium Daily  - Patient's hypokalemia is stable

## 2025-01-29 NOTE — PROGRESS NOTES
Pharmacokinetic Assessment Follow Up: IV Vancomycin    Vancomycin serum concentration assessment(s):    The trough level was drawn correctly and can be used to guide therapy at this time. The measurement is below the desired definitive target range of 10 to 20 mcg/mL.    Vancomycin Regimen Plan:    Change regimen to Vancomycin 750 mg IV every 12 hours with next serum trough concentration measured at 0430 on 01/31/2025.    Drug levels (last 3 results):  Recent Labs   Lab Result Units 01/29/25  1445   Vancomycin-Trough ug/mL 8.5*       Pharmacy will continue to follow and monitor vancomycin.    Please contact pharmacy at extension [phone] for questions regarding this assessment.    Thank you for the consult,   Abby Wallace       Patient brief summary:  Daina Ngo is a 65 y.o. female initiated on antimicrobial therapy with IV Vancomycin for treatment of skin & soft tissue infection    The patient's current regimen is Vancomycin 500 mg every 12 hours.    Drug Allergies:   Review of patient's allergies indicates:  No Known Allergies    Actual Body Weight:   41.3 kg    Renal Function:   Estimated Creatinine Clearance: 73.1 mL/min (based on SCr of 0.5 mg/dL).,     Dialysis Method (if applicable):  N/A    CBC (last 72 hours):  Recent Labs   Lab Result Units 01/27/25 2139 01/28/25  1324 01/29/25  0411   WBC K/uL 14.08* 12.46 9.51   Hemoglobin g/dL 13.0 11.3* 10.9*   Hematocrit % 38.6 33.6* 33.1*   Platelets K/uL 217 189 168   Gran % % 74.4* 79.0*  --    Lymph % % 19.2 13.9*  --    Mono % % 6.0 6.3  --    Eosinophil % % 0.0 0.1  --    Basophil % % 0.1 0.1  --    Differential Method  Automated Automated  --        Metabolic Panel (last 72 hours):  Recent Labs   Lab Result Units 01/27/25 2139 01/27/25  2228 01/28/25  1538 01/29/25  0018 01/29/25  0411   Sodium mmol/L 135*  --  131* 130* 130*   Potassium mmol/L 2.8*  --  2.4* 5.4* 5.0   Chloride mmol/L 94*  --  96 100 102   CO2 mmol/L 27  --  23 24 24   Glucose  mg/dL 118*  --  375* 111* 91   Glucose, UA   --  Negative  --   --   --    BUN mg/dL 23  --  15 15 15   Creatinine mg/dL 0.6  --  0.6 0.5 0.5   Albumin g/dL 2.5*  --  1.8* 2.1* 2.1*   Total Bilirubin mg/dL 1.2*  --  1.0 1.2* 1.1*   Alkaline Phosphatase U/L 88  --  59 89 83   AST U/L 197*  --  135* 175* 178*   ALT U/L 59*  --  43 58* 59*   Magnesium mg/dL  --   --   --  2.7*  --        Vancomycin Administrations:  vancomycin given in the last 96 hours                     vancomycin 750 mg in 0.9% NaCl 250 mL IVPB (admixture device) (mg) 750 mg New Bag 01/29/25 1744    vancomycin (VANCOCIN) 500 mg in D5W 100 mL IVPB (MB+) (mg) 500 mg New Bag 01/29/25 0132     500 mg New Bag 01/28/25 1346    vancomycin 750 mg in 0.9% NaCl 250 mL IVPB (admixture device) (mg) 750 mg New Bag 01/28/25 0648                    Microbiologic Results:  Microbiology Results (last 7 days)       Procedure Component Value Units Date/Time    Blood culture #2 **CANNOT BE ORDERED STAT** [[id number]] Collected: 01/27/25 2158    Order Status: Completed Specimen: Blood from Peripheral, Antecubital, Right Updated: 01/29/25 0613     Blood Culture, Routine No Growth to date      No Growth to date    Blood culture #1 **CANNOT BE ORDERED STAT** [[id number]] Collected: 01/27/25 2158    Order Status: Completed Specimen: Blood from Peripheral, Forearm, Left Updated: 01/29/25 0613     Blood Culture, Routine No Growth to date      No Growth to date    Urine culture [[id number]] Collected: 01/27/25 2228    Order Status: Completed Specimen: Urine Updated: 01/29/25 0524     Urine Culture, Routine No growth    Narrative:      Specimen Source->Urine

## 2025-01-29 NOTE — PLAN OF CARE
Recommendations  1. Continue cardiac diet as tolerated   2. Add Boost Plus daily   3. Monitor weight and labs   4. Encourage PO intake  5. Add Ke BID   6. Monitor need for nutrition support   7. Add Vitamin C, zinc, and MVI to promote wound healing     Goals: Pt will meet 85%EEN/EPN by RD follow up    Nutrition Goal Status: new  Communication of RD Recs:  (POC)    Assessment and Plan    Endocrine  Moderate protein-calorie malnutrition  Malnutrition Type:  Context: social/environmental circumstances  Level: moderate    Related to (etiology):   Inadequate energy intake     Signs and Symptoms (as evidenced by):   Debility   Decreased appetite  Non-healing wounds    Unable to safely care for herself    Malnutrition Characteristic Summary:  Weight Loss (Malnutrition): greater than 5% in 1 month (Noted 28 lb weight loss in 1 month)  Subcutaneous Fat (Malnutrition): moderate depletion  Muscle Mass (Malnutrition): moderate depletion    Interventions:  Collaboration by nutrition professional with other providers   [employer] medical food supplement therapy- Boost Plus daily    Nutrition Diagnosis Status:   New

## 2025-01-29 NOTE — PLAN OF CARE
Patient joycelyno christian's 4. Daughter at bedside. Dietician saw the patient and diet advanced to cardiac diet. Wound care came and saw patient, patient changed to immerse mattress. Patient cry in  pain when being turned patient's daughter stated that the patient's pelvis has broken fragments from a fall at home. Split gauze placed on suprapubic site, catheter draining to g/u bag. Safety precautions maintained, call light in reach, and plan of care ongoing.                Problem: Skin Injury Risk Increased  Goal: Skin Health and Integrity  Outcome: Progressing     Problem: Adult Inpatient Plan of Care  Goal: Plan of Care Review  Outcome: Progressing  Goal: Patient-Specific Goal (Individualized)  Outcome: Progressing  Goal: Absence of Hospital-Acquired Illness or Injury  Outcome: Progressing  Goal: Optimal Comfort and Wellbeing  Outcome: Progressing  Goal: Readiness for Transition of Care  Outcome: Progressing     Problem: Infection  Goal: Absence of Infection Signs and Symptoms  Outcome: Progressing     Problem: Wound  Goal: Optimal Coping  Outcome: Progressing  Goal: Optimal Functional Ability  Outcome: Progressing  Goal: Absence of Infection Signs and Symptoms  Outcome: Progressing  Goal: Improved Oral Intake  Outcome: Progressing  Goal: Optimal Pain Control and Function  Outcome: Progressing  Goal: Skin Health and Integrity  Outcome: Progressing  Goal: Optimal Wound Healing  Outcome: Progressing

## 2025-01-29 NOTE — HOSPITAL COURSE
Ms. Ngo was admitted for open wound of the RLE and significant PAD with uncontrolled pain. Vascular surgery consulted at admission and unfortunately no revascularization options are available so local wound care was recommended. Pt was initially started on vancomycin and zosyn and subsequently transitioned to PO doxycycline and augmentin for 14 day course per ID recs. Wound care recs given. General surgery consulted with bedside debridement. PT/OT consulted and recommending high intensity therapy but unfortunately patient without any accepting rehab, LTAC or SNF facilities. Patient is stable for discharge home with HH/wound care with remaining oral augmentin/doxy course. ID referral placed. Discussed care plan with patient and daughter, verbalized understanding. All questions answered. Return precautions given.

## 2025-01-29 NOTE — ASSESSMENT & PLAN NOTE
Patient is identified as having Diastolic (HFpEF) heart failure that is Chronic. CHF is currently controlled.   Results for orders placed during the hospital encounter of 01/27/25    Echo    Interpretation Summary    Left Ventricle: The left ventricle is normal in size. Normal wall thickness. Normal wall motion. There is low normal systolic function with a visually estimated ejection fraction of 50 - 55%. There is normal diastolic function.    Right Ventricle: Normal right ventricular cavity size. Wall thickness is normal. Systolic function is mildly reduced.    Aortic Valve: There is moderate aortic valve sclerosis. There is mild annular calcification present. Mildly restricted motion. There is mild stenosis. Aortic valve area by VTI is 1.8 cm2. Aortic valve peak velocity is 1.8 m/s. Mean gradient is 7 mmHg. The dimensionless index is 0.65. There is moderate aortic regurgitation.    Mitral Valve: There is moderate mitral annular calcification present.    Pulmonary Artery: The estimated pulmonary artery systolic pressure is 21 mmHg.    IVC/SVC: Normal venous pressure at 3 mmHg.  - Continue Beta Blocker ACE/ARB and monitor clinical status closely.   - Monitor on telemetry.  - Patient is off CHF pathway.   - Monitor strict Is&Os and daily weights. Place on fluid restriction of 1.5 L.   - Continue to stress to patient importance of self efficacy and  on diet for CHF.

## 2025-01-30 ENCOUNTER — EPISODE CHANGES (OUTPATIENT)
Dept: CARDIOLOGY | Facility: CLINIC | Age: 65
End: 2025-01-30

## 2025-01-30 PROBLEM — T07.XXXA WOUNDS, MULTIPLE: Chronic | Status: ACTIVE | Noted: 2025-01-30

## 2025-01-30 LAB
ALBUMIN SERPL BCP-MCNC: 1.9 G/DL (ref 3.5–5.2)
ALP SERPL-CCNC: 75 U/L (ref 40–150)
ALT SERPL W/O P-5'-P-CCNC: 50 U/L (ref 10–44)
ANION GAP SERPL CALC-SCNC: 6 MMOL/L (ref 8–16)
AST SERPL-CCNC: 169 U/L (ref 10–40)
BILIRUB SERPL-MCNC: 0.7 MG/DL (ref 0.1–1)
BUN SERPL-MCNC: 20 MG/DL (ref 8–23)
CALCIUM SERPL-MCNC: 8.2 MG/DL (ref 8.7–10.5)
CHLORIDE SERPL-SCNC: 99 MMOL/L (ref 95–110)
CO2 SERPL-SCNC: 24 MMOL/L (ref 23–29)
CREAT SERPL-MCNC: 0.5 MG/DL (ref 0.5–1.4)
ERYTHROCYTE [DISTWIDTH] IN BLOOD BY AUTOMATED COUNT: 14.3 % (ref 11.5–14.5)
EST. GFR  (NO RACE VARIABLE): >60 ML/MIN/1.73 M^2
GLUCOSE SERPL-MCNC: 90 MG/DL (ref 70–110)
HCT VFR BLD AUTO: 33.2 % (ref 37–48.5)
HGB BLD-MCNC: 11 G/DL (ref 12–16)
MCH RBC QN AUTO: 32.1 PG (ref 27–31)
MCHC RBC AUTO-ENTMCNC: 33.1 G/DL (ref 32–36)
MCV RBC AUTO: 97 FL (ref 82–98)
PLATELET # BLD AUTO: 144 K/UL (ref 150–450)
PMV BLD AUTO: 9.8 FL (ref 9.2–12.9)
POCT GLUCOSE: 100 MG/DL (ref 70–110)
POCT GLUCOSE: 107 MG/DL (ref 70–110)
POCT GLUCOSE: 124 MG/DL (ref 70–110)
POCT GLUCOSE: 140 MG/DL (ref 70–110)
POTASSIUM SERPL-SCNC: 4.2 MMOL/L (ref 3.5–5.1)
PROT SERPL-MCNC: 6.2 G/DL (ref 6–8.4)
RBC # BLD AUTO: 3.43 M/UL (ref 4–5.4)
SODIUM SERPL-SCNC: 129 MMOL/L (ref 136–145)
WBC # BLD AUTO: 6.63 K/UL (ref 3.9–12.7)

## 2025-01-30 PROCEDURE — 25000003 PHARM REV CODE 250: Performed by: INTERNAL MEDICINE

## 2025-01-30 PROCEDURE — 36415 COLL VENOUS BLD VENIPUNCTURE: CPT

## 2025-01-30 PROCEDURE — S4991 NICOTINE PATCH NONLEGEND: HCPCS

## 2025-01-30 PROCEDURE — 85027 COMPLETE CBC AUTOMATED: CPT

## 2025-01-30 PROCEDURE — 25000003 PHARM REV CODE 250

## 2025-01-30 PROCEDURE — 20600001 HC STEP DOWN PRIVATE ROOM

## 2025-01-30 PROCEDURE — 80053 COMPREHEN METABOLIC PANEL: CPT

## 2025-01-30 PROCEDURE — 0HDJXZZ EXTRACTION OF LEFT UPPER LEG SKIN, EXTERNAL APPROACH: ICD-10-PCS | Performed by: STUDENT IN AN ORGANIZED HEALTH CARE EDUCATION/TRAINING PROGRAM

## 2025-01-30 PROCEDURE — 63600175 PHARM REV CODE 636 W HCPCS: Performed by: INTERNAL MEDICINE

## 2025-01-30 PROCEDURE — 97112 NEUROMUSCULAR REEDUCATION: CPT

## 2025-01-30 PROCEDURE — 63600175 PHARM REV CODE 636 W HCPCS

## 2025-01-30 PROCEDURE — 97110 THERAPEUTIC EXERCISES: CPT

## 2025-01-30 PROCEDURE — 94761 N-INVAS EAR/PLS OXIMETRY MLT: CPT

## 2025-01-30 RX ADMIN — METHOCARBAMOL 500 MG: 500 TABLET ORAL at 10:01

## 2025-01-30 RX ADMIN — METHOCARBAMOL 500 MG: 500 TABLET ORAL at 03:01

## 2025-01-30 RX ADMIN — AMOXICILLIN AND CLAVULANATE POTASSIUM 1 TABLET: 875; 125 TABLET, FILM COATED ORAL at 08:01

## 2025-01-30 RX ADMIN — ENOXAPARIN SODIUM 30 MG: 30 INJECTION SUBCUTANEOUS at 05:01

## 2025-01-30 RX ADMIN — METOPROLOL SUCCINATE 50 MG: 50 TABLET, EXTENDED RELEASE ORAL at 10:01

## 2025-01-30 RX ADMIN — METHOCARBAMOL 500 MG: 500 TABLET ORAL at 05:01

## 2025-01-30 RX ADMIN — METHOCARBAMOL 500 MG: 500 TABLET ORAL at 08:01

## 2025-01-30 RX ADMIN — CLOPIDOGREL BISULFATE 75 MG: 75 TABLET ORAL at 10:01

## 2025-01-30 RX ADMIN — AMOXICILLIN AND CLAVULANATE POTASSIUM 1 TABLET: 875; 125 TABLET, FILM COATED ORAL at 11:01

## 2025-01-30 RX ADMIN — CALCIUM CARBONATE (ANTACID) CHEW TAB 500 MG 1000 MG: 500 CHEW TAB at 10:01

## 2025-01-30 RX ADMIN — VANCOMYCIN HYDROCHLORIDE 750 MG: 750 INJECTION, POWDER, LYOPHILIZED, FOR SOLUTION INTRAVENOUS at 05:01

## 2025-01-30 RX ADMIN — IBUPROFEN 400 MG: 400 TABLET ORAL at 03:01

## 2025-01-30 RX ADMIN — POLYETHYLENE GLYCOL 3350 17 G: 17 POWDER, FOR SOLUTION ORAL at 10:01

## 2025-01-30 RX ADMIN — GABAPENTIN 400 MG: 400 CAPSULE ORAL at 10:01

## 2025-01-30 RX ADMIN — IBUPROFEN 400 MG: 400 TABLET ORAL at 10:01

## 2025-01-30 RX ADMIN — LOSARTAN POTASSIUM 50 MG: 50 TABLET, FILM COATED ORAL at 10:01

## 2025-01-30 RX ADMIN — DOXYCYCLINE HYCLATE 100 MG: 100 TABLET, COATED ORAL at 08:01

## 2025-01-30 RX ADMIN — GABAPENTIN 400 MG: 400 CAPSULE ORAL at 08:01

## 2025-01-30 RX ADMIN — CALCIUM CARBONATE (ANTACID) CHEW TAB 500 MG 1000 MG: 500 CHEW TAB at 08:01

## 2025-01-30 RX ADMIN — NICOTINE 1 PATCH: 14 PATCH, EXTENDED RELEASE TRANSDERMAL at 10:01

## 2025-01-30 RX ADMIN — IBUPROFEN 400 MG: 400 TABLET ORAL at 08:01

## 2025-01-30 RX ADMIN — DOXYCYCLINE HYCLATE 100 MG: 100 TABLET, COATED ORAL at 10:01

## 2025-01-30 RX ADMIN — GABAPENTIN 400 MG: 400 CAPSULE ORAL at 03:01

## 2025-01-30 RX ADMIN — ASPIRIN 81 MG: 81 TABLET, COATED ORAL at 10:01

## 2025-01-30 NOTE — PROGRESS NOTES
[facility] - [facility] Medicine  Progress Note    Patient Name: Daina Ngo  MRN: [id number]  Patient Class: IP- Inpatient   Admission Date: 1/27/2025  Length of Stay: 2 days  Attending Physician: Chasity Jain MD  Primary Care Provider: Page, Primary Doctor        Subjective     Principal Problem:Debility        HPI:  Daina Ngo is a 65 y.o. F with severe PAD s/p bilateral AKAs, HTN, HLD, aortic occlusion, hepatitis C, nicotine dependence, and depression/anxiety who presented to the ED for evaluation of multiple wounds. Per chart review, pt was recently admitted for 8-day stay at [facility] for infected decubitus ulcers, where she was treated with broad spectrum antibiotics and transitioned to augmentin and doxycycline at discharge (discharged 1/20). During that admission, she was also found to have an almost totally occluded descending abdominal aorta for which no interventions were performed & also had recurrent urinary retention for which urology placed a suprapubic catheter. She was discharged home with home health but now presents to ED for evaluation given progressively worsening wound pain (predominately to the R stump and buttocks) and debility with new inability to safely care for herself at home. She reports compliance with augmentin and doxycycline regimen at home but believes her wounds are not improving and have become more painful since her discharge. Pt denies fever, chills, chest pain, palpitations, SOB, cough, abdominal pain, N/V/D, HA, or syncope.    In the ED: Mildly tachycardic to 100s and hypotensive to 80s/60s which improved with IVFs o/w VSSAF.  CBC with leukocytosis to 14. CMP with K 2.8, , ALT 59. UA with 38 WBCs 2+ leuk esterase, and occasional yeast. CTA run-off with complete occlusion of the infrarenal abdominal aorta and iliac arteries, compatible with aortoiliac occlusive disease (Leriche syndrome); Occluded common femoral and superficial  femoral arteries bilaterally, with trace collateral opacification of the bilateral deep femoral arteries to each distal AKA stump; patent but critically stenosed proximal SMA; and multiple acute vs subacute pelvic/acetabular fractures. Pt was given vancomycin, zosyn, potassium, morphine, dilaudid, and IVFs and was admitted to hospital medicine for further management.     Overview/Hospital Course:  Ms. Ngo was admitted for open wound of the RLE and significant PAD with uncontrolled pain. Vascular surgery consulted at admission and unfortunately no revascularization options are available so local wound care was recommended. Pt was initially started on vancomycin and zosyn and subsequently transitioned to PO doxycycline and augmentin for 14 day course per ID recs. Wound care consulted and following. General surgery consulted with bedside debridement. PT/OT consulted and recommending high intensity therapy. CM following to aid in placement.     Interval History: Pt seen and examined by me this morning. SHARLENE. PARVEEN. No new complaints this morning. General surgery consulted for possible debridement, 1 mm depth debrided from right lateral edge of wound at bedside. No other debrided warranted at this time. Pending placement.     Review of Systems   Constitutional:  Positive for activity change. Negative for chills and fever.   HENT:  Negative for trouble swallowing.    Eyes:  Negative for photophobia and visual disturbance.   Respiratory:  Negative for cough, chest tightness and shortness of breath.    Cardiovascular:  Negative for chest pain, palpitations and leg swelling.   Gastrointestinal:  Positive for constipation (2/2 opioid use). Negative for abdominal pain, diarrhea, nausea and vomiting.   Genitourinary:  Positive for difficulty urinating (suprapubic cath in place). Negative for hematuria and pelvic pain.   Musculoskeletal:  Positive for arthralgias and gait problem. Negative for back pain and neck pain.    Skin:  Positive for color change and wound.   Neurological:  Negative for dizziness, syncope, speech difficulty and light-headedness.   Psychiatric/Behavioral:  Negative for agitation and confusion. The patient is not nervous/anxious.      Objective:     Vital Signs (Most Recent):  Temp: 97.5 °F (36.4 °C) (01/30/25 1229)  Pulse: 76 (01/30/25 1229)  Resp: (!) 24 (01/30/25 1229)  BP: 133/68 (01/30/25 1229)  SpO2: 97 % (01/30/25 1229) Vital Signs (24h Range):  Temp:  [97.5 °F (36.4 °C)-98.3 °F (36.8 °C)] 97.5 °F (36.4 °C)  Pulse:  [69-82] 76  Resp:  [18-24] 24  SpO2:  [95 %-97 %] 97 %  BP: (111-138)/(55-94) 133/68     Weight: 41.3 kg (91 lb 0.8 oz)  Body mass index is 17.78 kg/m².    Intake/Output Summary (Last 24 hours) at 1/30/2025 1255  Last data filed at 1/30/2025 0736  Gross per 24 hour   Intake 340 ml   Output 325 ml   Net 15 ml         Physical Exam  Vitals and nursing note reviewed.   Constitutional:       General: She is not in acute distress.     Appearance: She is underweight. She is ill-appearing (chronically).   HENT:      Head: Normocephalic and atraumatic.      Mouth/Throat:      Mouth: Mucous membranes are dry.   Eyes:      Extraocular Movements: Extraocular movements intact.      Conjunctiva/sclera: Conjunctivae normal.   Cardiovascular:      Rate and Rhythm: Normal rate and regular rhythm.      Heart sounds: Normal heart sounds.   Pulmonary:      Effort: Pulmonary effort is normal. No respiratory distress.      Breath sounds: Normal breath sounds.   Abdominal:      General: Bowel sounds are normal. There is no distension.      Palpations: Abdomen is soft.      Tenderness: There is no abdominal tenderness.      Comments: Suprapubic catheter in place draining shae colored urine   Musculoskeletal:         General: Tenderness and deformity present. Normal range of motion.      Cervical back: Normal range of motion and neck supple.      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      Left  Lower Extremity: Left leg is amputated above knee.   Skin:     General: Skin is warm and dry.      Capillary Refill: Capillary refill takes less than 2 seconds.      Findings: Erythema and wound present.      Comments: Open wound to R medial thigh   L AKA stump with dry eschar present  Open R hip and sacral wound   See media   Neurological:      General: No focal deficit present.      Mental Status: She is oriented to person, place, and time and easily aroused. She is lethargic.      Motor: Weakness (generalized) present.   Psychiatric:         Behavior: Behavior normal.         Thought Content: Thought content normal.         Judgment: Judgment normal.             Significant Labs: All pertinent labs within the past 24 hours have been reviewed.    Significant Imaging: I have reviewed all pertinent imaging results/findings within the past 24 hours.    Assessment and Plan     * Debility  Patient with acute on chronic debility due to recent bed confinement status from hospitalization and worsening pain to multiple decubitus & vascular wounds. The patient's latest AMPAC (Activity Measure for Post Acute Care) Score is listed below.    AM-PAC Score - How much help does the patient need for each activity listed       Plan  - Progressive mobility protocol initated  - PT/OT consulted & recommending high intensity therapy  - PM&R consulted   - Fall precautions in place    Body mass index (BMI) less than 19  - Body mass index is 17.77 kg/m².   - Encourage maximal PO intake and monitor closely for weight changes.  - Nutrition consulted     Aortic occlusion  Leriche syndrome  Femoral artery occlusion  SMA stenosis     - CTA run-off with complete occlusion of the infrarenal abdominal aorta and iliac arteries, compatible with aortoiliac occlusive disease (Leriche syndrome); Occluded common femoral and superficial femoral arteries bilaterally, with trace collateral opacification of the bilateral deep femoral arteries to each  distal AKA stump; patent but critically stenosed proximal SMA  - Vascular surgery consulted & unfortunately no revascularization options are available   - Continue ASA and plavix   - Smoking cessation & secondary prevention discussed at length     Open wound of right lower extremity with complication  Pressure injury of the sacral region  Pressure ulcer involving back and right hip   Peripheral vascular disease s/p bilateral AKA  - 65 y.o. F with severe vascular disease presenting to the ED for worsening of vascular and decubitus wounds despite compliance with outpatient antibiotics and wound care as described above  - 2/4 SIRS on admission for tachycardia > 90 and leukocytosis to 14, now resolved   - CTA run-off c/w severe vascular disease and multiple occlusions as described above  - Poor wound healing likely 2/2 severe vascular disease and malnutrition vs. ongoing infection   - Vascular surgery consulted & recommended local wound care  - Given vancomycin & zosyn in the ED  - ID consulted, appreciate recs:   D/c Zosyn  Continue vancomycin (pharmacy to dose) and start Ceftriaxone 2 g q24 hrs IV until blood cultures result negative x 48 hrs.   If negative, can transition to oral Doxycycline 100 mg PO BID and Augmentin 875/125 mg PO BID x 14 days  - General surgery consulted, with 4 cm length eschar, 3 mm in thickness, 1 mm depth debrided from right lateral edge of wound. Mechanical debridement with gauze performed; fibrinous exudate on wound. No further intervention at this time  - Wound care consulted, appreciate recs   - Adjust pain regimen to include scheduled NSAIDs & robaxin given adequate renal function, utilize opioids minimally given lethargy on oxycodone 10 mg    Cigarette nicotine dependence with nicotine-induced disorder  - Dangers of cigarette smoking were reviewed in detail for ~12 minutes and pt was encouraged to quit.  - Nicotine replacement options discussed and ordered     Benign essential  HTN  Patient's blood pressure range in the last 24 hours was: BP  Min: 116/62  Max: 145/69.The patient's inpatient anti-hypertensive regimen is listed below:  Current Antihypertensives  chlorthalidone tablet 25 mg, Daily, Oral  losartan tablet 50 mg, Daily, Oral  metoprolol succinate (TOPROL-XL) 24 hr tablet 50 mg, Daily, Oral    Plan  - Hold home chlorthalidone for now given low normal BP - restart when medically indicated     Urinary retention  Suprapubic catheter  - Recurrent issue from recent admission, s/p placement of a suprapubic catheter at [facility]   - Denies abdominal/pelvic pain, CVA tenderness, drainage difficulties, etc  - UA with 38 WBCs, 2+ leukocyte esterase, and occasional yeast but unclear if UA was obtained prior to catheter exchange  - Order placed to exchange suprapubic cath & repeat urine studies    Transaminitis  Hepatitis C  - , ALT 59 on admission   - Recently started on Epclusa as outpatient for treatment of hepatitis C, which the patient has not started yet  - Continue to monitor LFTs on daily labs  - Avoid hepatotoxic agents as able    Hypokalemia  Resolved  Patient's most recent potassium results are listed below.   Recent Labs     01/28/25  1538 01/29/25  0018 01/29/25  0411   K 2.4* 5.4* 5.0     Plan  - Replete potassium per protocol  - Monitor potassium Daily  - Patient's hypokalemia is stable    Peripheral neuropathy  - Chronic issue  - Continue home gabapentin TID - increase as indicated to optimize pain control   - Would benefit from pain management follow-up at discharge     Atherosclerotic heart disease of native coronary artery without angina pectoris  S/p CABG x4  Patient with known CAD s/p CABG, which is controlled. Will continue ASA, Plavix, and Statin and monitor for S/Sx of angina/ACS. Continue to monitor on telemetry.     Diastolic CHF, chronic  Patient is identified as having Diastolic (HFpEF) heart failure that is Chronic. CHF is currently controlled.   Results for  orders placed during the hospital encounter of 01/27/25    Echo    Interpretation Summary    Left Ventricle: The left ventricle is normal in size. Normal wall thickness. Normal wall motion. There is low normal systolic function with a visually estimated ejection fraction of 50 - 55%. There is normal diastolic function.    Right Ventricle: Normal right ventricular cavity size. Wall thickness is normal. Systolic function is mildly reduced.    Aortic Valve: There is moderate aortic valve sclerosis. There is mild annular calcification present. Mildly restricted motion. There is mild stenosis. Aortic valve area by VTI is 1.8 cm2. Aortic valve peak velocity is 1.8 m/s. Mean gradient is 7 mmHg. The dimensionless index is 0.65. There is moderate aortic regurgitation.    Mitral Valve: There is moderate mitral annular calcification present.    Pulmonary Artery: The estimated pulmonary artery systolic pressure is 21 mmHg.    IVC/SVC: Normal venous pressure at 3 mmHg.  - Continue Beta Blocker ACE/ARB and monitor clinical status closely.   - Monitor on telemetry.  - Patient is off CHF pathway.   - Monitor strict Is&Os and daily weights. Place on fluid restriction of 1.5 L.   - Continue to stress to patient importance of self efficacy and  on diet for CHF.    Mixed hyperlipidemia  - Hold statin in setting of elevated LFTs      VTE Risk Mitigation (From admission, onward)           Ordered     enoxaparin injection 30 mg  Daily         01/29/25 1503     IP VTE HIGH RISK PATIENT  Once         01/29/25 1432     Place sequential compression device  Until discontinued         01/28/25 0713                    Discharge Planning   ROYER: 1/31/2025     Code Status: Full Code   Medical Readiness for Discharge Date: 1/29/2025  Discharge Plan A: Rehab                Please place Justification for DME        Cornelia Glasgow PA-C  Department of Hospital Medicine   [facility] - Acute Medical Stepdown

## 2025-01-30 NOTE — SUBJECTIVE & OBJECTIVE
No current facility-administered medications on file prior to encounter.     Current Outpatient Medications on File Prior to Encounter   Medication Sig    ALPRAZolam (XANAX) 0.5 MG tablet Take 0.5 mg by mouth every 6 (six) hours as needed for Anxiety.    amoxicillin-clavulanate 875-125mg (AUGMENTIN) 875-125 mg per tablet Take 1 tablet by mouth 2 (two) times daily.    chlorthalidone (HYGROTEN) 25 MG Tab Take 1 tablet by mouth once daily.    docosahexaenoic acid/epa (FISH OIL ORAL) Take 1 capsule by mouth once daily.    doxycycline (VIBRAMYCIN) 100 MG Cap Take 100 mg by mouth every 12 (twelve) hours.    EPCLUSA 400-100 mg Tab Take 1 tablet by mouth.    gabapentin (NEURONTIN) 400 MG capsule Take 1 capsule (400 mg total) by mouth 3 (three) times daily    losartan (COZAAR) 50 MG tablet Take 1 tablet by mouth once daily.    metoprolol succinate (TOPROL-XL) 50 MG 24 hr tablet Take 50 mg by mouth once daily.    multivit-min/folic acid/lutein (CENTRUM SILVER ORAL) Take 1 tablet by mouth once daily.    traMADoL (ULTRAM) 50 mg tablet Take 50 mg by mouth every 8 (eight) hours as needed for Pain.    clopidogreL (PLAVIX) 75 mg tablet Take 75 mg by mouth. (Patient not taking: Reported on 1/28/2025)       Review of patient's allergies indicates:  No Known Allergies    Past Medical History:   Diagnosis Date    PAD (peripheral artery disease)      Past Surgical History:   Procedure Laterality Date    LEG AMPUTATION       Family History    None       Tobacco Use    Smoking status: Never    Smokeless tobacco: Never   Substance and Sexual Activity    Alcohol use: Yes    Drug use: Not Currently    Sexual activity: Not on file     Review of Systems   Musculoskeletal:  Positive for back pain and myalgias.   Skin:  Positive for wound.   All other systems reviewed and are negative.    Objective:     Vital Signs (Most Recent):  Temp: 98.3 °F (36.8 °C) (01/30/25 0700)  Pulse: 79 (01/30/25 1108)  Resp: 18 (01/30/25 0700)  BP: (!) 136/57  (01/30/25 0700)  SpO2: 96 % (01/30/25 0500) Vital Signs (24h Range):  Temp:  [97.6 °F (36.4 °C)-98.3 °F (36.8 °C)] 98.3 °F (36.8 °C)  Pulse:  [69-82] 79  Resp:  [18-20] 18  SpO2:  [95 %-96 %] 96 %  BP: (111-138)/(55-94) 136/57     Weight: 41.3 kg (91 lb 0.8 oz)  Body mass index is 17.78 kg/m².     Physical Exam  Vitals and nursing note reviewed.   Constitutional:       Appearance: Normal appearance.   HENT:      Head: Normocephalic and atraumatic.   Cardiovascular:      Rate and Rhythm: Normal rate and regular rhythm.   Pulmonary:      Effort: Pulmonary effort is normal. No respiratory distress.   Abdominal:      General: Abdomen is flat. There is no distension.      Palpations: Abdomen is soft. There is no mass.      Tenderness: There is no abdominal tenderness.      Hernia: No hernia is present.   Genitourinary:     Comments: Suprapubic catheter   Musculoskeletal:      Right lower leg: No edema.      Left lower leg: No edema.      Comments: Bilateral AKA    Skin:     General: Skin is warm and dry.   Neurological:      General: No focal deficit present.      Mental Status: She is alert and oriented to person, place, and time.   Psychiatric:         Mood and Affect: Mood normal.         Behavior: Behavior normal.            I have reviewed all pertinent lab results within the past 24 hours.    Significant Diagnostics:  I have reviewed all pertinent imaging results/findings within the past 24 hours.

## 2025-01-30 NOTE — CONSULTS
Recommendations  1. Continue cardiac diet as tolerated   2. Add Boost Plus daily   3. Monitor weight and labs   4. Encourage PO intake  5. Add Ke BID   6. Monitor need for nutrition support   7. Add Vitamin C, zinc, and MVI to promote wound healing      Goals: Pt will meet 85%EEN/EPN by RD follow up    For further information see note from 1/29

## 2025-01-30 NOTE — PT/OT/SLP PROGRESS
Physical Therapy  Co-Treatment with OT    Patient Name:  Daina Ngo   MRN:  [id number]    Recommendations:     Discharge Recommendations: High Intensity Therapy  Discharge Equipment Recommendations: bath bench  Barriers to discharge: Decreased caregiver support    Assessment:     Daina Ngo is a 65 y.o. female admitted with a medical diagnosis of Debility.  She presents with the following impairments/functional limitations: impaired endurance, impaired functional mobility, impaired balance, decreased safety awareness pt tolerated treatment well but pain limited functional mobility. Pt is significantly below pre-admit functional level and will benefit from cont skilled PT 4x/wk to progress physically.  Pt is significantly below previous functional level, increased risk of falls and increased burden of care currently. Patient has demonstrated sufficient progression to warrant high intensity therapy evidenced by objectives noted below.      Rehab Prognosis: Good; patient would benefit from acute skilled PT services to address these deficits and reach maximum level of function.    Recent Surgery: * No surgery found *      Plan:     During this hospitalization, patient to be seen 4 x/week to address the identified rehab impairments via therapeutic activities, wheelchair management/training, neuromuscular re-education and progress toward the following goals:    Plan of Care Expires:  02/28/25    Subjective     Chief Complaint: pt c/o pain during treatment.   Patient/Family Comments/goals:  to have less pain and go home.   Pain/Comfort:  Pain Rating 1: 10/10 (whole body)  Pain Addressed 1: Reposition, Distraction  Pain Rating Post-Intervention 1: 10/10 (whole body)      Objective:     Communicated with nurse  prior to session.  Patient found supine with telemetry, blood pressure cuff (supra pubic catheter, hep lock IV) upon PT entry to room.     General Precautions: Standard, fall  Orthopedic Precautions:  N/A  Braces: N/A  Respiratory Status: Room air     Functional Mobility:  Bed Mobility:   pt needed verbal cues for hand placement and sequencing for functional mobility.   Rolling Left:  maximal assistance  Rolling Right: maximal assistance  Supine to Sit: maximal assistance and of 2 persons  Sit to Supine: maximal assistance and of 2 persons    Balance: pt sat on EOB x 10 min with  SBA with BUE support on bed rails. Pt was max assist  for sitting balance when not using rails. Pt leaned posteriorly and to L side with sitting. PT provided tactile cues at the trunk to weight shift center of gravity over base of support for better sitting balance. With improved sitting balance, pt was able to perform balance activities with OT.    Due to pt complex medical condition, the skill of 2 licensed therapists is needed to maximize treatment session and progression towards goals  Pt white board updated with current therapists name and level of mobility assistance needed.       AM-PAC 6 CLICK MOBILITY  Turning over in bed (including adjusting bedclothes, sheets and blankets)?: 2  Sitting down on and standing up from a chair with arms (e.g., wheelchair, bedside commode, etc.): 1  Moving from lying on back to sitting on the side of the bed?: 2  Moving to and from a bed to a chair (including a wheelchair)?: 2  Need to walk in hospital room?: 1  Climbing 3-5 steps with a railing?: 1  Basic Mobility Total Score: 9       Treatment & Education:  Pt received verbal instructions in PT POC and verbally expressed understanding of suc h.     Patient left left sidelying with all lines intact and call button in reach..    GOALS:   Multidisciplinary Problems       Physical Therapy Goals          Problem: Physical Therapy    Goal Priority Disciplines Outcome Interventions   Physical Therapy Goal     PT, PT/OT Progressing    Description: Goals to be met by: 2/28/2025     Patient will increase functional independence with mobility by  performin. Supine to sit with Modified [place]  2. Sit to supine with Modified [place]  3. Bed to chair transfer with Stand-by Assistance using Slideboard or via anterior/posterior scoot technique.   4. Wheelchair propulsion x150 feet with Modified [place] using bilateral uppper extremities  5. Pt sit on EOB x 10 min with CGA and perform functional activities.                          DME Justifications:  No DME recommended requiring DME justifications    Time Tracking:     PT Received On: 25  PT Start Time: 1129     PT Stop Time: 1149  PT Total Time (min): 20 min     Billable Minutes: Neuromuscular Re-education 20 min        PT/PTA: PT     Number of PTA visits since last PT visit: 0     2025

## 2025-01-30 NOTE — PLAN OF CARE
Patient A&o x4. VSS. Spo2 wnl on RA. C/o 10/10 pain to wounds. PRN's given for pain management with moderate relief. Patient refused to be turned. Complaint's of pelvic pain with movement. Suprapubic cath in place and collecting yellow urine. Instructed to call staff for assistance. No known needs or concerns at this time. Care is ongoing. Bed is locked and low. Call light it within reach.    Problem: Skin Injury Risk Increased  Goal: Skin Health and Integrity  Outcome: Progressing     Problem: Adult Inpatient Plan of Care  Goal: Plan of Care Review  Outcome: Progressing  Goal: Patient-Specific Goal (Individualized)  Outcome: Progressing  Goal: Absence of Hospital-Acquired Illness or Injury  Outcome: Progressing  Goal: Optimal Comfort and Wellbeing  Outcome: Progressing  Goal: Readiness for Transition of Care  Outcome: Progressing     Problem: Infection  Goal: Absence of Infection Signs and Symptoms  Outcome: Progressing     Problem: Wound  Goal: Optimal Coping  Outcome: Progressing  Goal: Optimal Functional Ability  Outcome: Progressing  Goal: Absence of Infection Signs and Symptoms  Outcome: Progressing  Goal: Improved Oral Intake  Outcome: Progressing  Goal: Optimal Pain Control and Function  Outcome: Progressing  Goal: Skin Health and Integrity  Outcome: Progressing  Goal: Optimal Wound Healing  Outcome: Progressing

## 2025-01-30 NOTE — HPI
This is a 65 year old prior smoker with severe PAD and aortoiliac occlusive disease, bilateral AKA, debility, malnutrition, without revascularization options who was admitted to  for second opinion vascular consultation, wound care, and further evaluation. General surgery consulted after wound care evaluation for potential debridement. She has a number of pressure injuries bilaterally to her lower back and legs. She is nontoxic without signs of acute infection but is on empiric antibiotics. She has a chronic eschar (appears similar to blood blister) to left stump and medial right thigh wound of particular concern.

## 2025-01-30 NOTE — CONSULTS
[facility] - Acute [facility] Stepdown  General Surgery  Consult Note    Patient Name: Daina Ngo  MRN: [id number]  Code Status: Full Code  Admission Date: 1/27/2025  Hospital Length of Stay: 2 days  Attending Physician: Chasity Jain MD  Primary Care Provider: [facility] Primary [facility]    Patient information was obtained from patient, past medical records, and primary team.     Inpatient consult to General Surgery  Consult performed by: Alia Moreira MD  Consult ordered by: Cornelia Glasgow PA-C        Subjective:     Principal Problem: Debility    History of Present Illness: This is a 65 year old prior smoker with severe PAD and aortoiliac occlusive disease, bilateral AKA, debility, malnutrition, without revascularization options who was admitted to  for second opinion vascular consultation, wound care, and further evaluation. General surgery consulted after wound care evaluation for potential debridement. She has a number of pressure injuries bilaterally to her lower back and legs. She is nontoxic without signs of acute infection but is on empiric antibiotics. She has a chronic eschar (appears similar to blood blister) to left stump and medial right thigh wound of particular concern.             No current facility-administered medications on file prior to encounter.     Current Outpatient Medications on File Prior to Encounter   Medication Sig    ALPRAZolam (XANAX) 0.5 MG tablet Take 0.5 mg by mouth every 6 (six) hours as needed for Anxiety.    amoxicillin-clavulanate 875-125mg (AUGMENTIN) 875-125 mg per tablet Take 1 tablet by mouth 2 (two) times daily.    chlorthalidone (HYGROTEN) 25 MG Tab Take 1 tablet by mouth once daily.    docosahexaenoic acid/epa (FISH OIL ORAL) Take 1 capsule by mouth once daily.    doxycycline (VIBRAMYCIN) 100 MG Cap Take 100 mg by mouth every 12 (twelve) hours.    EPCLUSA 400-100 mg Tab Take 1 tablet by mouth.    gabapentin (NEURONTIN) 400 MG capsule Take 1 capsule (400 mg  total) by mouth 3 (three) times daily    losartan (COZAAR) 50 MG tablet Take 1 tablet by mouth once daily.    metoprolol succinate (TOPROL-XL) 50 MG 24 hr tablet Take 50 mg by mouth once daily.    multivit-min/folic acid/lutein (CENTRUM SILVER ORAL) Take 1 tablet by mouth once daily.    traMADoL (ULTRAM) 50 mg tablet Take 50 mg by mouth every 8 (eight) hours as needed for Pain.    clopidogreL (PLAVIX) 75 mg tablet Take 75 mg by mouth. (Patient not taking: Reported on 1/28/2025)       Review of patient's allergies indicates:  No Known Allergies    Past Medical History:   Diagnosis Date    PAD (peripheral artery disease)      Past Surgical History:   Procedure Laterality Date    LEG AMPUTATION       Family History    None       Tobacco Use    Smoking status: Never    Smokeless tobacco: Never   Substance and Sexual Activity    Alcohol use: Yes    Drug use: Not Currently    Sexual activity: Not on file     Review of Systems   Musculoskeletal:  Positive for back pain and myalgias.   Skin:  Positive for wound.   All other systems reviewed and are negative.    Objective:     Vital Signs (Most Recent):  Temp: 98.3 °F (36.8 °C) (01/30/25 0700)  Pulse: 79 (01/30/25 1108)  Resp: 18 (01/30/25 0700)  BP: (!) 136/57 (01/30/25 0700)  SpO2: 96 % (01/30/25 0500) Vital Signs (24h Range):  Temp:  [97.6 °F (36.4 °C)-98.3 °F (36.8 °C)] 98.3 °F (36.8 °C)  Pulse:  [69-82] 79  Resp:  [18-20] 18  SpO2:  [95 %-96 %] 96 %  BP: (111-138)/(55-94) 136/57     Weight: 41.3 kg (91 lb 0.8 oz)  Body mass index is 17.78 kg/m².     Physical Exam  Vitals and nursing note reviewed.   Constitutional:       Appearance: Normal appearance.   HENT:      Head: Normocephalic and atraumatic.   Cardiovascular:      Rate and Rhythm: Normal rate and regular rhythm.   Pulmonary:      Effort: Pulmonary effort is normal. No respiratory distress.   Abdominal:      General: Abdomen is flat. There is no distension.      Palpations: Abdomen is soft. There is no mass.       Tenderness: There is no abdominal tenderness.      Hernia: No hernia is present.   Genitourinary:     Comments: Suprapubic catheter   Musculoskeletal:      Right lower leg: No edema.      Left lower leg: No edema.      Comments: Bilateral AKA    Skin:     General: Skin is warm and dry.   Neurological:      General: No focal deficit present.      Mental Status: She is alert and oriented to person, place, and time.   Psychiatric:         Mood and Affect: Mood normal.         Behavior: Behavior normal.            I have reviewed all pertinent lab results within the past 24 hours.    Significant Diagnostics:  I have reviewed all pertinent imaging results/findings within the past 24 hours.    Assessment/Plan:     Wounds, multiple  65 year old debilitated partially immobile patient with prior bilateral AKA and multiple pressure wounds to bilateral stumps, sacrum.     Superficial escharotomy performed   No local anesthesia used  Verbal consent  4 cm length eschar, 3 mm in thickness, 1 mm depth debrided from right lateral edge of wound  Mechanical debridement with gauze performed; fibrinous exudate on wound    No other wounds indicated for debridement at this time  Would not unroof eschar over left stump at this time as she has no infectious concerns and little-no change of healing an open wound at the distal stump due to vascular supply   Remainder of care per primary, wound care. We will sign off       VTE Risk Mitigation (From admission, onward)           Ordered     enoxaparin injection 30 mg  Daily         01/29/25 1503     IP VTE HIGH RISK PATIENT  Once         01/29/25 1432     Place sequential compression device  Until discontinued         01/28/25 0713                    Thank you for your consult. I will sign off. Please contact us if you have any additional questions.    Alia Moreira MD  General Surgery  [facility] - Acute Medical Stepdown

## 2025-01-30 NOTE — ASSESSMENT & PLAN NOTE
Pressure injury of the sacral region  Pressure ulcer involving back and right hip   Peripheral vascular disease s/p bilateral AKA  - 65 y.o. F with severe vascular disease presenting to the ED for worsening of vascular and decubitus wounds despite compliance with outpatient antibiotics and wound care as described above  - 2/4 SIRS on admission for tachycardia > 90 and leukocytosis to 14, now resolved   - CTA run-off c/w severe vascular disease and multiple occlusions as described above  - Poor wound healing likely 2/2 severe vascular disease and malnutrition vs. ongoing infection   - Vascular surgery consulted & recommended local wound care  - Given vancomycin & zosyn in the ED  - ID consulted, appreciate recs:   D/c Zosyn  Continue vancomycin (pharmacy to dose) and start Ceftriaxone 2 g q24 hrs IV until blood cultures result negative x 48 hrs.   If negative, can transition to oral Doxycycline 100 mg PO BID and Augmentin 875/125 mg PO BID x 14 days  - General surgery consulted, with 4 cm length eschar, 3 mm in thickness, 1 mm depth debrided from right lateral edge of wound. Mechanical debridement with gauze performed; fibrinous exudate on wound. No further intervention at this time  - Wound care consulted, appreciate recs   - Adjust pain regimen to include scheduled NSAIDs & robaxin given adequate renal function, utilize opioids minimally given lethargy on oxycodone 10 mg

## 2025-01-30 NOTE — CONSULTS
[facility]    Wound Care     Patient Name:  Daina Ngo  MRN:  [id number]  Date: 1/30/2025  Diagnosis: Debility     History:  Past Medical History:   Diagnosis Date    PAD (peripheral artery disease)      Social History     Socioeconomic History    Marital status: Single   Tobacco Use    Smoking status: Never    Smokeless tobacco: Never   Substance and Sexual Activity    Alcohol use: Yes    Drug use: Not Currently     Social Drivers of Health     Financial Resource Strain: Low Risk  (1/29/2025)    Overall Financial Resource Strain (CARDIA)     Difficulty of Paying Living Expenses: Not very hard   Food Insecurity: No Food Insecurity (1/29/2025)    Hunger Vital Sign     Worried About Running Out of Food in the Last Year: Never true     Ran Out of Food in the Last Year: Never true   Transportation Needs: No Transportation Needs (1/29/2025)    TRANSPORTATION NEEDS     Transportation : No   Physical Activity: Patient Declined (1/29/2025)    Exercise Vital Sign     Days of Exercise per Week: Patient declined     Minutes of Exercise per Session: Patient declined   Stress: No Stress Concern Present (1/29/2025)    [employer] [employer] - Occupational Stress Questionnaire     Feeling of Stress : Not at all   Housing Stability: Low Risk  (1/29/2025)    Housing Stability Vital Sign     Unable to Pay for Housing in the Last Year: No     Homeless in the Last Year: No     Precautions:  Allergies as of 01/27/2025    (No Known Allergies)       WO Assessment Details / Treatment:    Patient seen for wound care: New Consult   Chart reviewed for this encounter.   Labs:   WBC (K/uL)   Date Value   01/30/2025 6.63   01/29/2025 9.51     Glucose (mg/dL)   Date Value   01/30/2025 90   01/29/2025 91     Albumin (g/dL)   Date Value   01/30/2025 1.9 (L)   01/29/2025 2.1 (L)     Aime Score: 13  Nutrition sub-score: 2  Chart review reveals pt has urethral catheter and is bowel incontinent.   Pt  has bilateral AKAs.   Narrative:  Pt seen for WC consultation and agreed to assessment  Chart reviewed for this encounter.   See Flow Sheet for additional documentation and media.    Pt laying in bed, bedside nurse and [facility] nurse in orientation at bedside for assessment/turning assistance. Pt daughter at bedside for assessment. Daughter reports pt has had these wounds and bruises to buttocks for some time and usually received treatment at [facility], she voiced her displeasure with that facility and the care her mom has received there.   Right AKA incision line well approximated, no open wounds noted.   Left anterior upper thigh, intact scab, came off when cleansed w/Vashe and 4x4 gauze revealing yellow/pink tissue, applied hydrofera ready/foam border  Left anterior upper thigh, red, yellow pink tissue noted with distal and lateral black edges, cleansed w/Vashe, applied Hydrofera Ready, covered with foam border.   Right medial upper thigh, black/yellow necrotic tissue noted, yellow/pink tissue to center of wound bed. Cleansed w/Vashe applied Hydrofera Ready, covered with foam border.   Pt turned with assistance revealing purple contusions to left lower buttock, intact skin, no open wounds.   Coccyx/intergluteal fold - three open areas coccyx wound measures 2.4 x 0.6 x 0.7, bone palpated, right lateral intergluteal wound pink moist tissue noted, measurement 1.0 x 0.5 x 0.5, maceration to left side and attached to left distal area measuring 2.2 x 11.3 x 0.5. Per wound is ecchymotic and non-blanchable.   Applied Mepilex Ag, covered with foam border. Will change to Hydrofera Ready at next dressing change.   Right posterolateral wound appears dry yellow/pale pink tissue with pink scar tissue to edges measurements 3.7 x 6.8 x 0.5 undermining 0.5cm @ 5-7 o'clock. Applied hydrofera ready, covered with foam border.     Left lateral distal AKA dry black well adherent eschar - painted w/betadine.     Ke BID.  Immerse ordered.      RECOMMENDATIONS:  Bedside nurse assess for acute changes (purulence, increased redness/swelling, increased drainage, malodor, increased pain, pallor, necrosis) please contact physician on any acute changes.    Nutrition consult   General Surgery consult for right medial upper thigh wound and assessment of other wounds for possible debridement.   Change dressing to ulcerations q3d and PRN soilage  Left lateral AKA - paint with betadine daily.   Ensure Immerse bed settings are correct.     Discussed POC with patient and primary nurse.   See EMR for orders & patient education.     Discussed nutrition and the role of protein in wound healing with the patient. Instructed patient to optimize protein for wound healing.     Bedside nursing to continue care & monitoring.  Bedside nursing to maintain pressure injury prevention interventions, (PIP).     Recommendations made to primary team for above plan.    Thank you for the consult. Wound Care will continue to follow.     01/29/25 1330   WOCN Assessment   WOCN Total Time (mins) 45   Visit Date 01/29/25   Visit Time 1330   Consult Type New   WOCN Speciality Wound   Wound pressure;At risk for pressure Injury;moisture;other   Intervention chart review;assessed;changed;applied;coordination of care;orders   Teaching on-going;complication        Wound 01/27/25 2133 Ulceration Right dorsal Greater trochanter   Date First Assessed/Time First Assessed: 01/27/25 2133   Present on Original Admission: Yes  Primary Wound Type: Ulceration  Side: Right  Orientation: dorsal  Location: Greater trochanter   Wound Image    Dressing Appearance Moist drainage   Drainage Amount Small   Drainage Characteristics/Odor Serosanguineous;Yellow;No odor   Appearance Pink;Red;Yellow   Tissue loss description Full thickness   Red (%), Wound Tissue Color 80 %   Yellow (%), Wound Tissue Color 20 %   Periwound Area Intact;Dry   Wound Edges Defined   Wound Length (cm) 3.7 cm   Wound Width (cm) 6.8 cm    Wound Depth (cm) 0.5 cm   Wound Volume (cm^3) 12.58 cm^3   Wound Surface Area (cm^2) 25.16 cm^2   Care Cleansed with:;Antimicrobial agent   Dressing Applied;Methylene blue/gentian violet;Foam   Off Loading Other (see comments)  (Immerse ordered)   Dressing Change Due 02/02/25        Wound 01/27/25 2135 Ulceration Right upper;medial Thigh   Date First Assessed/Time First Assessed: 01/27/25 2135   Present on Original Admission: Yes  Primary Wound Type: Ulceration  Side: Right  Orientation: upper;medial  Location: Thigh   Wound Image    Dressing Appearance Clean;Dry;Intact   Drainage Amount Scant   Drainage Characteristics/Odor Serosanguineous;No odor   Appearance Pink;Red   Tissue loss description Full thickness   Black (%), Wound Tissue Color 5 %   Red (%), Wound Tissue Color 95 %   Periwound Area Intact;Dry   Wound Edges Defined   Wound Length (cm) 3.1 cm   Wound Width (cm) 3.7 cm   Wound Depth (cm) 0.4 cm   Wound Volume (cm^3) 4.588 cm^3   Wound Surface Area (cm^2) 11.47 cm^2   Care Cleansed with:;Antimicrobial agent   Dressing Applied;Methylene blue/gentian violet;Foam   Dressing Change Due 02/01/25        Wound 01/27/25 2135 Ulceration Left lateral;distal Thigh   Date First Assessed/Time First Assessed: 01/27/25 2135   Present on Original Admission: Yes  Primary Wound Type: Ulceration  Side: Left  Orientation: lateral;distal  Location: (c) Thigh   Wound Image    Dressing Appearance Open to air   Drainage Amount None   Drainage Characteristics/Odor No odor   Appearance Intact;Black;Dry   Tissue loss description Not applicable   Black (%), Wound Tissue Color 100 %   Periwound Area Intact;Dry;Pink   Wound Edges Defined   Wound Length (cm) 4.4 cm   Wound Width (cm) 5 cm   Wound Depth (cm) 0.1 cm   Wound Volume (cm^3) 2.2 cm^3   Wound Surface Area (cm^2) 22 cm^2   Care Applied:;Povidone iodine   Dressing Change Due 01/30/25        Wound 01/29/25 1702 Pressure Injury Sacral spine   Date First Assessed/Time First  Assessed: 01/29/25 1702   Present on Original Admission: Yes  Primary Wound Type: Pressure Injury  Location: Sacral spine   Wound Image    Pressure Injury Stage 4   Dressing Appearance Moist drainage   Drainage Amount Moderate   Drainage Characteristics/Odor Serosanguineous;No odor   Appearance Pink;Red;Yellow;Bone   Tissue loss description Full thickness   Red (%), Wound Tissue Color 90 %   Yellow (%), Wound Tissue Color 10 %   Periwound Area Intact;Ecchymotic   Wound Edges Defined;Irregular   Periwound Care Topical treatment applied   Off Loading Other (see comments)  (immerse ordered)   Dressing Change Due 01/30/25        Wound 01/29/25 1703 Contusion Left lower Buttocks   Date First Assessed/Time First Assessed: 01/29/25 1703   Present on Original Admission: Yes  Primary Wound Type: Contusion  Side: Left  Orientation: lower  Location: Buttocks   Wound Image         Wound 01/29/25 Ulceration Left anterior;upper Thigh   Date First Assessed: 01/29/25   Present on Original Admission: Yes  Primary Wound Type: Ulceration  Side: Left  Orientation: anterior;upper  Location: Thigh   Wound Image     Dressing Appearance Open to air   Drainage Amount None   Drainage Characteristics/Odor No odor   Appearance Pink;Red;Yellow   Tissue loss description Not applicable   Red (%), Wound Tissue Color 10 %   Yellow (%), Wound Tissue Color 90 %   Periwound Area Intact;Dry   Wound Edges Defined   Wound Length (cm) 0.4 cm   Wound Width (cm) 0.3 cm   Wound Depth (cm) 0.1 cm   Wound Volume (cm^3) 0.012 cm^3   Wound Surface Area (cm^2) 0.12 cm^2   Care Cleansed with:;Antimicrobial agent   Dressing Applied;Silver;Foam   Dressing Change Due 02/02/25

## 2025-01-30 NOTE — SUBJECTIVE & OBJECTIVE
Interval History: Pt seen and examined by me this morning. PANKAJ SAINI. No new complaints this morning. General surgery consulted for possible debridement, 1 mm depth debrided from right lateral edge of wound at bedside. No other debrided warranted at this time. Pending placement.     Review of Systems   Constitutional:  Positive for activity change. Negative for chills and fever.   HENT:  Negative for trouble swallowing.    Eyes:  Negative for photophobia and visual disturbance.   Respiratory:  Negative for cough, chest tightness and shortness of breath.    Cardiovascular:  Negative for chest pain, palpitations and leg swelling.   Gastrointestinal:  Positive for constipation (2/2 opioid use). Negative for abdominal pain, diarrhea, nausea and vomiting.   Genitourinary:  Positive for difficulty urinating (suprapubic cath in place). Negative for hematuria and pelvic pain.   Musculoskeletal:  Positive for arthralgias and gait problem. Negative for back pain and neck pain.   Skin:  Positive for color change and wound.   Neurological:  Negative for dizziness, syncope, speech difficulty and light-headedness.   Psychiatric/Behavioral:  Negative for agitation and confusion. The patient is not nervous/anxious.      Objective:     Vital Signs (Most Recent):  Temp: 97.5 °F (36.4 °C) (01/30/25 1229)  Pulse: 76 (01/30/25 1229)  Resp: (!) 24 (01/30/25 1229)  BP: 133/68 (01/30/25 1229)  SpO2: 97 % (01/30/25 1229) Vital Signs (24h Range):  Temp:  [97.5 °F (36.4 °C)-98.3 °F (36.8 °C)] 97.5 °F (36.4 °C)  Pulse:  [69-82] 76  Resp:  [18-24] 24  SpO2:  [95 %-97 %] 97 %  BP: (111-138)/(55-94) 133/68     Weight: 41.3 kg (91 lb 0.8 oz)  Body mass index is 17.78 kg/m².    Intake/Output Summary (Last 24 hours) at 1/30/2025 1255  Last data filed at 1/30/2025 0736  Gross per 24 hour   Intake 340 ml   Output 325 ml   Net 15 ml         Physical Exam  Vitals and nursing note reviewed.   Constitutional:       General: She is not in acute  distress.     Appearance: She is underweight. She is ill-appearing (chronically).   HENT:      Head: Normocephalic and atraumatic.      Mouth/Throat:      Mouth: Mucous membranes are dry.   Eyes:      Extraocular Movements: Extraocular movements intact.      Conjunctiva/sclera: Conjunctivae normal.   Cardiovascular:      Rate and Rhythm: Normal rate and regular rhythm.      Heart sounds: Normal heart sounds.   Pulmonary:      Effort: Pulmonary effort is normal. No respiratory distress.      Breath sounds: Normal breath sounds.   Abdominal:      General: Bowel sounds are normal. There is no distension.      Palpations: Abdomen is soft.      Tenderness: There is no abdominal tenderness.      Comments: Suprapubic catheter in place draining shae colored urine   Musculoskeletal:         General: Tenderness and deformity present. Normal range of motion.      Cervical back: Normal range of motion and neck supple.      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      Left Lower Extremity: Left leg is amputated above knee.   Skin:     General: Skin is warm and dry.      Capillary Refill: Capillary refill takes less than 2 seconds.      Findings: Erythema and wound present.      Comments: Open wound to R medial thigh   L AKA stump with dry eschar present  Open R hip and sacral wound   See media   Neurological:      General: No focal deficit present.      Mental Status: She is oriented to person, place, and time and easily aroused. She is lethargic.      Motor: Weakness (generalized) present.   Psychiatric:         Behavior: Behavior normal.         Thought Content: Thought content normal.         Judgment: Judgment normal.             Significant Labs: All pertinent labs within the past 24 hours have been reviewed.    Significant Imaging: I have reviewed all pertinent imaging results/findings within the past 24 hours.

## 2025-01-30 NOTE — PLAN OF CARE
SHYAM received a email from Mari Gallegos (Utilization Manger) and she stated that [facility] called in regards to pt auth for rehab and [facility] stated that pt insurance hasn't been active since 5/31/23.     SW contacted pt daughter Mg [phone] in regards to insurance matter and daughter stated she would call [facility] to see what's going on.     Pt daughter contacted SHYAM back and stated that the pt no longer has [facility] insurance, but has [employer] insurance plan. [employer] isn't listed on the pt chart, but pt daughter told SW previously during assessment that [facility] was pt primary.   Pt daughter/nor pt stated they wasn't aware of the insurance change.     SW explained to pt daughter that pt is medically ready and can't go to facility without insurance coverage.   Pt daughter stated she's going to contact [facility] and figure things out.     @4pm- SHYAM received message from [facility] stating that pt is more LTAC appropriate. SHYAM sent referral to [facility] and they are now stating that the pt only has one rev code for tele/icu billing. for [employer] she needs 2 more nights on tele or icu to even qualify for any ltacs. SW sent referrals out to sn[facility]. That will be the delay in pt discharge.   Will folow up.     Khushboo Diaz MSW, CSW

## 2025-01-30 NOTE — PT/OT/SLP PROGRESS
Occupational Therapy   Co-Treatment  Co-treatment performed due to patient's multiple deficits requiring two skilled therapists to appropriately and safely assess patient's strength and endurance while facilitating functional tasks in addition to accommodating for patient's activity tolerance.        Name: Daina Nog  MRN: [id number]  Admitting Diagnosis:  Debility       Recommendations:     Discharge Recommendations: High Intensity Therapy  Discharge Equipment Recommendations:  bath bench  Barriers to discharge:  Other (Comment) (increased skilled assistance required)    Assessment:     Daina Ngo is a 65 y.o. female with a medical diagnosis of Debility.  She presents with the following performance deficits affecting function are weakness, impaired endurance, impaired self care skills, impaired functional mobility, impaired balance, pain, decreased safety awareness, decreased upper extremity function, impaired skin.     Pt agreeable to therapy and tolerated fairly. Pt appears to be more alert today compared to yesterday's eval session. Pt was cooperative, followed verbal commands, & was able to engage/assist with bed mobility & UE/UB exercise. However, pt remains significantly limited in ADLs, functional mobility, and functional transfers. Pt would continue to benefit from skilled OT services to maximize functional independence with ADLs and functional mobility, reduce caregiver burden, and facilitate safe discharge in the least restrictive environment.      Rehab Prognosis:  Good; patient would benefit from acute skilled OT services to address these deficits and reach maximum level of function.       Plan:     Patient to be seen 4 x/week to address the above listed problems via self-care/home management, therapeutic activities, therapeutic exercises  Plan of Care Expires: 03/01/25  Plan of Care Reviewed with: patient    Subjective     Chief Complaint: fatigue and generalized pain  Patient/Family  Comments/goals: regain PLOF  Pain/Comfort:  Pain Addressed 1: Distraction, Reposition    Objective:     Communicated with: nurse prior to session.  Patient found HOB elevated with telemetry upon OT entry to room.    General Precautions: Standard, fall    Orthopedic Precautions:N/A  Braces: N/A  Respiratory Status: Room air     Occupational Performance:     Bed Mobility:    Patient completed Rolling/Turning to Left with  maximal assistance    Patient completed Rolling/Turning to Right with maximal assistance    Patient completed Scooting to EOB & HOB with maximal assistance    Patient completed Supine to Sit with maximal assistance    Patient completed Sit to Supine with maximal assistance     Functional Mobility/Transfers:  At this time, pt is unable to complete any transfers due to significant weakness requiring min A -> total assistance to maintain balance & overall safety    Activities of Daily Living:  Deferred due to ADLs complete with nursing staff prior to OT arrival    [facility] 6 Click ADL: 12    Treatment & Education:  Therapeutic exercise: pt completed the following UE exercise to maintain/improve UB strength, eye hand coordination, safety awareness and overall activity tolerance required for participation/independence with ADLs, IADLs & functional mobility/transfers   - Functional reaching exercise while seated EOB    - Pt instructed to reach in various directions including across midline. Pt required 1 hand on bed rail to maintain balance while reaching with other UE  -Education on breathing to maximize occupational performance & improve activity tolerance  -Education on importance therapy participation to improve overall strength , quality of life, & ultimately promote independence.  -Pt educated to call for assistance and to transfer with hospital staff only  Pt had no further questions & when asked whether there were any concerns pt reported none.     Patient left HOB elevated with all lines intact, call  button in reach, and nurse notified    GOALS:   Multidisciplinary Problems       Occupational Therapy Goals          Problem: Occupational Therapy    Goal Priority Disciplines Outcome Interventions   Occupational Therapy Goal     OT, PT/OT Progressing    Description: Goals to be met by: 03/01/2025     Patient will increase functional independence with ADLs by performing:    UE Dressing with Modified [place].  LE Dressing with Modified [place].  Grooming while bedside chair with Set-up Assistance.  Toileting from bedside commode with Modified [place] for hygiene and clothing management.   Rolling to Right, Left with Modified [place].   Supine to sit with Modified [place].  Toilet transfer with slide board to bedside commode with Modified [place].                         Time Tracking:     OT Date of Treatment: 01/30/25  OT Start Time: 1130  OT Stop Time: 1150  OT Total Time (min): 20 min    Billable Minutes:Therapeutic Exercise 20     OT/SHELLY: OT          1/30/2025

## 2025-01-30 NOTE — ASSESSMENT & PLAN NOTE
65 year old debilitated partially immobile patient with prior bilateral AKA and multiple pressure wounds to bilateral stumps, sacrum.     Superficial escharotomy performed   No local anesthesia used  Verbal consent  4 cm length eschar, 3 mm in thickness, 1 mm depth debrided from right lateral edge of wound  Mechanical debridement with gauze performed; fibrinous exudate on wound    No other wounds indicated for debridement at this time  Would not unroof eschar over left stump at this time as she has no infectious concerns and little-no change of healing an open wound at the distal stump due to vascular supply   Remainder of care per primary, wound care. We will sign off

## 2025-01-30 NOTE — PLAN OF CARE
Problem: Physical Therapy  Goal: Physical Therapy Goal  Description: Goals to be met by: 2025     Patient will increase functional independence with mobility by performin. Supine to sit with Modified [place]  2. Sit to supine with Modified [place]  3. Bed to chair transfer with Stand-by Assistance using Slideboard or via anterior/posterior scoot technique.   4. Wheelchair propulsion x150 feet with Modified [place] using bilateral uppper extremities  5. Pt sit on EOB x 10 min with CGA and perform functional activities.     Outcome: Progressing   Goals updated for content and are appropriate. 2025

## 2025-01-31 VITALS
WEIGHT: 91.06 LBS | TEMPERATURE: 98 F | OXYGEN SATURATION: 94 % | DIASTOLIC BLOOD PRESSURE: 60 MMHG | RESPIRATION RATE: 17 BRPM | BODY MASS INDEX: 17.88 KG/M2 | HEART RATE: 80 BPM | HEIGHT: 60 IN | SYSTOLIC BLOOD PRESSURE: 106 MMHG

## 2025-01-31 LAB
ALBUMIN SERPL BCP-MCNC: 2.1 G/DL (ref 3.5–5.2)
ALP SERPL-CCNC: 85 U/L (ref 40–150)
ALT SERPL W/O P-5'-P-CCNC: 64 U/L (ref 10–44)
ANION GAP SERPL CALC-SCNC: 8 MMOL/L (ref 8–16)
AST SERPL-CCNC: 185 U/L (ref 10–40)
BILIRUB SERPL-MCNC: 0.7 MG/DL (ref 0.1–1)
BUN SERPL-MCNC: 16 MG/DL (ref 8–23)
CALCIUM SERPL-MCNC: 8.8 MG/DL (ref 8.7–10.5)
CHLORIDE SERPL-SCNC: 104 MMOL/L (ref 95–110)
CO2 SERPL-SCNC: 19 MMOL/L (ref 23–29)
CREAT SERPL-MCNC: 0.4 MG/DL (ref 0.5–1.4)
ERYTHROCYTE [DISTWIDTH] IN BLOOD BY AUTOMATED COUNT: 13.9 % (ref 11.5–14.5)
EST. GFR  (NO RACE VARIABLE): >60 ML/MIN/1.73 M^2
GLUCOSE SERPL-MCNC: 91 MG/DL (ref 70–110)
HCT VFR BLD AUTO: 38.4 % (ref 37–48.5)
HGB BLD-MCNC: 12.6 G/DL (ref 12–16)
MCH RBC QN AUTO: 32.1 PG (ref 27–31)
MCHC RBC AUTO-ENTMCNC: 32.8 G/DL (ref 32–36)
MCV RBC AUTO: 98 FL (ref 82–98)
PLATELET # BLD AUTO: 146 K/UL (ref 150–450)
PMV BLD AUTO: 9.8 FL (ref 9.2–12.9)
POCT GLUCOSE: 111 MG/DL (ref 70–110)
POCT GLUCOSE: 112 MG/DL (ref 70–110)
POTASSIUM SERPL-SCNC: 4 MMOL/L (ref 3.5–5.1)
PROT SERPL-MCNC: 6.8 G/DL (ref 6–8.4)
RBC # BLD AUTO: 3.92 M/UL (ref 4–5.4)
SODIUM SERPL-SCNC: 131 MMOL/L (ref 136–145)
WBC # BLD AUTO: 7.39 K/UL (ref 3.9–12.7)

## 2025-01-31 PROCEDURE — 25000003 PHARM REV CODE 250

## 2025-01-31 PROCEDURE — 80053 COMPREHEN METABOLIC PANEL: CPT

## 2025-01-31 PROCEDURE — 36415 COLL VENOUS BLD VENIPUNCTURE: CPT

## 2025-01-31 PROCEDURE — S4991 NICOTINE PATCH NONLEGEND: HCPCS

## 2025-01-31 PROCEDURE — 97530 THERAPEUTIC ACTIVITIES: CPT | Mod: CO

## 2025-01-31 PROCEDURE — 85027 COMPLETE CBC AUTOMATED: CPT

## 2025-01-31 RX ORDER — AMOXICILLIN AND CLAVULANATE POTASSIUM 875; 125 MG/1; MG/1
1 TABLET, FILM COATED ORAL 2 TIMES DAILY
Qty: 26 TABLET | Refills: 0 | Status: ON HOLD | OUTPATIENT
Start: 2025-02-01 | End: 2025-02-06 | Stop reason: HOSPADM

## 2025-01-31 RX ORDER — ALPRAZOLAM 0.5 MG/1
0.5 TABLET ORAL 3 TIMES DAILY PRN
Qty: 15 TABLET | Refills: 0 | Status: SHIPPED | OUTPATIENT
Start: 2025-01-31 | End: 2025-02-05

## 2025-01-31 RX ORDER — ALPRAZOLAM 0.5 MG/1
0.5 TABLET ORAL 3 TIMES DAILY PRN
Qty: 15 TABLET | Refills: 0 | Status: SHIPPED | OUTPATIENT
Start: 2025-01-31 | End: 2025-01-31

## 2025-01-31 RX ORDER — ASPIRIN 81 MG/1
81 TABLET ORAL DAILY
Qty: 90 TABLET | Refills: 3 | Status: ON HOLD | OUTPATIENT
Start: 2025-02-01 | End: 2025-02-06 | Stop reason: HOSPADM

## 2025-01-31 RX ORDER — GABAPENTIN 400 MG/1
400 CAPSULE ORAL 3 TIMES DAILY
Qty: 90 CAPSULE | Refills: 2 | Status: SHIPPED | OUTPATIENT
Start: 2025-01-31 | End: 2025-05-01

## 2025-01-31 RX ORDER — CALCIUM CARBONATE 200(500)MG
1000 TABLET,CHEWABLE ORAL 2 TIMES DAILY
Qty: 120 TABLET | Refills: 11 | Status: ON HOLD | OUTPATIENT
Start: 2025-01-31 | End: 2025-02-05

## 2025-01-31 RX ORDER — IBUPROFEN 200 MG
1 TABLET ORAL DAILY
Qty: 28 PATCH | Refills: 0 | Status: ON HOLD | OUTPATIENT
Start: 2025-01-31 | End: 2025-02-05

## 2025-01-31 RX ORDER — IBUPROFEN 400 MG/1
400 TABLET ORAL EVERY 6 HOURS PRN
Qty: 120 TABLET | Refills: 0 | Status: ON HOLD | OUTPATIENT
Start: 2025-01-31 | End: 2025-02-06 | Stop reason: HOSPADM

## 2025-01-31 RX ORDER — ACETAMINOPHEN 325 MG/1
650 TABLET ORAL EVERY 8 HOURS PRN
Qty: 180 TABLET | Refills: 0 | Status: ON HOLD | OUTPATIENT
Start: 2025-01-31 | End: 2025-02-05

## 2025-01-31 RX ORDER — DOXYCYCLINE 100 MG/1
100 CAPSULE ORAL EVERY 12 HOURS
Qty: 26 CAPSULE | Refills: 0 | Status: ON HOLD | OUTPATIENT
Start: 2025-02-01 | End: 2025-02-06 | Stop reason: HOSPADM

## 2025-01-31 RX ADMIN — AMOXICILLIN AND CLAVULANATE POTASSIUM 1 TABLET: 875; 125 TABLET, FILM COATED ORAL at 08:01

## 2025-01-31 RX ADMIN — METHOCARBAMOL 500 MG: 500 TABLET ORAL at 08:01

## 2025-01-31 RX ADMIN — CALCIUM CARBONATE (ANTACID) CHEW TAB 500 MG 1000 MG: 500 CHEW TAB at 08:01

## 2025-01-31 RX ADMIN — GABAPENTIN 400 MG: 400 CAPSULE ORAL at 08:01

## 2025-01-31 RX ADMIN — NICOTINE 1 PATCH: 14 PATCH, EXTENDED RELEASE TRANSDERMAL at 08:01

## 2025-01-31 RX ADMIN — IBUPROFEN 400 MG: 400 TABLET ORAL at 08:01

## 2025-01-31 RX ADMIN — IBUPROFEN 400 MG: 400 TABLET ORAL at 02:01

## 2025-01-31 RX ADMIN — LOSARTAN POTASSIUM 50 MG: 50 TABLET, FILM COATED ORAL at 08:01

## 2025-01-31 RX ADMIN — ASPIRIN 81 MG: 81 TABLET, COATED ORAL at 08:01

## 2025-01-31 RX ADMIN — METHOCARBAMOL 500 MG: 500 TABLET ORAL at 02:01

## 2025-01-31 RX ADMIN — GABAPENTIN 400 MG: 400 CAPSULE ORAL at 02:01

## 2025-01-31 RX ADMIN — CLOPIDOGREL BISULFATE 75 MG: 75 TABLET ORAL at 08:01

## 2025-01-31 RX ADMIN — METOPROLOL SUCCINATE 50 MG: 50 TABLET, EXTENDED RELEASE ORAL at 08:01

## 2025-01-31 RX ADMIN — DOXYCYCLINE HYCLATE 100 MG: 100 TABLET, COATED ORAL at 08:01

## 2025-01-31 NOTE — SUBJECTIVE & OBJECTIVE
Interval History: Pt seen and examined by me this morning. SHARLENE. PARVEEN. Patient sleepy on exam but easily woken with verbal stimuli. Continue PO abx. Pending placement.     Review of Systems   Constitutional:  Positive for activity change. Negative for chills and fever.   HENT:  Negative for trouble swallowing.    Eyes:  Negative for photophobia and visual disturbance.   Respiratory:  Negative for cough, chest tightness and shortness of breath.    Cardiovascular:  Negative for chest pain, palpitations and leg swelling.   Gastrointestinal:  Positive for constipation (2/2 opioid use). Negative for abdominal pain, diarrhea, nausea and vomiting.   Genitourinary:  Positive for difficulty urinating (suprapubic cath in place). Negative for hematuria and pelvic pain.   Musculoskeletal:  Positive for arthralgias and gait problem. Negative for back pain and neck pain.   Skin:  Positive for color change and wound.   Neurological:  Negative for dizziness, syncope, speech difficulty and light-headedness.   Psychiatric/Behavioral:  Negative for agitation and confusion. The patient is not nervous/anxious.      Objective:     Vital Signs (Most Recent):  Temp: 98.4 °F (36.9 °C) (01/31/25 0700)  Pulse: 87 (01/31/25 0700)  Resp: 17 (01/31/25 0700)  BP: 112/69 (01/31/25 0700)  SpO2: 98 % (01/31/25 0700) Vital Signs (24h Range):  Temp:  [97.5 °F (36.4 °C)-99.2 °F (37.3 °C)] 98.4 °F (36.9 °C)  Pulse:  [76-94] 87  Resp:  [17-24] 17  SpO2:  [97 %-98 %] 98 %  BP: ()/(52-83) 112/69     Weight: 41.3 kg (91 lb 0.8 oz)  Body mass index is 17.78 kg/m².    Intake/Output Summary (Last 24 hours) at 1/31/2025 1018  Last data filed at 1/31/2025 0643  Gross per 24 hour   Intake --   Output 1550 ml   Net -1550 ml         Physical Exam  Vitals and nursing note reviewed.   Constitutional:       General: She is not in acute distress.     Appearance: Normal appearance. She is underweight. She is ill-appearing (chronically).   HENT:      Head:  Normocephalic and atraumatic.      Right Ear: External ear normal.      Left Ear: External ear normal.      Mouth/Throat:      Mouth: Mucous membranes are dry.   Eyes:      Extraocular Movements: Extraocular movements intact.      Conjunctiva/sclera: Conjunctivae normal.      Pupils: Pupils are equal, round, and reactive to light.   Cardiovascular:      Rate and Rhythm: Normal rate and regular rhythm.      Pulses: Normal pulses.      Heart sounds: Normal heart sounds. No murmur heard.  Pulmonary:      Effort: Pulmonary effort is normal. No respiratory distress.      Breath sounds: Normal breath sounds.   Abdominal:      General: Abdomen is flat. Bowel sounds are normal. There is no distension.      Palpations: Abdomen is soft.      Tenderness: There is no abdominal tenderness.      Comments: Suprapubic catheter in place draining shae colored urine   Musculoskeletal:         General: Tenderness and deformity present. Normal range of motion.      Cervical back: Normal range of motion and neck supple. No tenderness.      Right lower leg: No edema.      Left lower leg: No edema.      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      Left Lower Extremity: Left leg is amputated above knee.   Skin:     General: Skin is warm and dry.      Capillary Refill: Capillary refill takes less than 2 seconds.      Coloration: Skin is not jaundiced.      Findings: Erythema and wound present.      Comments: Open wound to R medial thigh   L AKA stump with dry eschar present  Open R hip and sacral wound   See media   Neurological:      General: No focal deficit present.      Mental Status: She is oriented to person, place, and time and easily aroused. Mental status is at baseline. She is lethargic.      Motor: Weakness (generalized) present.   Psychiatric:         Mood and Affect: Mood normal.         Behavior: Behavior normal.         Thought Content: Thought content normal.         Judgment: Judgment normal.              Significant Labs: All pertinent labs within the past 24 hours have been reviewed.    Significant Imaging: I have reviewed all pertinent imaging results/findings within the past 24 hours.

## 2025-01-31 NOTE — PROGRESS NOTES
[facility] - [facility] Medicine  Progress Note    Patient Name: Daina Ngo  MRN: [id number]  Patient Class: IP- Inpatient   Admission Date: 1/27/2025  Length of Stay: 3 days  Attending Physician: Chasity Jain MD  Primary Care Provider: Page, Primary Doctor        Subjective     Principal Problem:Debility        HPI:  Daina Ngo is a 65 y.o. F with severe PAD s/p bilateral AKAs, HTN, HLD, aortic occlusion, hepatitis C, nicotine dependence, and depression/anxiety who presented to the ED for evaluation of multiple wounds. Per chart review, pt was recently admitted for 8-day stay at [facility] for infected decubitus ulcers, where she was treated with broad spectrum antibiotics and transitioned to augmentin and doxycycline at discharge (discharged 1/20). During that admission, she was also found to have an almost totally occluded descending abdominal aorta for which no interventions were performed & also had recurrent urinary retention for which urology placed a suprapubic catheter. She was discharged home with home health but now presents to ED for evaluation given progressively worsening wound pain (predominately to the R stump and buttocks) and debility with new inability to safely care for herself at home. She reports compliance with augmentin and doxycycline regimen at home but believes her wounds are not improving and have become more painful since her discharge. Pt denies fever, chills, chest pain, palpitations, SOB, cough, abdominal pain, N/V/D, HA, or syncope.    In the ED: Mildly tachycardic to 100s and hypotensive to 80s/60s which improved with IVFs o/w VSSAF.  CBC with leukocytosis to 14. CMP with K 2.8, , ALT 59. UA with 38 WBCs 2+ leuk esterase, and occasional yeast. CTA run-off with complete occlusion of the infrarenal abdominal aorta and iliac arteries, compatible with aortoiliac occlusive disease (Leriche syndrome); Occluded common femoral and superficial  femoral arteries bilaterally, with trace collateral opacification of the bilateral deep femoral arteries to each distal AKA stump; patent but critically stenosed proximal SMA; and multiple acute vs subacute pelvic/acetabular fractures. Pt was given vancomycin, zosyn, potassium, morphine, dilaudid, and IVFs and was admitted to hospital medicine for further management.     Overview/Hospital Course:  Ms. Ngo was admitted for open wound of the RLE and significant PAD with uncontrolled pain. Vascular surgery consulted at admission and unfortunately no revascularization options are available so local wound care was recommended. Pt was initially started on vancomycin and zosyn and subsequently transitioned to PO doxycycline and augmentin for 14 day course per ID recs. Wound care consulted and following. General surgery consulted with bedside debridement. PT/OT consulted and recommending high intensity therapy. CM following to aid in placement.     Interval History: Pt seen and examined by me this morning. PANKAJ SAINI. Patient sleepy on exam but easily woken with verbal stimuli. Continue PO abx. Pending placement.     Review of Systems   Constitutional:  Positive for activity change. Negative for chills and fever.   HENT:  Negative for trouble swallowing.    Eyes:  Negative for photophobia and visual disturbance.   Respiratory:  Negative for cough, chest tightness and shortness of breath.    Cardiovascular:  Negative for chest pain, palpitations and leg swelling.   Gastrointestinal:  Positive for constipation (2/2 opioid use). Negative for abdominal pain, diarrhea, nausea and vomiting.   Genitourinary:  Positive for difficulty urinating (suprapubic cath in place). Negative for hematuria and pelvic pain.   Musculoskeletal:  Positive for arthralgias and gait problem. Negative for back pain and neck pain.   Skin:  Positive for color change and wound.   Neurological:  Negative for dizziness, syncope, speech difficulty and  light-headedness.   Psychiatric/Behavioral:  Negative for agitation and confusion. The patient is not nervous/anxious.      Objective:     Vital Signs (Most Recent):  Temp: 98.4 °F (36.9 °C) (01/31/25 0700)  Pulse: 87 (01/31/25 0700)  Resp: 17 (01/31/25 0700)  BP: 112/69 (01/31/25 0700)  SpO2: 98 % (01/31/25 0700) Vital Signs (24h Range):  Temp:  [97.5 °F (36.4 °C)-99.2 °F (37.3 °C)] 98.4 °F (36.9 °C)  Pulse:  [76-94] 87  Resp:  [17-24] 17  SpO2:  [97 %-98 %] 98 %  BP: ()/(52-83) 112/69     Weight: 41.3 kg (91 lb 0.8 oz)  Body mass index is 17.78 kg/m².    Intake/Output Summary (Last 24 hours) at 1/31/2025 1018  Last data filed at 1/31/2025 0643  Gross per 24 hour   Intake --   Output 1550 ml   Net -1550 ml         Physical Exam  Vitals and nursing note reviewed.   Constitutional:       General: She is not in acute distress.     Appearance: Normal appearance. She is underweight. She is ill-appearing (chronically).   HENT:      Head: Normocephalic and atraumatic.      Right Ear: External ear normal.      Left Ear: External ear normal.      Mouth/Throat:      Mouth: Mucous membranes are dry.   Eyes:      Extraocular Movements: Extraocular movements intact.      Conjunctiva/sclera: Conjunctivae normal.      Pupils: Pupils are equal, round, and reactive to light.   Cardiovascular:      Rate and Rhythm: Normal rate and regular rhythm.      Pulses: Normal pulses.      Heart sounds: Normal heart sounds. No murmur heard.  Pulmonary:      Effort: Pulmonary effort is normal. No respiratory distress.      Breath sounds: Normal breath sounds.   Abdominal:      General: Abdomen is flat. Bowel sounds are normal. There is no distension.      Palpations: Abdomen is soft.      Tenderness: There is no abdominal tenderness.      Comments: Suprapubic catheter in place draining shae colored urine   Musculoskeletal:         General: Tenderness and deformity present. Normal range of motion.      Cervical back: Normal range of  motion and neck supple. No tenderness.      Right lower leg: No edema.      Left lower leg: No edema.      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      Left Lower Extremity: Left leg is amputated above knee.   Skin:     General: Skin is warm and dry.      Capillary Refill: Capillary refill takes less than 2 seconds.      Coloration: Skin is not jaundiced.      Findings: Erythema and wound present.      Comments: Open wound to R medial thigh   L AKA stump with dry eschar present  Open R hip and sacral wound   See media   Neurological:      General: No focal deficit present.      Mental Status: She is oriented to person, place, and time and easily aroused. Mental status is at baseline. She is lethargic.      Motor: Weakness (generalized) present.   Psychiatric:         Mood and Affect: Mood normal.         Behavior: Behavior normal.         Thought Content: Thought content normal.         Judgment: Judgment normal.             Significant Labs: All pertinent labs within the past 24 hours have been reviewed.    Significant Imaging: I have reviewed all pertinent imaging results/findings within the past 24 hours.    Assessment and Plan     * Debility  Patient with acute on chronic debility due to recent bed confinement status from hospitalization and worsening pain to multiple decubitus & vascular wounds. The patient's latest AMPAC (Activity Measure for Post Acute Care) Score is listed below.    AM-PAC Score - How much help does the patient need for each activity listed       Plan  - Progressive mobility protocol initated  - PT/OT consulted & recommending high intensity therapy  - PM&R consulted   - Fall precautions in place    Body mass index (BMI) less than 19  - Body mass index is 17.77 kg/m².   - Encourage maximal PO intake and monitor closely for weight changes.  - Nutrition consulted     Aortic occlusion  Leriche syndrome  Femoral artery occlusion  SMA stenosis     - CTA run-off with complete  occlusion of the infrarenal abdominal aorta and iliac arteries, compatible with aortoiliac occlusive disease (Leriche syndrome); Occluded common femoral and superficial femoral arteries bilaterally, with trace collateral opacification of the bilateral deep femoral arteries to each distal AKA stump; patent but critically stenosed proximal SMA  - Vascular surgery consulted & unfortunately no revascularization options are available   - Continue ASA and plavix   - Smoking cessation & secondary prevention discussed at length     Open wound of right lower extremity with complication  Pressure injury of the sacral region  Pressure ulcer involving back and right hip   Peripheral vascular disease s/p bilateral AKA  - 65 y.o. F with severe vascular disease presenting to the ED for worsening of vascular and decubitus wounds despite compliance with outpatient antibiotics and wound care as described above  - 2/4 SIRS on admission for tachycardia > 90 and leukocytosis to 14, now resolved   - CTA run-off c/w severe vascular disease and multiple occlusions as described above  - Poor wound healing likely 2/2 severe vascular disease and malnutrition vs. ongoing infection   - Vascular surgery consulted & recommended local wound care  - Given vancomycin & zosyn in the ED  - ID consulted, appreciate recs:   D/c Zosyn  Continue vancomycin (pharmacy to dose) and start Ceftriaxone 2 g q24 hrs IV until blood cultures result negative x 48 hrs.   If negative, can transition to oral Doxycycline 100 mg PO BID and Augmentin 875/125 mg PO BID x 14 days  - General surgery consulted, with 4 cm length eschar, 3 mm in thickness, 1 mm depth debrided from right lateral edge of wound. Mechanical debridement with gauze performed; fibrinous exudate on wound. No further intervention at this time  - Wound care consulted, appreciate recs   - Adjust pain regimen to include scheduled NSAIDs & robaxin given adequate renal function, utilize opioids minimally  given lethargy on oxycodone 10 mg    Cigarette nicotine dependence with nicotine-induced disorder  - Dangers of cigarette smoking were reviewed in detail for ~12 minutes and pt was encouraged to quit.  - Nicotine replacement options discussed and ordered     Benign essential HTN  Patient's blood pressure range in the last 24 hours was: BP  Min: 116/62  Max: 145/69.The patient's inpatient anti-hypertensive regimen is listed below:  Current Antihypertensives  chlorthalidone tablet 25 mg, Daily, Oral  losartan tablet 50 mg, Daily, Oral  metoprolol succinate (TOPROL-XL) 24 hr tablet 50 mg, Daily, Oral    Plan  - Hold home chlorthalidone for now given low normal BP - restart when medically indicated     Urinary retention  Suprapubic catheter  - Recurrent issue from recent admission, s/p placement of a suprapubic catheter at [facility]   - Denies abdominal/pelvic pain, CVA tenderness, drainage difficulties, etc  - UA with 38 WBCs, 2+ leukocyte esterase, and occasional yeast but unclear if UA was obtained prior to catheter exchange  - Order placed to exchange suprapubic cath & repeat urine studies    Transaminitis  Hepatitis C  - , ALT 59 on admission   - Recently started on Epclusa as outpatient for treatment of hepatitis C, which the patient has not started yet  - Continue to monitor LFTs on daily labs  - Avoid hepatotoxic agents as able    Hypokalemia  Resolved  Patient's most recent potassium results are listed below.   Recent Labs     01/29/25  0411 01/30/25  0430 01/31/25  0429   K 5.0 4.2 4.0     Plan  - Replete potassium per protocol  - Monitor potassium Daily  - Patient's hypokalemia is stable    Peripheral neuropathy  - Chronic issue  - Continue home gabapentin TID - increase as indicated to optimize pain control   - Would benefit from pain management follow-up at discharge     Atherosclerotic heart disease of native coronary artery without angina pectoris  S/p CABG x4  Patient with known CAD s/p CABG, which is  controlled. Will continue ASA, Plavix, and Statin and monitor for S/Sx of angina/ACS. Continue to monitor on telemetry.     Diastolic CHF, chronic  Patient is identified as having Diastolic (HFpEF) heart failure that is Chronic. CHF is currently controlled.   Results for orders placed during the hospital encounter of 01/27/25    Echo    Interpretation Summary    Left Ventricle: The left ventricle is normal in size. Normal wall thickness. Normal wall motion. There is low normal systolic function with a visually estimated ejection fraction of 50 - 55%. There is normal diastolic function.    Right Ventricle: Normal right ventricular cavity size. Wall thickness is normal. Systolic function is mildly reduced.    Aortic Valve: There is moderate aortic valve sclerosis. There is mild annular calcification present. Mildly restricted motion. There is mild stenosis. Aortic valve area by VTI is 1.8 cm2. Aortic valve peak velocity is 1.8 m/s. Mean gradient is 7 mmHg. The dimensionless index is 0.65. There is moderate aortic regurgitation.    Mitral Valve: There is moderate mitral annular calcification present.    Pulmonary Artery: The estimated pulmonary artery systolic pressure is 21 mmHg.    IVC/SVC: Normal venous pressure at 3 mmHg.  - Continue Beta Blocker ACE/ARB and monitor clinical status closely.   - Monitor on telemetry.  - Patient is off CHF pathway.   - Monitor strict Is&Os and daily weights. Place on fluid restriction of 1.5 L.   - Continue to stress to patient importance of self efficacy and  on diet for CHF.    Mixed hyperlipidemia  - Hold statin in setting of elevated LFTs      VTE Risk Mitigation (From admission, onward)           Ordered     enoxaparin injection 30 mg  Daily         01/29/25 1503     IP VTE HIGH RISK PATIENT  Once         01/29/25 1432     Place sequential compression device  Until discontinued         01/28/25 0713                    Discharge Planning   ROYER: 1/31/2025     Code Status:  Full Code   Medical Readiness for Discharge Date: 1/29/2025  Discharge Plan A: Rehab                Please place Justification for DME        Cornelia Glasgow PA-C  Department of Hospital Medicine   [facility] - Acute Medical Stepdown

## 2025-01-31 NOTE — PT/OT/SLP PROGRESS
Occupational Therapy   Treatment  A client care conference was completed by the OTR and the HUNT prior to treatment by the OTR to discuss the patient's POC and current status.     Name: Daina Ngo  MRN: [id number]  Admitting Diagnosis:  Debility       Recommendations:     Discharge Recommendations: High Intensity Therapy  Discharge Equipment Recommendations:  bath bench  Barriers to discharge:  Other (Comment) (increased skilled assistance required)    Assessment:     Daina Ngo is a 65 y.o. female with a medical diagnosis of Debility.  She presents with the following performance deficits affecting function are impaired endurance, impaired balance, pain, decreased safety awareness, impaired self care skills, impaired functional mobility, decreased lower extremity function. Pt with poor participation this date limited by  grimacing faces observed possibly due to pain and pt very somnolent. Pt currently functioning below independence requiring significant assistance for all needs this tx session. She will continue to benefit from OT services to safely improve her performance deficits needed to maximize her independence for daily occupational performance.    Rehab Prognosis:  Good; patient would benefit from acute skilled OT services to address these deficits and reach maximum level of function.       Plan:     Patient to be seen 4 x/week to address the above listed problems via self-care/home management, therapeutic activities, therapeutic exercises  Plan of Care Expires: 02/01/25  Plan of Care Reviewed with: patient    Subjective     Chief Complaint: None stated; pt observed making grimacing faces.  Patient/Family Comments/goals: Pt agreeable to tx session  Pain/Comfort:  Pain Rating 1: other (see comments) (pt did not quantify pain rating; observed making grimacing faces during tx session)  Location - Orientation 1: generalized  Location 1:  (Pt did not specify location of pain)  Pain Addressed 1:  Reposition, Distraction    Objective:     Communicated with: nurseTracy, prior to session.  Patient found  laying horizontally in bed  with telemetry, blood pressure cuff, Other (comments) (suprapubic catheter, hep lock IV, B AKA) upon OT entry to room. Nurse arrived in room with writing therapist.     General Precautions: Standard, fall (standard)    Orthopedic Precautions:N/A  Braces: N/A  Respiratory Status: Room air     Occupational Performance:     Bed Mobility:    Patient completed Rolling/Turning to Left with  total assistance  Patient completed Rolling/Turning to Right with total assistance  Patient completed Scooting/Bridging with total assistance and 2 persons (Nurse and HUNT) towards HOB via draw sheet  Total A x 2 persons (nurse and HUNT) to rotate person to upright position in supine due to pt found laying horizontally (across) bed    Functional Mobility/Transfers:  Not assessed this date due to pt very somnolent and unable to maintain eyes open during bed mobility      Activities of Daily Living:  Upper Body Dressing: total assistance to doff soiled gown due to bowel movement and donned clean gown in supine with total A; writing therapist prompted pt to thread UE through sleeves, but pt very somnolent and not following command requiring total A to francisco javier clean gown in supine   Toileting: total A 2/2 suprapubic catheter in place; total A for demond-rectal hygiene in L side-lying postion due to pt soiled from bowel movement. Nurse at bedside to replace bandage dressings on sacral region due to original dressing soiled from BM.       [facility] 6 Click ADL: 12    Treatment & Education:  Pt performed bed mobility, functional mobility/transfers, and ADLs as documented above.     Pt educated and instructed on the following:  OT POC  Role of HUNT  Use of call bell for all assistance  Addressed questions and /or concerns within HUNT scope of practice  Pt verbalized understanding     Patient left supine HOB elevated  ~35+* degrees with all lines intact, call button in reach, nurse present at bedside. Pt appears in NAD.    GOALS:   Multidisciplinary Problems       Occupational Therapy Goals          Problem: Occupational Therapy    Goal Priority Disciplines Outcome Interventions   Occupational Therapy Goal     OT, PT/OT Progressing    Description: Goals to be met by: 03/01/2025     Patient will increase functional independence with ADLs by performing:    UE Dressing with Modified Universal.  LE Dressing with Modified Universal.  Grooming while bedside chair with Set-up Assistance.  Toileting from bedside commode with Modified Universal for hygiene and clothing management.   Rolling to Right, Left with Modified Universal.   Supine to sit with Modified Universal.  Toilet transfer with slide board to bedside commode with Modified Universal.                           Time Tracking:     OT Date of Treatment: 01/31/25  OT Start Time: 1059  OT Stop Time: 1114  OT Total Time (min): 15 min    Billable Minutes:Therapeutic Activity 15    OT/SHELLY: SHELLY     Number of SHELLY visits since last OT visit: 1 1/31/2025

## 2025-01-31 NOTE — PLAN OF CARE
[facility] - Acute Medical Stepdown      HOME HEALTH ORDERS  FACE TO FACE ENCOUNTER    Patient Name: Daina Ngo  YOB: 1960    PCP: No, Primary Doctor   PCP Address: None  PCP Phone Number: None  PCP Fax: None    Encounter Date: 1/27/25    Admit to Home Health    Diagnoses:  Active Hospital Problems    Diagnosis  POA    *Debility [R53.81]  Yes    Wounds, multiple [T07.XXXA]  Yes     Chronic    Moderate protein-calorie malnutrition [E44.0]  Yes    Hypokalemia [E87.6]  Yes    Transaminitis [R74.01]  Yes    Urinary retention [R33.9]  Yes    Suprapubic catheter [Z93.59]  Not Applicable    Benign essential HTN [I10]  Yes    Open wound of right lower extremity with complication [S81.801A]  Yes    Pressure injury of skin of sacral region [L89.159]  Yes    Aortic occlusion [I70.0]  Not Applicable    Leriche syndrome [I74.09]  Yes    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]  Yes    Superior mesenteric artery stenosis [K55.1]  Yes    Body mass index (BMI) less than 19 [Z68.1]  Not Applicable    Pressure ulcer of contiguous region involving back and right hip [L89.40]  Yes    S/P CABG x 4 [Z95.1]  Not Applicable    S/P AKA (above knee amputation) bilateral [Z89.611, Z89.612]  Not Applicable    Peripheral neuropathy [G62.9]  Yes    Mixed hyperlipidemia [E78.2]  Yes    Diastolic CHF, chronic [I50.32]  Yes    Peripheral vascular disease, unspecified [I73.9]  Yes    Atherosclerotic heart disease of native coronary artery without angina pectoris [I25.10]  Yes    Cigarette nicotine dependence with nicotine-induced disorder [F17.219]  Yes      Resolved Hospital Problems   No resolved problems to display.       Follow Up Appointments:  No future appointments.    Allergies:Review of patient's allergies indicates:  No Known Allergies    Medications: Review discharge medications with patient and family and provide education.    Current Facility-Administered Medications   Medication Dose Route Frequency Provider Last Rate  Last Admin    acetaminophen tablet 650 mg  650 mg Oral Q8H PRN Arlen Shrestha PA-C        acetaminophen tablet 650 mg  650 mg Oral Q4H PRN Arlen Shrestha PA-C        albuterol sulfate nebulizer solution 10 mg  10 mg Nebulization Once Gallo Sanford MD        albuterol-ipratropium 2.5 mg-0.5 mg/3 mL nebulizer solution 3 mL  3 mL Nebulization Q6H PRN Arlen Shrestha PA-C        aluminum-magnesium hydroxide-simethicone 200-200-20 mg/5 mL suspension 30 mL  30 mL Oral QID PRN Arlen Shrestha PA-C        amoxicillin-clavulanate 875-125mg per tablet 1 tablet  1 tablet Oral Q12H Ninoska Odonnell PA-C   1 tablet at 01/31/25 0805    aspirin EC tablet 81 mg  81 mg Oral Daily Ninoska Odonnell PA-C   81 mg at 01/31/25 0805    calcium carbonate 200 mg calcium (500 mg) chewable tablet 1,000 mg  1,000 mg Oral BID Ninoska Odonnell PA-C   1,000 mg at 01/31/25 0805    clopidogreL tablet 75 mg  75 mg Oral Daily Ninoska Odonnell PA-C   75 mg at 01/31/25 0805    dextrose 50% injection 12.5 g  12.5 g Intravenous PRN Arlen Shrestha PA-C        dextrose 50% injection 25 g  25 g Intravenous PRN Arlen Shrestha PA-C        doxycycline tablet 100 mg  100 mg Oral Q12H Ninoska Odonnell PA-C   100 mg at 01/31/25 0805    enoxaparin injection 30 mg  30 mg Subcutaneous Daily Ninoska Odonnell PA-C   30 mg at 01/30/25 1743    gabapentin capsule 400 mg  400 mg Oral TID Ninoska Odonnell PA-C   400 mg at 01/31/25 0805    glucagon (human recombinant) injection 1 mg  1 mg Intramuscular PRN Arlen Shrestha PA-C        glucose chewable tablet 16 g  16 g Oral PRN Arlen Shrestha PA-C        glucose chewable tablet 24 g  24 g Oral PRN Arlen Shrestha PA-C        ibuprofen tablet 400 mg  400 mg Oral TID Ninoska Odonnell PA-C   400 mg at 01/31/25 0805    ketorolac injection 15 mg  15 mg Intravenous Q6H PRN Ninoska Odonnell PA-C   15 mg at 01/29/25 2326    losartan tablet 50 mg  50 mg Oral Daily Ninoska Odonnell PA-C   50 mg  at 01/31/25 0805    melatonin tablet 6 mg  6 mg Oral Nightly PRN Arlen Shrestha PA-C        methocarbamoL tablet 500 mg  500 mg Oral QID Ninoska Odonnell PA-C   500 mg at 01/31/25 0805    metoprolol succinate (TOPROL-XL) 24 hr tablet 50 mg  50 mg Oral Daily Ninoska Odonnell PA-C   50 mg at 01/31/25 0805    naloxone 0.4 mg/mL injection 0.02 mg  0.02 mg Intravenous PRN Arlen Shrestha PA-C        nicotine 14 mg/24 hr 1 patch  1 patch Transdermal Daily Ninoska Odonnell PA-C   1 patch at 01/31/25 0805    ondansetron disintegrating tablet 8 mg  8 mg Oral Q8H PRN Arlen Shrestha PA-C        oxyCODONE immediate release tablet 5 mg  5 mg Oral Q4H PRN Arlen Shrestha PA-C   5 mg at 01/29/25 2120    polyethylene glycol packet 17 g  17 g Oral Daily Arlen Shrestha PA-C   17 g at 01/30/25 1020    sodium chloride 0.9% flush 10 mL  10 mL Intravenous Q12H PRN Arlen Shrestha PA-C            Medication List        START taking these medications      aspirin 81 MG EC tablet  Commonly known as: ECOTRIN  Take 1 tablet (81 mg total) by mouth once daily.  Start taking on: February 1, 2025     calcium carbonate 200 mg calcium (500 mg) chewable tablet  Commonly known as: TUMS  Take 2 tablets (1,000 mg total) by mouth 2 (two) times a day.            CONTINUE taking these medications      ALPRAZolam 0.5 MG tablet  Commonly known as: XANAX  Take 0.5 mg by mouth every 6 (six) hours as needed for Anxiety.     amoxicillin-clavulanate 875-125mg 875-125 mg per tablet  Commonly known as: AUGMENTIN  Take 1 tablet by mouth 2 (two) times daily. for 13 days  Start taking on: February 1, 2025     CENTRUM SILVER ORAL  Take 1 tablet by mouth once daily.     chlorthalidone 25 MG Tab  Commonly known as: HYGROTEN  Take 1 tablet by mouth once daily.     clopidogreL 75 mg tablet  Commonly known as: PLAVIX  Take 75 mg by mouth.     doxycycline 100 MG Cap  Commonly known as: VIBRAMYCIN  Take 1 capsule (100 mg total) by mouth every 12  (twelve) hours. for 13 days  Start taking on: February 1, 2025     EPCLUSA 400-100 mg Tab  Generic drug: sofosbuvir-velpatasvir  Take 1 tablet by mouth.     FISH OIL ORAL  Take 1 capsule by mouth once daily.     gabapentin 400 MG capsule  Commonly known as: NEURONTIN  Take 1 capsule (400 mg total) by mouth 3 (three) times daily     losartan 50 MG tablet  Commonly known as: COZAAR  Take 1 tablet by mouth once daily.     metoprolol succinate 50 MG 24 hr tablet  Commonly known as: TOPROL-XL  Take 50 mg by mouth once daily.     traMADoL 50 mg tablet  Commonly known as: ULTRAM  Take 50 mg by mouth every 8 (eight) hours as needed for Pain.                I have seen and examined this patient within the last 30 days. My clinical findings that support the need for the home health skilled services and home bound status are the following:no   Weakness/numbness causing balance and gait disturbance due to Infection making it taxing to leave home.     Diet:   cardiac diet with Boost Plus Nutritional Drink with every meal and Ke BID    Labs:  Report Lab results to PCP.    Referrals/ Consults  Physical Therapy to evaluate and treat. Evaluate for home safety and equipment needs; Establish/upgrade home exercise program. Perform / instruct on therapeutic exercises, gait training, transfer training, and Range of Motion.  Occupational Therapy to evaluate and treat. Evaluate home environment for safety and equipment needs. Perform/Instruct on transfers, ADL training, ROM, and therapeutic exercises.  Aide to provide assistance with personal care, ADLs, and vital signs.    Activities:   activity as tolerated    Nursing:   Agency to admit patient within 24 hours of hospital discharge unless specified on physician order or at patient request    SN to complete comprehensive assessment including routine vital signs. Instruct on disease process and s/s of complications to report to MD. Review/verify medication list sent home with the  patient at time of discharge  and instruct patient/caregiver as needed. Frequency may be adjusted depending on start of care date.     Skilled nurse to perform up to 3 visits PRN for symptoms related to diagnosis    Notify MD if SBP > 160 or < 90; DBP > 90 or < 50; HR > 120 or < 50; Temp > 101; O2 < 88%;     Ok to schedule additional visits based on staff availability and patient request on consecutive days within the home health episode.    When multiple disciplines ordered:    Start of Care occurs on Sunday - Wednesday schedule remaining discipline evaluations as ordered on separate consecutive days following the start of care.    Thursday SOC -schedule subsequent evaluations Friday and Monday the following week.     Friday - Saturday SOC - schedule subsequent discipline evaluations on consecutive days starting Monday of the following week.    For all post-discharge communication and subsequent orders please contact patient's primary care physician. If unable to reach primary care physician or do not receive response within 30 minutes, please contact PCP for clinical staff order clarification    Miscellaneous   Routine Skin for Bedridden Patients: Instruct patient/caregiver to apply moisture barrier cream to all skin folds and wet areas in perineal area daily and after baths and all bowel movements.    Home Health Aide:  Nursing Three times weekly, Physical Therapy Three times weekly, and Home Health Aide Three times weekly    Wound Care Orders  Bedside nurse assess for acute changes (purulence, increased redness/swelling, increased drainage, malodor, increased pain, pallor, necrosis) please contact physician on any acute changes.     Change dressing to ulcerations q3d and PRN soilage- Cleanse w/Vashe, apply hydrofera ready, cover with foam border dressing   Left lateral AKA - paint with betadine daily.             I certify that this patient is confined to her home and needs intermittent skilled nursing care,  physical therapy, and occupational therapy.

## 2025-01-31 NOTE — CONSULTS
Inpatient consult to Physical Medicine Rehab  Consult performed by: Danni Espino NP  Consult ordered by: Ninoska Odonnell PA-C  Reason for consult: Placement      Consult placed 1/30. Reviewed case with patient's care team and patient appears more appropriate for LTAC with wounds and antibiotics.     LUIS Sue, FNP-C  Physical Medicine & Rehabilitation   01/30/2025

## 2025-01-31 NOTE — PLAN OF CARE
After speaking with pt daughter Mg [phone] , SW explained that pt P2P for inpatient rehab was denied and pt doesn't have any other accepting inpatient rehab facilities at this time. Pt is medically ready for discharge.   Pt daughter agreed for pt to go home with Home health services. Pt is set up with [facility] and they will be providing wound care, therapy and nursing.   SW updated medical team and family.         [facility] - Acute Medical Stepdown  Discharge Reassessment    Primary Care Provider: No, Primary Doctor    Expected Discharge Date: 2/2/2025    Reassessment (most recent)       Discharge Reassessment - 01/31/25 1423          Discharge Reassessment    Assessment Type Discharge Planning Assessment     Did the patient's condition or plan change since previous assessment? Yes     Discharge Plan discussed with: Adult children     Communicated ROYER with patient/caregiver Yes     Discharge Plan A Home Health     Discharge Plan B Home with family     DME Needed Upon Discharge  other (see comments)     Transition of Care Barriers None     Why the patient remains in the hospital Requires continued medical care        Post-Acute Status    Post-Acute Authorization Home Health     Home Health Status Set-up Complete/Auth obtained     Coverage [employer] MANAGED MEDICARE - [employer] [facility] HMO PPO SPECIAL NEEDS     Hospital Resources/Appts/Education Provided Appointments scheduled and added to AVS     Discharge Delays None known at this time                       Discharge Plan A and Plan B have been determined by review of patient's clinical status, future medical and therapeutic needs, and coverage/benefits for post-acute care in coordination with multidisciplinary team members.    Khushboo Diaz MSW, CSW

## 2025-01-31 NOTE — ASSESSMENT & PLAN NOTE
Resolved  Patient's most recent potassium results are listed below.   Recent Labs     01/29/25  0411 01/30/25  0430 01/31/25  0429   K 5.0 4.2 4.0     Plan  - Replete potassium per protocol  - Monitor potassium Daily  - Patient's hypokalemia is stable

## 2025-01-31 NOTE — NURSING
Pt aoox4, c/o pain. NSR on tele. Wound cared per ordered. Side rails upx2, turned frequently, call light within reach.

## 2025-01-31 NOTE — PLAN OF CARE
Patient A&o x4. VSS. Spo2 wnl on RA. Patient frequently reposition herself in bed.  Suprapubic cath in place and collecting yellow urine. Instructed to call staff for assistance. No known needs or concerns at this time. Care is ongoing. Bed is locked and low. Call light it within reach.     Problem: Skin Injury Risk Increased  Goal: Skin Health and Integrity  Outcome: Progressing     Problem: Adult Inpatient Plan of Care  Goal: Plan of Care Review  Outcome: Progressing  Goal: Patient-Specific Goal (Individualized)  Outcome: Progressing  Goal: Absence of Hospital-Acquired Illness or Injury  Outcome: Progressing  Goal: Optimal Comfort and Wellbeing  Outcome: Progressing  Goal: Readiness for Transition of Care  Outcome: Progressing     Problem: Infection  Goal: Absence of Infection Signs and Symptoms  Outcome: Progressing     Problem: Wound  Goal: Optimal Coping  Outcome: Progressing  Goal: Optimal Functional Ability  Outcome: Progressing  Goal: Absence of Infection Signs and Symptoms  Outcome: Progressing  Goal: Improved Oral Intake  Outcome: Progressing  Goal: Optimal Pain Control and Function  Outcome: Progressing  Goal: Skin Health and Integrity  Outcome: Progressing  Goal: Optimal Wound Healing  Outcome: Progressing

## 2025-02-01 NOTE — ASSESSMENT & PLAN NOTE
Pressure injury of the sacral region  Pressure ulcer involving back and right hip   Peripheral vascular disease s/p bilateral AKA  - 65 y.o. F with severe vascular disease presenting to the ED for worsening of vascular and decubitus wounds despite compliance with outpatient antibiotics and wound care as described above  - 2/4 SIRS on admission for tachycardia > 90 and leukocytosis to 14, now resolved   - CTA run-off c/w severe vascular disease and multiple occlusions as described above  - Poor wound healing likely 2/2 severe vascular disease and malnutrition vs. ongoing infection   - Vascular surgery consulted & recommended local wound care  - Given vancomycin & zosyn in the ED  - ID consulted, appreciate recs:   D/c Zosyn  Continue vancomycin (pharmacy to dose) and start Ceftriaxone 2 g q24 hrs IV until blood cultures result negative x 48 hrs.   If negative, can transition to oral Doxycycline 100 mg PO BID and Augmentin 875/125 mg PO BID x 14 days  - General surgery consulted, with 4 cm length eschar, 3 mm in thickness, 1 mm depth debrided from right lateral edge of wound. Mechanical debridement with gauze performed; fibrinous exudate on wound. No further intervention at this time  - wound care HH orders placed

## 2025-02-01 NOTE — ASSESSMENT & PLAN NOTE
S/p CABG x4  Patient with known CAD s/p CABG, which is controlled. Will continue ASA, Plavix, and Statin

## 2025-02-01 NOTE — ASSESSMENT & PLAN NOTE
Patient with acute on chronic debility due to recent bed confinement status from hospitalization and worsening pain to multiple decubitus & vascular wounds. The patient's latest AMPAC (Activity Measure for Post Acute Care) Score is listed below.    AM-PAC Score - How much help does the patient need for each activity listed  Basic Mobility Total Score: 9  Turning over in bed (including adjusting bedclothes, sheets and blankets)?: A lot  Sitting down on and standing up from a chair with arms (e.g., wheelchair, bedside commode, etc.): Unable  Moving from lying on back to sitting on the side of the bed?: A lot  Moving to and from a bed to a chair (including a wheelchair)?: A lot  Need to walk in hospital room?: Unable  Climbing 3-5 steps with a railing?: Unable    Plan  - Progressive mobility protocol initated  - PT/OT consulted & recommending high intensity therapy  - no accepting facilities found  - HH and wound care orders placed

## 2025-02-01 NOTE — ASSESSMENT & PLAN NOTE
Patient's blood pressure range in the last 24 hours was: BP  Min: 106/60  Max: 113/68.The patient's inpatient anti-hypertensive regimen is listed below:  Current Antihypertensives  chlorthalidone tablet 25 mg, Daily, Oral  losartan tablet 50 mg, Daily, Oral  metoprolol succinate (TOPROL-XL) 24 hr tablet 50 mg, Daily, Oral    Plan  - continue home meds

## 2025-02-01 NOTE — DISCHARGE SUMMARY
[facility] - [facility] Medicine  Discharge Summary      Patient Name: Daina Ngo  MRN: [id number]  TITO: [id number]  Patient Class: IP- Inpatient  Admission Date: 1/27/2025  Hospital Length of Stay: 3 days  Discharge Date and Time: 1/31/2025  6:53 PM  Attending Physician: Page att. providers found   Discharging Provider: Cornelia Glasgow PA-C  Primary Care Provider: Page Primary Doctor  [facility] Medicine Team: [facility] MED BB Cornelia Glasgow PA-C  Primary Care Team: [facility] BB    HPI:   Daina Ngo is a 65 y.o. F with severe PAD s/p bilateral AKAs, HTN, HLD, aortic occlusion, hepatitis C, nicotine dependence, and depression/anxiety who presented to the ED for evaluation of multiple wounds. Per chart review, pt was recently admitted for 8-day stay at [facility] for infected decubitus ulcers, where she was treated with broad spectrum antibiotics and transitioned to augmentin and doxycycline at discharge (discharged 1/20). During that admission, she was also found to have an almost totally occluded descending abdominal aorta for which no interventions were performed & also had recurrent urinary retention for which urology placed a suprapubic catheter. She was discharged home with home health but now presents to ED for evaluation given progressively worsening wound pain (predominately to the R stump and buttocks) and debility with new inability to safely care for herself at home. She reports compliance with augmentin and doxycycline regimen at home but believes her wounds are not improving and have become more painful since her discharge. Pt denies fever, chills, chest pain, palpitations, SOB, cough, abdominal pain, N/V/D, HA, or syncope.    In the ED: Mildly tachycardic to 100s and hypotensive to 80s/60s which improved with IVFs o/w VSSAF.  CBC with leukocytosis to 14. CMP with K 2.8, , ALT 59. UA with 38 WBCs 2+ leuk esterase, and occasional yeast. CTA run-off with complete  occlusion of the infrarenal abdominal aorta and iliac arteries, compatible with aortoiliac occlusive disease (Leriche syndrome); Occluded common femoral and superficial femoral arteries bilaterally, with trace collateral opacification of the bilateral deep femoral arteries to each distal AKA stump; patent but critically stenosed proximal SMA; and multiple acute vs subacute pelvic/acetabular fractures. Pt was given vancomycin, zosyn, potassium, morphine, dilaudid, and IVFs and was admitted to hospital medicine for further management.     * No surgery found *      Hospital Course:   Ms. Ngo was admitted for open wound of the RLE and significant PAD with uncontrolled pain. Vascular surgery consulted at admission and unfortunately no revascularization options are available so local wound care was recommended. Pt was initially started on vancomycin and zosyn and subsequently transitioned to PO doxycycline and augmentin for 14 day course per ID recs. Wound care recs given. General surgery consulted with bedside debridement. PT/OT consulted and recommending high intensity therapy but unfortunately patient without any accepting rehab, LTAC or SNF facilities. Patient is stable for discharge home with HH/wound care with remaining oral augmentin/doxy course. ID referral placed. Discussed care plan with patient and daughter, verbalized understanding. All questions answered. Return precautions given.        Goals of Care Treatment Preferences:  Code Status: Full Code         Consults:   Consults (From admission, onward)          Status Ordering Provider     Inpatient consult to General Surgery  Once        Provider:  (Not yet assigned)    Completed JOAN NESBITT     Inpatient consult to Registered Dietitian/Nutritionist  Once        Provider:  (Not yet assigned)    Completed JOAN NESBITT     Inpatient consult to Physical Medicine Rehab  Once        Provider:  (Not yet assigned)    Completed MITRA HERNANDEZ      Inpatient consult to Registered Dietitian/Nutritionist  Once        Provider:  (Not yet assigned)    Completed MITRA HERNANDEZ     Inpatient consult to Infectious Diseases  Once        Provider:  (Not yet assigned)    Completed MITRA HERNANDEZ     [facility] Medicine PharmD Consult  Once        Provider:  (Not yet assigned)    Completed MITRA HERNANDEZ     Inpatient consult to Vascular Surgery  Once        Provider:  (Not yet assigned)    Completed FIDEL STEEN            * Debility  Patient with acute on chronic debility due to recent bed confinement status from hospitalization and worsening pain to multiple decubitus & vascular wounds. The patient's latest AMPAC (Activity Measure for Post Acute Care) Score is listed below.    AM-PAC Score - How much help does the patient need for each activity listed  Basic Mobility Total Score: 9  Turning over in bed (including adjusting bedclothes, sheets and blankets)?: A lot  Sitting down on and standing up from a chair with arms (e.g., wheelchair, bedside commode, etc.): Unable  Moving from lying on back to sitting on the side of the bed?: A lot  Moving to and from a bed to a chair (including a wheelchair)?: A lot  Need to walk in hospital room?: Unable  Climbing 3-5 steps with a railing?: Unable    Plan  - Progressive mobility protocol initated  - PT/OT consulted & recommending high intensity therapy  - no accepting facilities found  - HH and wound care orders placed    Body mass index (BMI) less than 19  - Body mass index is 17.77 kg/m².   - Encourage maximal PO intake and monitor closely for weight changes.  - Nutrition consulted     Aortic occlusion  Leriche syndrome  Femoral artery occlusion  SMA stenosis     - CTA run-off with complete occlusion of the infrarenal abdominal aorta and iliac arteries, compatible with aortoiliac occlusive disease (Leriche syndrome); Occluded common femoral and superficial femoral arteries bilaterally, with trace collateral opacification  of the bilateral deep femoral arteries to each distal AKA stump; patent but critically stenosed proximal SMA  - Vascular surgery consulted & unfortunately no revascularization options are available   - Continue ASA and plavix   - Smoking cessation & secondary prevention discussed at length     Open wound of right lower extremity with complication  Pressure injury of the sacral region  Pressure ulcer involving back and right hip   Peripheral vascular disease s/p bilateral AKA  - 65 y.o. F with severe vascular disease presenting to the ED for worsening of vascular and decubitus wounds despite compliance with outpatient antibiotics and wound care as described above  - 2/4 SIRS on admission for tachycardia > 90 and leukocytosis to 14, now resolved   - CTA run-off c/w severe vascular disease and multiple occlusions as described above  - Poor wound healing likely 2/2 severe vascular disease and malnutrition vs. ongoing infection   - Vascular surgery consulted & recommended local wound care  - Given vancomycin & zosyn in the ED  - ID consulted, appreciate recs:   D/c Zosyn  Continue vancomycin (pharmacy to dose) and start Ceftriaxone 2 g q24 hrs IV until blood cultures result negative x 48 hrs.   If negative, can transition to oral Doxycycline 100 mg PO BID and Augmentin 875/125 mg PO BID x 14 days  - General surgery consulted, with 4 cm length eschar, 3 mm in thickness, 1 mm depth debrided from right lateral edge of wound. Mechanical debridement with gauze performed; fibrinous exudate on wound. No further intervention at this time  - wound care HH orders placed    Cigarette nicotine dependence with nicotine-induced disorder  - Dangers of cigarette smoking were reviewed in detail for ~12 minutes and pt was encouraged to quit.  - Nicotine replacement options discussed and ordered     Benign essential HTN  Patient's blood pressure range in the last 24 hours was: BP  Min: 106/60  Max: 113/68.The patient's inpatient  anti-hypertensive regimen is listed below:  Current Antihypertensives  chlorthalidone tablet 25 mg, Daily, Oral  losartan tablet 50 mg, Daily, Oral  metoprolol succinate (TOPROL-XL) 24 hr tablet 50 mg, Daily, Oral    Plan  - continue home meds    Urinary retention  Suprapubic catheter  - Recurrent issue from recent admission, s/[facility]   - Denies abdominal/pelvic pain, CVA tenderness, drainage difficulties, etc  - UA with 38 WBCs, 2+ leukocyte esterase, and occasional yeast but unclear if UA was obtained prior to catheter exchange  - catheter exchanged  - Ucx with no growth    Transaminitis  Hepatitis C  - , ALT 59 on admission   - Recently started on Epclusa as outpatient for treatment of hepatitis C, which the patient has not started yet  - Avoid hepatotoxic agents as able    Hypokalemia  Resolved  Patient's most recent potassium results are listed below.   Recent Labs     01/30/25  0430 01/31/25  0429   K 4.2 4.0     Plan  - Repleted potassium per protocol  - Patient's hypokalemia is RESOLVED    Peripheral neuropathy  - Chronic issue  - Continue home gabapentin TID - increase as indicated to optimize pain control     Atherosclerotic heart disease of native coronary artery without angina pectoris  S/p CABG x4  Patient with known CAD s/p CABG, which is controlled. Will continue ASA, Plavix, and Statin    Diastolic CHF, chronic  Patient is identified as having Diastolic (HFpEF) heart failure that is Chronic. CHF is currently controlled.   Results for orders placed during the hospital encounter of 01/27/25    Echo    Interpretation Summary    Left Ventricle: The left ventricle is normal in size. Normal wall thickness. Normal wall motion. There is low normal systolic function with a visually estimated ejection fraction of 50 - 55%. There is normal diastolic function.    Right Ventricle: Normal right ventricular cavity size. Wall thickness is normal. Systolic function is  mildly reduced.    Aortic Valve: There is moderate aortic valve sclerosis. There is mild annular calcification present. Mildly restricted motion. There is mild stenosis. Aortic valve area by VTI is 1.8 cm2. Aortic valve peak velocity is 1.8 m/s. Mean gradient is 7 mmHg. The dimensionless index is 0.65. There is moderate aortic regurgitation.    Mitral Valve: There is moderate mitral annular calcification present.    Pulmonary Artery: The estimated pulmonary artery systolic pressure is 21 mmHg.    IVC/SVC: Normal venous pressure at 3 mmHg.  - Continue Beta Blocker ACE/ARB    Mixed hyperlipidemia  - Hold statin in setting of elevated LFTs      Final Active Diagnoses:    Diagnosis Date Noted POA    PRINCIPAL PROBLEM:  Debility [R53.81] 01/28/2025 Yes    Wounds, multiple [T07.XXXA] 01/30/2025 Yes     Chronic    Moderate protein-calorie malnutrition [E44.0] 01/29/2025 Yes    Hypokalemia [E87.6] 01/28/2025 Yes    Transaminitis [R74.01] 01/28/2025 Yes    Urinary retention [R33.9] 01/28/2025 Yes    Suprapubic catheter [Z93.59] 01/28/2025 Not Applicable    Benign essential HTN [I10] 01/28/2025 Yes    Open wound of right lower extremity with complication [S81.801A] 01/28/2025 Yes    Pressure injury of skin of sacral region [L89.159] 01/28/2025 Yes    Aortic occlusion [I70.0] 01/28/2025 Not Applicable    Leriche syndrome [I74.09] 01/28/2025 Yes    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] 01/28/2025 Yes    Superior mesenteric artery stenosis [K55.1] 01/28/2025 Yes    Body mass index (BMI) less than 19 [Z68.1] 01/28/2025 Not Applicable    Pressure ulcer of contiguous region involving back and right hip [L89.40] 12/11/2024 Yes    S/P CABG x 4 [Z95.1] 12/31/2018 Not Applicable    S/P AKA (above knee amputation) bilateral [Z89.611, Z89.612] 08/08/2018 Not Applicable    Peripheral neuropathy [G62.9] 08/02/2018 Yes    Mixed hyperlipidemia [E78.2] 08/02/2018 Yes    Diastolic CHF, chronic [I50.32] 08/02/2018 Yes    Peripheral vascular  disease, unspecified [I73.9] 04/10/2015 Yes    Atherosclerotic heart disease of native coronary artery without angina pectoris [I25.10] 04/10/2015 Yes    Cigarette nicotine dependence with nicotine-induced disorder [F17.219] 04/10/2015 Yes      Problems Resolved During this Admission:       Discharged Condition: stable    Disposition: [place] [facility] [place]    Follow Up:    Patient Instructions:      Ambulatory referral/consult to Infectious Disease   Standing Status: Future   Referral Priority: Routine Referral Type: Consultation   Referral Reason: Specialty Services Required   Requested Specialty: Infectious Diseases   Number of Visits Requested: 1     Diet Cardiac     Notify your health care provider if you experience any of the following:  severe uncontrolled pain     Notify your health care provider if you experience any of the following:  persistent nausea and vomiting or diarrhea     Notify your health care provider if you experience any of the following:  temperature >100.4     Notify your health care provider if you experience any of the following:  redness, tenderness, or signs of infection (pain, swelling, redness, odor or green/yellow discharge around incision site)     Reason for not Ordering Smoking Cessation Referral     Order Specific Question Answer Comments   Reason for not ordering: Not medically appropriate at this time      Activity as tolerated       Significant Diagnostic Studies: Labs: All labs within the past 24 hours have been reviewed    Pending Diagnostic Studies:       None           Medications:  Reconciled Home Medications:      Medication List        START taking these medications      acetaminophen 325 MG tablet  Commonly known as: TYLENOL  Take 2 tablets (650 mg total) by mouth every 8 (eight) hours as needed for Pain.     aspirin 81 MG EC tablet  Commonly known as: ECOTRIN  Take 1 tablet (81 mg total) by mouth once daily.     calcium carbonate 200 mg calcium (500 mg) chewable  tablet  Commonly known as: TUMS  Chew and swallow 2 tablets (1,000 mg total) by mouth 2 (two) times a day.     ibuprofen 400 MG tablet  Commonly known as: ADVIL,MOTRIN  Take 1 tablet (400 mg total) by mouth every 6 (six) hours as needed (pain).     nicotine 14 mg/24 hr  Commonly known as: NICODERM CQ  Place 1 patch onto the skin once daily.            CHANGE how you take these medications      ALPRAZolam 0.5 MG tablet  Commonly known as: XANAX  Take 1 tablet (0.5 mg total) by mouth 3 (three) times daily as needed for Anxiety.  What changed: when to take this            CONTINUE taking these medications      amoxicillin-clavulanate 875-125mg 875-125 mg per tablet  Commonly known as: AUGMENTIN  Take 1 tablet by mouth 2 (two) times daily. for 13 days     CENTRUM SILVER ORAL  Take 1 tablet by mouth once daily.     chlorthalidone 25 MG Tab  Commonly known as: HYGROTEN  Take 1 tablet by mouth once daily.     clopidogreL 75 mg tablet  Commonly known as: PLAVIX  Take 75 mg by mouth.     doxycycline 100 MG Cap  Commonly known as: VIBRAMYCIN  Take 1 capsule (100 mg total) by mouth every 12 (twelve) hours. for 13 days     EPCLUSA 400-100 mg Tab  Generic drug: sofosbuvir-velpatasvir  Take 1 tablet by mouth.     FISH OIL ORAL  Take 1 capsule by mouth once daily.     gabapentin 400 MG capsule  Commonly known as: NEURONTIN  Take 1 capsule (400 mg total) by mouth 3 (three) times daily.     losartan 50 MG tablet  Commonly known as: COZAAR  Take 1 tablet by mouth once daily.     metoprolol succinate 50 MG 24 hr tablet  Commonly known as: TOPROL-XL  Take 50 mg by mouth once daily.     traMADoL 50 mg tablet  Commonly known as: ULTRAM  Take 50 mg by mouth every 8 (eight) hours as needed for Pain.              Indwelling Lines/Drains at time of discharge:   Lines/Drains/Airways       None                   Time spent on the discharge of patient: 35 minutes of the time sent on discharge, including examining the patient, providing  discharge instructions, arranging follow up, and documentation           Cornelia Glasgow PA-C  Department of Hospital Medicine  [facility] - Acute Medical Stepdown

## 2025-02-01 NOTE — ASSESSMENT & PLAN NOTE
Hepatitis C  - , ALT 59 on admission   - Recently started on Epclusa as outpatient for treatment of hepatitis C, which the patient has not started yet  - Avoid hepatotoxic agents as able

## 2025-02-01 NOTE — ASSESSMENT & PLAN NOTE
Patient is identified as having Diastolic (HFpEF) heart failure that is Chronic. CHF is currently controlled.   Results for orders placed during the hospital encounter of 01/27/25    Echo    Interpretation Summary    Left Ventricle: The left ventricle is normal in size. Normal wall thickness. Normal wall motion. There is low normal systolic function with a visually estimated ejection fraction of 50 - 55%. There is normal diastolic function.    Right Ventricle: Normal right ventricular cavity size. Wall thickness is normal. Systolic function is mildly reduced.    Aortic Valve: There is moderate aortic valve sclerosis. There is mild annular calcification present. Mildly restricted motion. There is mild stenosis. Aortic valve area by VTI is 1.8 cm2. Aortic valve peak velocity is 1.8 m/s. Mean gradient is 7 mmHg. The dimensionless index is 0.65. There is moderate aortic regurgitation.    Mitral Valve: There is moderate mitral annular calcification present.    Pulmonary Artery: The estimated pulmonary artery systolic pressure is 21 mmHg.    IVC/SVC: Normal venous pressure at 3 mmHg.  - Continue Beta Blocker ACE/ARB

## 2025-02-01 NOTE — ASSESSMENT & PLAN NOTE
Suprapubic catheter  - Recurrent issue from recent admission, s/p placement of a suprapubic catheter at [facility]   - Denies abdominal/pelvic pain, CVA tenderness, drainage difficulties, etc  - UA with 38 WBCs, 2+ leukocyte esterase, and occasional yeast but unclear if UA was obtained prior to catheter exchange  - catheter exchanged  - Ucx with no growth

## 2025-02-01 NOTE — NURSING
Pt aaox4. NSR on tele. Had 5 BM during the shift. Soiled wound dressings changed per ordered. Suprapubic catheter dressing changed. Pt d/c per md ordered. Transportation set up per CMS. Pharmacy delivered part of her medications at the bedside, and pt's daughter stated she would come back to  the rest of the med from the pharm. Ivs, tele removed. D/C paper and education given to pt and pt's daughter, who stated no question at the time. 1830, pt's daughter packed up all her belongings and left. And pt left with the EMS in a stretcher.

## 2025-02-01 NOTE — ASSESSMENT & PLAN NOTE
Resolved  Patient's most recent potassium results are listed below.   Recent Labs     01/30/25  0430 01/31/25  0429   K 4.2 4.0     Plan  - Repleted potassium per protocol  - Patient's hypokalemia is RESOLVED

## 2025-02-01 NOTE — PLAN OF CARE
[facility] - Acute Medical Stepdown  Discharge Final Note    Primary Care Provider: No, Primary Doctor    Expected Discharge Date: 1/31/2025    Patient discharged to home with [facility] via [facility] transportation.     Patient's bedside nurse and pt notified of the above.    Discharge Plan A and Plan B have been determined by review of patient's clinical status, future medical and therapeutic needs, and coverage/benefits for post-acute care in coordination with multidisciplinary team members.        Final Discharge Note (most recent)       Final Note - 02/01/25 0907          Final Note    Assessment Type Final Discharge Note     Anticipated [place] [facility] [place]     What phone number can be called within the next 1-3 days to see how you are doing after discharge? [phone]     Hospital Resources/Appts/Education Provided Appointments scheduled and added to AVS        Post-Acute Status    Post-Acute Authorization Home Health     Home Health Status Set-up Complete/Auth obtained     Coverage [employer] MANAGED MEDICARE - [employer] [facility] HMO PPO SPECIAL NEEDS -     Discharge Delays None known at this time                     Important Message from Medicare  Important Message from Medicare regarding Discharge Appeal Rights: Other (comments) (The patient is going to LTAC.)     Date IMM was signed: 01/31/25  Time IMM was signed: 1145        No future appointments.    CHW scheduled post-discharge follow-up appointment and information added to AVS.     Khushboo Diaz MSW, CSW

## 2025-02-02 LAB
BACTERIA BLD CULT: NORMAL
BACTERIA BLD CULT: NORMAL

## 2025-02-03 DIAGNOSIS — I74.09 LERICHE SYNDROME: Primary | ICD-10-CM

## 2025-02-04 ENCOUNTER — HOSPITAL ENCOUNTER (INPATIENT)
Facility: HOSPITAL | Age: 65
LOS: 2 days | Discharge: HOSPICE | DRG: 699 | End: 2025-02-07
Attending: STUDENT IN AN ORGANIZED HEALTH CARE EDUCATION/TRAINING PROGRAM | Admitting: HOSPITALIST
Payer: MEDICARE

## 2025-02-04 DIAGNOSIS — R07.9 CHEST PAIN: ICD-10-CM

## 2025-02-04 DIAGNOSIS — R62.7 FAILURE TO THRIVE IN ADULT: ICD-10-CM

## 2025-02-04 DIAGNOSIS — N39.0 URINARY TRACT INFECTION ASSOCIATED WITH CATHETERIZATION OF URINARY TRACT, UNSPECIFIED INDWELLING URINARY CATHETER TYPE, INITIAL ENCOUNTER: Primary | ICD-10-CM

## 2025-02-04 DIAGNOSIS — Z71.89 ACP (ADVANCE CARE PLANNING): ICD-10-CM

## 2025-02-04 DIAGNOSIS — R00.0 TACHYCARDIA: ICD-10-CM

## 2025-02-04 DIAGNOSIS — T83.511A URINARY TRACT INFECTION ASSOCIATED WITH CATHETERIZATION OF URINARY TRACT, UNSPECIFIED INDWELLING URINARY CATHETER TYPE, INITIAL ENCOUNTER: Primary | ICD-10-CM

## 2025-02-04 LAB
ALBUMIN SERPL BCP-MCNC: 2.3 G/DL (ref 3.5–5.2)
ALP SERPL-CCNC: 86 U/L (ref 40–150)
ALT SERPL W/O P-5'-P-CCNC: 60 U/L (ref 10–44)
ANION GAP SERPL CALC-SCNC: 11 MMOL/L (ref 8–16)
AST SERPL-CCNC: 89 U/L (ref 10–40)
BASOPHILS # BLD AUTO: 0.02 K/UL (ref 0–0.2)
BASOPHILS NFR BLD: 0.2 % (ref 0–1.9)
BILIRUB SERPL-MCNC: 0.8 MG/DL (ref 0.1–1)
BUN SERPL-MCNC: 16 MG/DL (ref 8–23)
CALCIUM SERPL-MCNC: 8.5 MG/DL (ref 8.7–10.5)
CHLORIDE SERPL-SCNC: 97 MMOL/L (ref 95–110)
CO2 SERPL-SCNC: 23 MMOL/L (ref 23–29)
CREAT SERPL-MCNC: 0.6 MG/DL (ref 0.5–1.4)
DIFFERENTIAL METHOD BLD: ABNORMAL
EOSINOPHIL # BLD AUTO: 0.1 K/UL (ref 0–0.5)
EOSINOPHIL NFR BLD: 0.4 % (ref 0–8)
ERYTHROCYTE [DISTWIDTH] IN BLOOD BY AUTOMATED COUNT: 14.1 % (ref 11.5–14.5)
EST. GFR  (NO RACE VARIABLE): >60 ML/MIN/1.73 M^2
GLUCOSE SERPL-MCNC: 130 MG/DL (ref 70–110)
HCT VFR BLD AUTO: 36.8 % (ref 37–48.5)
HGB BLD-MCNC: 12.8 G/DL (ref 12–16)
IMM GRANULOCYTES # BLD AUTO: 0.06 K/UL (ref 0–0.04)
IMM GRANULOCYTES NFR BLD AUTO: 0.5 % (ref 0–0.5)
LYMPHOCYTES # BLD AUTO: 2.8 K/UL (ref 1–4.8)
LYMPHOCYTES NFR BLD: 21.9 % (ref 18–48)
MCH RBC QN AUTO: 32 PG (ref 27–31)
MCHC RBC AUTO-ENTMCNC: 34.8 G/DL (ref 32–36)
MCV RBC AUTO: 92 FL (ref 82–98)
MONOCYTES # BLD AUTO: 1.2 K/UL (ref 0.3–1)
MONOCYTES NFR BLD: 9.5 % (ref 4–15)
NEUTROPHILS # BLD AUTO: 8.7 K/UL (ref 1.8–7.7)
NEUTROPHILS NFR BLD: 67.5 % (ref 38–73)
NRBC BLD-RTO: 0 /100 WBC
PLATELET # BLD AUTO: 257 K/UL (ref 150–450)
PMV BLD AUTO: 9.4 FL (ref 9.2–12.9)
POTASSIUM SERPL-SCNC: 3 MMOL/L (ref 3.5–5.1)
PROT SERPL-MCNC: 7.6 G/DL (ref 6–8.4)
RBC # BLD AUTO: 4 M/UL (ref 4–5.4)
SODIUM SERPL-SCNC: 131 MMOL/L (ref 136–145)
WBC # BLD AUTO: 12.81 K/UL (ref 3.9–12.7)

## 2025-02-04 PROCEDURE — 96375 TX/PRO/DX INJ NEW DRUG ADDON: CPT

## 2025-02-04 PROCEDURE — 87389 HIV-1 AG W/HIV-1&-2 AB AG IA: CPT | Performed by: PHYSICIAN ASSISTANT

## 2025-02-04 PROCEDURE — 99285 EMERGENCY DEPT VISIT HI MDM: CPT

## 2025-02-04 PROCEDURE — 80053 COMPREHEN METABOLIC PANEL: CPT | Performed by: EMERGENCY MEDICINE

## 2025-02-04 PROCEDURE — 96361 HYDRATE IV INFUSION ADD-ON: CPT

## 2025-02-04 PROCEDURE — 86803 HEPATITIS C AB TEST: CPT | Performed by: PHYSICIAN ASSISTANT

## 2025-02-04 PROCEDURE — 85025 COMPLETE CBC W/AUTO DIFF WBC: CPT | Performed by: EMERGENCY MEDICINE

## 2025-02-04 PROCEDURE — 96374 THER/PROPH/DIAG INJ IV PUSH: CPT

## 2025-02-04 NOTE — Clinical Note
Diagnosis: Urinary tract infection associated with catheterization of urinary tract, unspecified indwelling urinary catheter type, initial encounter [[id number]]   Future Attending Provider: SINDY MCPHERSON [[id number]]

## 2025-02-05 PROBLEM — N39.0 UTI (URINARY TRACT INFECTION): Status: ACTIVE | Noted: 2025-02-05

## 2025-02-05 PROBLEM — B19.20 HEPATITIS C: Status: ACTIVE | Noted: 2025-02-05

## 2025-02-05 PROBLEM — Z71.89 ACP (ADVANCE CARE PLANNING): Status: ACTIVE | Noted: 2025-02-05

## 2025-02-05 PROBLEM — I10 HYPERTENSION: Status: ACTIVE | Noted: 2025-02-05

## 2025-02-05 PROBLEM — E87.1 HYPONATREMIA: Status: ACTIVE | Noted: 2025-02-05

## 2025-02-05 LAB
AMORPH CRY UR QL COMP ASSIST: ABNORMAL
ANION GAP SERPL CALC-SCNC: 8 MMOL/L (ref 8–16)
BACTERIA #/AREA URNS AUTO: ABNORMAL /HPF
BILIRUB UR QL STRIP: NEGATIVE
BUN SERPL-MCNC: 16 MG/DL (ref 8–23)
CALCIUM SERPL-MCNC: 7.8 MG/DL (ref 8.7–10.5)
CHLORIDE SERPL-SCNC: 99 MMOL/L (ref 95–110)
CLARITY UR REFRACT.AUTO: ABNORMAL
CO2 SERPL-SCNC: 24 MMOL/L (ref 23–29)
COLOR UR AUTO: YELLOW
CREAT SERPL-MCNC: 0.5 MG/DL (ref 0.5–1.4)
EST. GFR  (NO RACE VARIABLE): >60 ML/MIN/1.73 M^2
GLUCOSE SERPL-MCNC: 112 MG/DL (ref 70–110)
GLUCOSE UR QL STRIP: NEGATIVE
HCV AB SERPL QL IA: REACTIVE
HCV RNA SERPL NAA+PROBE-LOG IU: 6.51 LOGIU/ML
HCV RNA SERPL QL NAA+PROBE: DETECTED
HCV RNA SPEC NAA+PROBE-ACNC: ABNORMAL IU/ML
HGB UR QL STRIP: ABNORMAL
HIV 1+2 AB+HIV1 P24 AG SERPL QL IA: NORMAL
HYALINE CASTS UR QL AUTO: 0 /LPF
KETONES UR QL STRIP: NEGATIVE
LEUKOCYTE ESTERASE UR QL STRIP: ABNORMAL
MICROSCOPIC COMMENT: ABNORMAL
NITRITE UR QL STRIP: NEGATIVE
NON-SQ EPI CELLS #/AREA URNS AUTO: 4 /HPF
OHS QRS DURATION: 76 MS
OHS QTC CALCULATION: 482 MS
PH UR STRIP: 6 [PH] (ref 5–8)
POTASSIUM SERPL-SCNC: 3.8 MMOL/L (ref 3.5–5.1)
PROT UR QL STRIP: ABNORMAL
RBC #/AREA URNS AUTO: >100 /HPF (ref 0–4)
SODIUM SERPL-SCNC: 131 MMOL/L (ref 136–145)
SP GR UR STRIP: >1.03 (ref 1–1.03)
SQUAMOUS #/AREA URNS AUTO: 0 /HPF
URN SPEC COLLECT METH UR: ABNORMAL
WBC #/AREA URNS AUTO: >100 /HPF (ref 0–5)

## 2025-02-05 PROCEDURE — 25000003 PHARM REV CODE 250

## 2025-02-05 PROCEDURE — 87086 URINE CULTURE/COLONY COUNT: CPT | Performed by: EMERGENCY MEDICINE

## 2025-02-05 PROCEDURE — 63600175 PHARM REV CODE 636 W HCPCS: Mod: TB,JZ

## 2025-02-05 PROCEDURE — 63600175 PHARM REV CODE 636 W HCPCS

## 2025-02-05 PROCEDURE — 93005 ELECTROCARDIOGRAM TRACING: CPT

## 2025-02-05 PROCEDURE — 81001 URINALYSIS AUTO W/SCOPE: CPT | Performed by: EMERGENCY MEDICINE

## 2025-02-05 PROCEDURE — 87040 BLOOD CULTURE FOR BACTERIA: CPT

## 2025-02-05 PROCEDURE — 87522 HEPATITIS C REVRS TRNSCRPJ: CPT | Performed by: PHYSICIAN ASSISTANT

## 2025-02-05 PROCEDURE — 87106 FUNGI IDENTIFICATION YEAST: CPT | Performed by: EMERGENCY MEDICINE

## 2025-02-05 PROCEDURE — 93010 ELECTROCARDIOGRAM REPORT: CPT | Mod: ,,, | Performed by: INTERNAL MEDICINE

## 2025-02-05 PROCEDURE — 25000003 PHARM REV CODE 250: Performed by: INTERNAL MEDICINE

## 2025-02-05 PROCEDURE — 80048 BASIC METABOLIC PNL TOTAL CA: CPT

## 2025-02-05 PROCEDURE — 11000001 HC ACUTE MED/SURG PRIVATE ROOM

## 2025-02-05 PROCEDURE — 63600175 PHARM REV CODE 636 W HCPCS: Performed by: INTERNAL MEDICINE

## 2025-02-05 PROCEDURE — 36415 COLL VENOUS BLD VENIPUNCTURE: CPT

## 2025-02-05 PROCEDURE — 99497 ADVNCD CARE PLAN 30 MIN: CPT | Mod: ,,, | Performed by: CLINICAL NURSE SPECIALIST

## 2025-02-05 PROCEDURE — 87088 URINE BACTERIA CULTURE: CPT | Performed by: EMERGENCY MEDICINE

## 2025-02-05 RX ORDER — ACETAMINOPHEN 650 MG/20.3ML
650 LIQUID ORAL EVERY 6 HOURS PRN
Status: DISCONTINUED | OUTPATIENT
Start: 2025-02-05 | End: 2025-02-07 | Stop reason: HOSPADM

## 2025-02-05 RX ORDER — HYDROMORPHONE HYDROCHLORIDE 1 MG/ML
0.2 INJECTION, SOLUTION INTRAMUSCULAR; INTRAVENOUS; SUBCUTANEOUS EVERY 6 HOURS PRN
Status: DISCONTINUED | OUTPATIENT
Start: 2025-02-05 | End: 2025-02-07 | Stop reason: HOSPADM

## 2025-02-05 RX ORDER — CEFTRIAXONE 1 G/1
1 INJECTION, POWDER, FOR SOLUTION INTRAMUSCULAR; INTRAVENOUS
Status: DISCONTINUED | OUTPATIENT
Start: 2025-02-05 | End: 2025-02-05

## 2025-02-05 RX ORDER — ACETAMINOPHEN 325 MG/1
650 TABLET ORAL EVERY 4 HOURS PRN
Status: DISCONTINUED | OUTPATIENT
Start: 2025-02-05 | End: 2025-02-05

## 2025-02-05 RX ORDER — IBUPROFEN 200 MG
1 TABLET ORAL DAILY
Status: DISCONTINUED | OUTPATIENT
Start: 2025-02-05 | End: 2025-02-07 | Stop reason: HOSPADM

## 2025-02-05 RX ORDER — NALOXONE HCL 0.4 MG/ML
0.02 VIAL (ML) INJECTION
Status: DISCONTINUED | OUTPATIENT
Start: 2025-02-05 | End: 2025-02-07 | Stop reason: HOSPADM

## 2025-02-05 RX ORDER — IBUPROFEN 200 MG
24 TABLET ORAL
Status: DISCONTINUED | OUTPATIENT
Start: 2025-02-05 | End: 2025-02-07 | Stop reason: HOSPADM

## 2025-02-05 RX ORDER — OXYCODONE HYDROCHLORIDE 5 MG/1
5 TABLET ORAL
Status: COMPLETED | OUTPATIENT
Start: 2025-02-05 | End: 2025-02-05

## 2025-02-05 RX ORDER — CEFTRIAXONE 1 G/1
1 INJECTION, POWDER, FOR SOLUTION INTRAMUSCULAR; INTRAVENOUS
Status: COMPLETED | OUTPATIENT
Start: 2025-02-05 | End: 2025-02-05

## 2025-02-05 RX ORDER — OXYCODONE HYDROCHLORIDE 5 MG/1
5 TABLET ORAL EVERY 6 HOURS PRN
Status: DISCONTINUED | OUTPATIENT
Start: 2025-02-05 | End: 2025-02-07 | Stop reason: HOSPADM

## 2025-02-05 RX ORDER — ASPIRIN 81 MG/1
81 TABLET ORAL DAILY
Status: DISCONTINUED | OUTPATIENT
Start: 2025-02-05 | End: 2025-02-07 | Stop reason: HOSPADM

## 2025-02-05 RX ORDER — GABAPENTIN 400 MG/1
400 CAPSULE ORAL 3 TIMES DAILY
Status: DISCONTINUED | OUTPATIENT
Start: 2025-02-05 | End: 2025-02-05

## 2025-02-05 RX ORDER — HYDROMORPHONE HYDROCHLORIDE 1 MG/ML
0.2 INJECTION, SOLUTION INTRAMUSCULAR; INTRAVENOUS; SUBCUTANEOUS EVERY 6 HOURS PRN
Status: DISCONTINUED | OUTPATIENT
Start: 2025-02-05 | End: 2025-02-05

## 2025-02-05 RX ORDER — IBUPROFEN 200 MG
16 TABLET ORAL
Status: DISCONTINUED | OUTPATIENT
Start: 2025-02-05 | End: 2025-02-07 | Stop reason: HOSPADM

## 2025-02-05 RX ORDER — GABAPENTIN 250 MG/5ML
400 SOLUTION ORAL 3 TIMES DAILY
Status: DISCONTINUED | OUTPATIENT
Start: 2025-02-05 | End: 2025-02-07 | Stop reason: HOSPADM

## 2025-02-05 RX ORDER — CLOPIDOGREL BISULFATE 75 MG/1
75 TABLET ORAL DAILY
Status: DISCONTINUED | OUTPATIENT
Start: 2025-02-06 | End: 2025-02-07 | Stop reason: HOSPADM

## 2025-02-05 RX ORDER — HEPARIN SODIUM 5000 [USP'U]/ML
5000 INJECTION, SOLUTION INTRAVENOUS; SUBCUTANEOUS EVERY 12 HOURS
Status: DISCONTINUED | OUTPATIENT
Start: 2025-02-05 | End: 2025-02-06

## 2025-02-05 RX ORDER — CLOPIDOGREL BISULFATE 75 MG/1
75 TABLET ORAL ONCE
Status: COMPLETED | OUTPATIENT
Start: 2025-02-05 | End: 2025-02-05

## 2025-02-05 RX ORDER — HYDROMORPHONE HYDROCHLORIDE 1 MG/ML
0.5 INJECTION, SOLUTION INTRAMUSCULAR; INTRAVENOUS; SUBCUTANEOUS EVERY 6 HOURS PRN
Status: DISCONTINUED | OUTPATIENT
Start: 2025-02-05 | End: 2025-02-05

## 2025-02-05 RX ORDER — ONDANSETRON HYDROCHLORIDE 2 MG/ML
4 INJECTION, SOLUTION INTRAVENOUS EVERY 8 HOURS PRN
Status: DISCONTINUED | OUTPATIENT
Start: 2025-02-05 | End: 2025-02-07 | Stop reason: HOSPADM

## 2025-02-05 RX ORDER — GLUCAGON 1 MG
1 KIT INJECTION
Status: DISCONTINUED | OUTPATIENT
Start: 2025-02-05 | End: 2025-02-07 | Stop reason: HOSPADM

## 2025-02-05 RX ORDER — SODIUM CHLORIDE 0.9 % (FLUSH) 0.9 %
10 SYRINGE (ML) INJECTION EVERY 12 HOURS PRN
Status: DISCONTINUED | OUTPATIENT
Start: 2025-02-05 | End: 2025-02-07 | Stop reason: HOSPADM

## 2025-02-05 RX ORDER — ACETAMINOPHEN 325 MG/1
650 TABLET ORAL EVERY 6 HOURS PRN
Status: DISCONTINUED | OUTPATIENT
Start: 2025-02-05 | End: 2025-02-05

## 2025-02-05 RX ORDER — OXYCODONE HYDROCHLORIDE 10 MG/1
10 TABLET ORAL EVERY 6 HOURS PRN
Status: ON HOLD | COMMUNITY
End: 2025-02-06 | Stop reason: HOSPADM

## 2025-02-05 RX ADMIN — HEPARIN SODIUM 5000 UNITS: 5000 INJECTION INTRAVENOUS; SUBCUTANEOUS at 09:02

## 2025-02-05 RX ADMIN — SODIUM CHLORIDE 250 ML: 9 INJECTION, SOLUTION INTRAVENOUS at 04:02

## 2025-02-05 RX ADMIN — ASPIRIN 81 MG: 81 TABLET, COATED ORAL at 09:02

## 2025-02-05 RX ADMIN — PIPERACILLIN SODIUM AND TAZOBACTAM SODIUM 4.5 G: 4; .5 INJECTION, POWDER, LYOPHILIZED, FOR SOLUTION INTRAVENOUS at 05:02

## 2025-02-05 RX ADMIN — GABAPENTIN 400 MG: 400 CAPSULE ORAL at 09:02

## 2025-02-05 RX ADMIN — CEFTRIAXONE 1 G: 1 INJECTION, POWDER, FOR SOLUTION INTRAMUSCULAR; INTRAVENOUS at 02:02

## 2025-02-05 RX ADMIN — PIPERACILLIN SODIUM AND TAZOBACTAM SODIUM 4.5 G: 4; .5 INJECTION, POWDER, LYOPHILIZED, FOR SOLUTION INTRAVENOUS at 09:02

## 2025-02-05 RX ADMIN — POTASSIUM BICARBONATE 50 MEQ: 978 TABLET, EFFERVESCENT ORAL at 12:02

## 2025-02-05 RX ADMIN — CLOPIDOGREL BISULFATE 75 MG: 75 TABLET ORAL at 05:02

## 2025-02-05 RX ADMIN — OXYCODONE HYDROCHLORIDE 5 MG: 5 TABLET ORAL at 01:02

## 2025-02-05 RX ADMIN — GABAPENTIN 400 MG: 250 SOLUTION ORAL at 09:02

## 2025-02-05 RX ADMIN — VANCOMYCIN HYDROCHLORIDE 750 MG: 750 INJECTION, POWDER, LYOPHILIZED, FOR SOLUTION INTRAVENOUS at 10:02

## 2025-02-05 RX ADMIN — GABAPENTIN 400 MG: 400 CAPSULE ORAL at 03:02

## 2025-02-05 RX ADMIN — OXYCODONE 5 MG: 5 TABLET ORAL at 09:02

## 2025-02-05 RX ADMIN — OXYCODONE 5 MG: 5 TABLET ORAL at 04:02

## 2025-02-05 RX ADMIN — ACETAMINOPHEN 650 MG: 650 SOLUTION ORAL at 10:02

## 2025-02-05 RX ADMIN — POTASSIUM BICARBONATE 50 MEQ: 978 TABLET, EFFERVESCENT ORAL at 04:02

## 2025-02-05 RX ADMIN — PIPERACILLIN SODIUM AND TAZOBACTAM SODIUM 4.5 G: 4; .5 INJECTION, POWDER, LYOPHILIZED, FOR SOLUTION INTRAVENOUS at 01:02

## 2025-02-05 RX ADMIN — HYDROMORPHONE HYDROCHLORIDE 0.5 MG: 1 INJECTION, SOLUTION INTRAMUSCULAR; INTRAVENOUS; SUBCUTANEOUS at 11:02

## 2025-02-05 NOTE — H&P
[facility] - Emergency Dept  [facility]  History & Physical    Patient Name: Daina Ngo  MRN: [id number]  Patient Class: IP- Inpatient  Admission Date: 2/4/2025  Attending Physician: Galdino Manrique MD   Primary Care Provider: [facility]         Patient information was obtained from patient, relative(s), past medical records, and ER records.     Subjective:     Principal Problem:Wounds, multiple    Chief Complaint:   Chief Complaint   Patient presents with    Supra Pubic Cath issue     Clogged; noticed this am; bilateral BKA        HPI: 65 yoF with pmh of severe PAD s/p bilateral AKA, HTN, HLD, aortic occlusion, Hep C, former smoker, dep/anxiety, HFmrEF, sacral ulcer, RLE/Lle wounds, and suprapubic catheter 2/2 recurrent UTI who presents for suprapubic tube dysfunction and worsening wound pain. She has been hospitalized multiple times recently for wound related problems. Her last admission, vascular surgery was consulted and do not have any intervention to offer. Since this recent discharge, she has gone home and her pain in her RLE has progressed. She has also noticed increased discharge from the RLE wound. She wazs discharged on a 14 day course of doxy and Augmentin, and she reports taking it. They also report discharge from around the suprapubic catheter and it clogging. She denies any fever or chills. Does confirm poor PO intake.    In the ED, she was HDS but tachycardic with intermittently soft BPs. Labs were notable for leukocytosis of 12.8, Na 131, and K of 3. UA consistent with acute infection. The ED was able to flush her suprapubic catheter and it was able to flush. Blood cultures and urine cultures were collected.     Past Medical History:   Diagnosis Date    PAD (peripheral artery disease)        Past Surgical History:   Procedure Laterality Date    LEG AMPUTATION         Review of patient's allergies indicates:  No Known Allergies    No current facility-administered medications on  file prior to encounter.     Current Outpatient Medications on File Prior to Encounter   Medication Sig    acetaminophen (TYLENOL) 325 MG tablet Take 2 tablets (650 mg total) by mouth every 8 (eight) hours as needed for Pain.    ALPRAZolam (XANAX) 0.5 MG tablet Take 1 tablet (0.5 mg total) by mouth 3 (three) times daily as needed for Anxiety.    amoxicillin-clavulanate 875-125mg (AUGMENTIN) 875-125 mg per tablet Take 1 tablet by mouth 2 (two) times daily. for 13 days    aspirin (ECOTRIN) 81 MG EC tablet Take 1 tablet (81 mg total) by mouth once daily.    calcium carbonate (TUMS) 200 mg calcium (500 mg) chewable tablet Chew and swallow 2 tablets (1,000 mg total) by mouth 2 (two) times a day.    chlorthalidone (HYGROTEN) 25 MG Tab Take 1 tablet by mouth once daily.    clopidogreL (PLAVIX) 75 mg tablet Take 75 mg by mouth. (Patient not taking: Reported on 1/28/2025)    docosahexaenoic acid/epa (FISH OIL ORAL) Take 1 capsule by mouth once daily.    doxycycline (VIBRAMYCIN) 100 MG Cap Take 1 capsule (100 mg total) by mouth every 12 (twelve) hours. for 13 days    EPCLUSA 400-100 mg Tab Take 1 tablet by mouth.    gabapentin (NEURONTIN) 400 MG capsule Take 1 capsule (400 mg total) by mouth 3 (three) times daily.    ibuprofen (ADVIL,MOTRIN) 400 MG tablet Take 1 tablet (400 mg total) by mouth every 6 (six) hours as needed (pain).    losartan (COZAAR) 50 MG tablet Take 1 tablet by mouth once daily.    metoprolol succinate (TOPROL-XL) 50 MG 24 hr tablet Take 50 mg by mouth once daily.    multivit-min/folic acid/lutein (CENTRUM SILVER ORAL) Take 1 tablet by mouth once daily.    nicotine (NICODERM CQ) 14 mg/24 hr Place 1 patch onto the skin once daily.    traMADoL (ULTRAM) 50 mg tablet Take 50 mg by mouth every 8 (eight) hours as needed for Pain.     Family History    None       Tobacco Use    Smoking status: Never    Smokeless tobacco: Never   Substance and Sexual Activity    Alcohol use: Yes    Drug use: Not Currently    Sexual  activity: Not on file     Review of Systems   Constitutional:  Negative for chills and fever.   HENT:  Negative for congestion.    Eyes:  Negative for visual disturbance.   Respiratory:  Negative for shortness of breath.    Cardiovascular:  Negative for chest pain.   Gastrointestinal:  Negative for abdominal pain, nausea and vomiting.   Genitourinary:  Negative for pelvic pain.   Skin:  Positive for wound.   Neurological:  Negative for syncope.     Objective:     Vital Signs (Most Recent):  Temp: 98.5 °F (36.9 °C) (02/05/25 0400)  Pulse: 105 (02/05/25 0400)  Resp: 18 (02/05/25 0400)  BP: 118/71 (02/05/25 0400)  SpO2: 97 % (02/05/25 0400) Vital Signs (24h Range):  Temp:  [98.2 °F (36.8 °C)-98.6 °F (37 °C)] 98.5 °F (36.9 °C)  Pulse:  [] 105  Resp:  [16-21] 18  SpO2:  [97 %-99 %] 97 %  BP: ()/(56-88) 118/71     Weight: 41.3 kg (91 lb 0.8 oz)  Body mass index is 17.78 kg/m².     Physical Exam  Constitutional:       General: She is not in acute distress.     Appearance: She is ill-appearing.   HENT:      Head: Normocephalic and atraumatic.      Mouth/Throat:      Pharynx: Oropharynx is clear.   Eyes:      General: No scleral icterus.  Cardiovascular:      Rate and Rhythm: Normal rate and regular rhythm.      Heart sounds: Normal heart sounds.   Pulmonary:      Effort: Pulmonary effort is normal. No respiratory distress.      Breath sounds: Normal breath sounds. No wheezing, rhonchi or rales.   Abdominal:      General: Abdomen is flat.      Palpations: Abdomen is soft.      Tenderness: There is no abdominal tenderness.      Comments: Suprapubic catheter in place with mild purulence noted   Musculoskeletal:         General: Tenderness (RLE AKA site) present.      Comments: Bilateral AKA. RLE wound with exudate. LLE extremity appears to be scabbed over. Sacral wound with granulation tissue.    Skin:     General: Skin is warm and dry.      Findings: Lesion present.   Neurological:      Mental Status: She is alert  and oriented to person, place, and time.   Psychiatric:         Mood and Affect: Mood normal.         Behavior: Behavior normal.                Significant Labs: All pertinent labs within the past 24 hours have been reviewed.  CBC:   Recent Labs   Lab 02/04/25  2315   WBC 12.81*   HGB 12.8   HCT 36.8*        CMP:   Recent Labs   Lab 02/04/25  2315   *   K 3.0*   CL 97   CO2 23   *   BUN 16   CREATININE 0.6   CALCIUM 8.5*   PROT 7.6   ALBUMIN 2.3*   BILITOT 0.8   ALKPHOS 86   AST 89*   ALT 60*   ANIONGAP 11       Significant Imaging: I have reviewed all pertinent imaging results/findings within the past 24 hours.  Assessment/Plan:     * Wounds, multiple  65 yoF with pmh of severe PAD s/p bilateral AKA, HTN, HLD, aortic occlusion, Hep C, former smoker, dep/anxiety, HFmrEF, sacral ulcer, RLE/Lle wounds, and suprapubic catheter 2/2 recurrent UTI who presents for suprapubic tube dysfunction and worsening wound pain. Has had multiple hospitalizations for wound related issues with multiple courses of abx. Last admission vascular was consulted given her severe PAD and they did not have any intervention to offer. She was discharged on doxy and augmentin and despite that her RLE wound appeared infected today, and she also had a UTI. She has been HDS and AF, although BP was on the lower side. Denies any fever chills at home. Labs notable for leukocytosis of 12.8 and Na of 131. On exam, she did appear dry.    PLAN:  -On zosyn and vancomycin, de escalate as appropriate   -ID had recently discharged her on doxycycline and Augmentin for RLE infection  -F/u blood and urine cx  -Wound care consulted  -Palliative care consulted   -S/p 250 cc NS   -Pending GOC conversation, consider GS for debridement   -Holding BP meds  -Palliative care consulted        Hypertension  Patient's blood pressure range in the last 24 hours was: BP  Min: 84/56  Max: 131/73.The patient's inpatient anti-hypertensive regimen is listed  below:  Current Antihypertensives   At home she is prescribed toprol 50, losartan 50, and chlorthalidone    Plan  -With soft BPs in the setting of infection will hold anti-hypertensives  -Resume as appropriate     ACP (advance care planning)  Advance Care Planning    Date: 02/05/2025    Code Status  In light of the patients advanced and life limiting illness,I engaged the the patient and family in a voluntary conversation about the patient's preferences for care  at the very end of life. The patient wishes to have a natural, peaceful death.  Along those lines, the patient does not wish to have CPR or other invasive treatments performed when her heart and/or breathing stops. I communicated to the patient and family that a DNR order would be placed in her medical record to reflect this preference.    A total of 30 min was spent on advance care planning, goals of care discussion, emotional support, formulating and communicating prognosis and exploring burden/benefit of various approaches of treatment. This discussion occurred on a fully voluntary basis with the verbal consent of the patient and/or family.     Patient explained she would not wish to live in a vegetative state and would not want to receive chest compressions.           Hepatitis C  Recently diagnosed and prescribed epclusa, but has opted to not start yet.      UTI (urinary tract infection)  Suprapubic cathter was clogged on arrival. Purulence noted around the catheter and to be clogging it. Flushed successfully in the ED.UA consistent with infection.    -Currently on BSA with vancomycin and zosyn  -F/u urine culture      Hyponatremia  Hyponatremia is likely due to Dehydration/hypovolemia. The patient's most recent sodium results are listed below.  Recent Labs     02/04/25  2315   *     Plan  -Patient and daughter report decreased PO intake and patient dry on exam  -S/p 250 cc NS  -F/u afternoon BMP    Aortic occlusion  -Hx noted      Suprapubic  catheter  Recently placed for recurrent UTIs. Was clogged when patient came in but ED was able to flush it and  got it working properly.      Hypokalemia  Patient's most recent potassium results are listed below.   Recent Labs     02/04/25  2315   K 3.0*     Plan  - Replete potassium per protocol  - Monitor potassium Daily  - Patient's hypokalemia is stable  -S/p repletion in the ED    Peripheral neuropathy  -Continue home gabapentin       Mixed hyperlipidemia  Previously on lipitor but recently diagnosed with Hep C and was held due to elevated LFTs    -Resume lipitor when safe from liver standpoint       PAD (peripheral artery disease)  -Continue ASA and plavix       S/P AKA (above knee amputation) bilateral  -Hx noted        VTE Risk Mitigation (From admission, onward)           Ordered     heparin (porcine) injection 5,000 Units  Every 12 hours         02/05/25 0406     IP VTE HIGH RISK PATIENT  Once         02/05/25 0406     Place sequential compression device  Until discontinued         02/05/25 0406                           Mario Duvall DO  Department of Hospital Medicine  [facility] - [facility] Dept

## 2025-02-05 NOTE — SUBJECTIVE & OBJECTIVE
Past Medical History:   Diagnosis Date    PAD (peripheral artery disease)        Past Surgical History:   Procedure Laterality Date    LEG AMPUTATION         Review of patient's allergies indicates:  No Known Allergies    No current facility-administered medications on file prior to encounter.     Current Outpatient Medications on File Prior to Encounter   Medication Sig    acetaminophen (TYLENOL) 325 MG tablet Take 2 tablets (650 mg total) by mouth every 8 (eight) hours as needed for Pain.    ALPRAZolam (XANAX) 0.5 MG tablet Take 1 tablet (0.5 mg total) by mouth 3 (three) times daily as needed for Anxiety.    amoxicillin-clavulanate 875-125mg (AUGMENTIN) 875-125 mg per tablet Take 1 tablet by mouth 2 (two) times daily. for 13 days    aspirin (ECOTRIN) 81 MG EC tablet Take 1 tablet (81 mg total) by mouth once daily.    calcium carbonate (TUMS) 200 mg calcium (500 mg) chewable tablet Chew and swallow 2 tablets (1,000 mg total) by mouth 2 (two) times a day.    chlorthalidone (HYGROTEN) 25 MG Tab Take 1 tablet by mouth once daily.    clopidogreL (PLAVIX) 75 mg tablet Take 75 mg by mouth. (Patient not taking: Reported on 1/28/2025)    docosahexaenoic acid/epa (FISH OIL ORAL) Take 1 capsule by mouth once daily.    doxycycline (VIBRAMYCIN) 100 MG Cap Take 1 capsule (100 mg total) by mouth every 12 (twelve) hours. for 13 days    EPCLUSA 400-100 mg Tab Take 1 tablet by mouth.    gabapentin (NEURONTIN) 400 MG capsule Take 1 capsule (400 mg total) by mouth 3 (three) times daily.    ibuprofen (ADVIL,MOTRIN) 400 MG tablet Take 1 tablet (400 mg total) by mouth every 6 (six) hours as needed (pain).    losartan (COZAAR) 50 MG tablet Take 1 tablet by mouth once daily.    metoprolol succinate (TOPROL-XL) 50 MG 24 hr tablet Take 50 mg by mouth once daily.    multivit-min/folic acid/lutein (CENTRUM SILVER ORAL) Take 1 tablet by mouth once daily.    nicotine (NICODERM CQ) 14 mg/24 hr Place 1 patch onto the skin once daily.    traMADoL  (ULTRAM) 50 mg tablet Take 50 mg by mouth every 8 (eight) hours as needed for Pain.     Family History    None       Tobacco Use    Smoking status: Never    Smokeless tobacco: Never   Substance and Sexual Activity    Alcohol use: Yes    Drug use: Not Currently    Sexual activity: Not on file     Review of Systems   Constitutional:  Negative for chills and fever.   HENT:  Negative for congestion.    Eyes:  Negative for visual disturbance.   Respiratory:  Negative for shortness of breath.    Cardiovascular:  Negative for chest pain.   Gastrointestinal:  Negative for abdominal pain, nausea and vomiting.   Genitourinary:  Negative for pelvic pain.   Skin:  Positive for wound.   Neurological:  Negative for syncope.     Objective:     Vital Signs (Most Recent):  Temp: 98.5 °F (36.9 °C) (02/05/25 0400)  Pulse: 105 (02/05/25 0400)  Resp: 18 (02/05/25 0400)  BP: 118/71 (02/05/25 0400)  SpO2: 97 % (02/05/25 0400) Vital Signs (24h Range):  Temp:  [98.2 °F (36.8 °C)-98.6 °F (37 °C)] 98.5 °F (36.9 °C)  Pulse:  [] 105  Resp:  [16-21] 18  SpO2:  [97 %-99 %] 97 %  BP: ()/(56-88) 118/71     Weight: 41.3 kg (91 lb 0.8 oz)  Body mass index is 17.78 kg/m².     Physical Exam  Constitutional:       General: She is not in acute distress.     Appearance: She is ill-appearing.   HENT:      Head: Normocephalic and atraumatic.      Mouth/Throat:      Pharynx: Oropharynx is clear.   Eyes:      General: No scleral icterus.  Cardiovascular:      Rate and Rhythm: Normal rate and regular rhythm.      Heart sounds: Normal heart sounds.   Pulmonary:      Effort: Pulmonary effort is normal. No respiratory distress.      Breath sounds: Normal breath sounds. No wheezing, rhonchi or rales.   Abdominal:      General: Abdomen is flat.      Palpations: Abdomen is soft.      Tenderness: There is no abdominal tenderness.      Comments: Suprapubic catheter in place with mild purulence noted   Musculoskeletal:         General: Tenderness (RLE AKA  site) present.      Comments: Bilateral AKA. RLE wound with exudate. LLE extremity appears to be scabbed over. Sacral wound with granulation tissue.    Skin:     General: Skin is warm and dry.      Findings: Lesion present.   Neurological:      Mental Status: She is alert and oriented to person, place, and time.   Psychiatric:         Mood and Affect: Mood normal.         Behavior: Behavior normal.                Significant Labs: All pertinent labs within the past 24 hours have been reviewed.  CBC:   Recent Labs   Lab 02/04/25  2315   WBC 12.81*   HGB 12.8   HCT 36.8*        CMP:   Recent Labs   Lab 02/04/25  2315   *   K 3.0*   CL 97   CO2 23   *   BUN 16   CREATININE 0.6   CALCIUM 8.5*   PROT 7.6   ALBUMIN 2.3*   BILITOT 0.8   ALKPHOS 86   AST 89*   ALT 60*   ANIONGAP 11       Significant Imaging: I have reviewed all pertinent imaging results/findings within the past 24 hours.

## 2025-02-05 NOTE — PLAN OF CARE
02/05/25 1504   Post-Acute Status   Post-Acute Authorization Hospice   Hospice Status Referrals Sent   Discharge Plan   Discharge Plan A Hospice/home     SW faxed referral to [facility] via [employer] for review. SW will follow up as needed.    Sandy Roamn LCSW  Case Management   [facility]   Ext. [phone]

## 2025-02-05 NOTE — ASSESSMENT & PLAN NOTE
Patient's blood pressure range in the last 24 hours was: BP  Min: 84/56  Max: 131/73.The patient's inpatient anti-hypertensive regimen is listed below:  Current Antihypertensives   At home she is prescribed toprol 50, losartan 50, and chlorthalidone    Plan  -With soft BPs in the setting of infection will hold anti-hypertensives  -Resume as appropriate

## 2025-02-05 NOTE — PHARMACY MED REC
"     Admission Medication History     The home medication history was taken by Cheng Regalado.    You may go to "Admission" then "Reconcile Home Medications" tabs to review and/or act upon these items.     The home medication list has been updated by the Pharmacy department.   Please read ALL comments highlighted in yellow.   Please address this information as you see fit.    Feel free to contact us if you have any questions or require assistance.      The medications listed below were removed from the home medication list. Please reorder if appropriate:  Patient reports no longer taking the following medication(s):  ACETAMINOPHEN 325 MG  CALCIUM CARBONATE 875-125 MG  CENTRUM SILVER ORAL  NICOTINE 14 MG/24H PATCH    Medications listed below were obtained from: [employer] and Medical records  PTA Medications   Medication Sig    ALPRAZolam (XANAX) 0.5 MG tablet Take 1 tablet (0.5 mg total) by mouth 3 (three) times daily as needed for Anxiety.      amoxicillin-clavulanate 875-125mg (AUGMENTIN) 875-125 mg per tablet Take 1 tablet by mouth 2 (two) times daily. for 13 days    chlorthalidone (HYGROTEN) 25 MG Tab Take 1 tablet by mouth once daily.    collagenase (SANTYL) ointment Apply topically once daily. Apply topically to the affected area for 21 days.      doxycycline (VIBRAMYCIN) 100 MG Cap Take 1 capsule (100 mg total) by mouth every 12 (twelve) hours. for 13 days      EPCLUSA 400-100 mg Tab Take 1 tablet by mouth.      gabapentin (NEURONTIN) 400 MG capsule Take 1 capsule (400 mg total) by mouth 3 (three) times daily.      ibuprofen (ADVIL,MOTRIN) 400 MG tablet Take 1 tablet (400 mg total) by mouth every 6 (six) hours as needed (pain).      losartan (COZAAR) 50 MG tablet Take 1 tablet by mouth once daily.    metoprolol succinate (TOPROL-XL) 50 MG 24 hr tablet Take 50 mg by mouth once daily.    oxyCODONE (ROXICODONE) 10 mg Tab immediate release tablet Take 10 mg by mouth every 6 (six) hours as needed for " Pain. Max daily amount: 40 mg.    traMADoL (ULTRAM) 50 mg tablet Take 50 mg by mouth every 8 (eight) hours as needed for Pain.      aspirin (ECOTRIN) 81 MG EC tablet Take 1 tablet (81 mg total) by mouth once daily. (Patient not taking: Reported on 2/5/2025)      clopidogreL (PLAVIX) 75 mg tablet Take 75 mg by mouth. (Patient not taking: Reported on 1/28/2025)      docosahexaenoic acid/epa (FISH OIL ORAL) Take 1 capsule by mouth once daily.       Potential issues to be addressed PRIOR TO DISCHARGE  Patient reported not taking the following medications: (ASPIRIN 81 MG, PLAVIX 75 MG). These medications remain on the home medication list. Please address accordingly.     Cheng Regalado  EXT [phone]             .

## 2025-02-05 NOTE — ASSESSMENT & PLAN NOTE
Recently placed for recurrent UTIs. Was clogged when patient came in but ED was able to flush it and  got it working properly.

## 2025-02-05 NOTE — ED PROVIDER NOTES
Encounter Date: 2/4/2025       History     Chief Complaint   Patient presents with    Supra Pubic Cath issue     Clogged; noticed this am; bilateral BKA     64 y/o female w/ evere PAD s/p bilateral BKAs, HTN, HLD, aortic occlusion, hepatitis C, nicotine dependence, and depression/anxiety that presents to the ED due to concerns of Clogged Suprapubic catheter that was noticed this am. Patient is hard to understand during interview. Per daughter, this has been the patient new baseline. However, daughter does report increased lethargy and poor PO intake for the past few days. Today, per daughter, the wound care nurse expressed concern for decreased suprapubic drainage and poor wound healing for which the daughter brought the patient to the ED.  Had a recent episode of diarrhea which the daughter attributes to a  protein supplements that she has been giving the patient. Denies any fevers, chills,  SOB, chest pain, or abdominal pain. Off note, she has had multiple recent admissions where she got treated for infected decubitus ulcers, and urinary retention s/p suprapubic catheter(1/17/2025). She was also found to have a totally occluded descending abdominal aorta for which no interventions were performed. Most recently discharge from this institution where she was treated for open wound of the RLE and significant PAD with uncontrolled pain. Continues to be on Doxycycline and Augmentin, per daughter, patient has been compliant.          Review of patient's allergies indicates:  No Known Allergies  Past Medical History:   Diagnosis Date    PAD (peripheral artery disease)      Past Surgical History:   Procedure Laterality Date    LEG AMPUTATION       No family history on file.  Social History     Tobacco Use    Smoking status: Never    Smokeless tobacco: Never   Substance Use Topics    Alcohol use: Yes    Drug use: Not Currently     Review of Systems   Constitutional:  Positive for appetite change. Negative for chills,  fatigue and fever.   HENT:  Negative for sinus pressure and sore throat.    Respiratory:  Negative for cough, chest tightness and shortness of breath.    Cardiovascular:  Negative for chest pain and palpitations.   Gastrointestinal:  Negative for abdominal pain and constipation.   Genitourinary:  Negative for flank pain.   Musculoskeletal:  Positive for myalgias.   Skin:  Positive for wound. Negative for color change and rash.   Neurological:  Positive for weakness. Negative for dizziness and headaches.   Psychiatric/Behavioral:  Positive for confusion. Negative for agitation.        Physical Exam     Initial Vitals [02/04/25 2059]   BP Pulse Resp Temp SpO2   121/88 108 18 98.6 °F (37 °C) 98 %      MAP       --         Physical Exam    Constitutional: She appears lethargic. She is not diaphoretic. She is cooperative. She appears ill. No distress.   HENT:   Head: Normocephalic and atraumatic.   Eyes: No scleral icterus.   Neck: No tracheal deviation present.   Cardiovascular:    Tachycardia present.   Exam reveals no friction rub.       No murmur heard.  Pulmonary/Chest: No stridor. No respiratory distress. She has no wheezes. She has no rhonchi. She has no rales.   Abdominal: Abdomen is soft. She exhibits no distension. There is abdominal tenderness.   There is  light milky fluid drainage around the incision of the suprapubic catheter.  There is no rebound and no guarding.   Musculoskeletal:      Comments: BKA     Neurological: She appears lethargic.   Skin:   Multiple ulcers noted on examination. Most notably sacral wound and right upper inner thigh, all covered in wound dressing. L KA stump with dry eschar present.         ED Course   Procedures  Labs Reviewed   HEPATITIS C ANTIBODY - Abnormal       Result Value    Hepatitis C Ab Reactive (*)     Narrative:     Release to patient->Immediate   CBC W/ AUTO DIFFERENTIAL - Abnormal    WBC 12.81 (*)     RBC 4.00      Hemoglobin 12.8      Hematocrit 36.8 (*)     MCV 92       MCH 32.0 (*)     MCHC 34.8      RDW 14.1      Platelets 257      MPV 9.4      Immature Granulocytes 0.5      Gran # (ANC) 8.7 (*)     Immature Grans (Abs) 0.06 (*)     Lymph # 2.8      Mono # 1.2 (*)     Eos # 0.1      Baso # 0.02      nRBC 0      Gran % 67.5      Lymph % 21.9      Mono % 9.5      Eosinophil % 0.4      Basophil % 0.2      Differential Method Automated      Narrative:     Release to patient->Immediate   COMPREHENSIVE METABOLIC PANEL - Abnormal    Sodium 131 (*)     Potassium 3.0 (*)     Chloride 97      CO2 23      Glucose 130 (*)     BUN 16      Creatinine 0.6      Calcium 8.5 (*)     Total Protein 7.6      Albumin 2.3 (*)     Total Bilirubin 0.8      Alkaline Phosphatase 86      AST 89 (*)     ALT 60 (*)     eGFR >60.0      Anion Gap 11      Narrative:     Release to patient->Immediate   URINALYSIS, REFLEX TO URINE CULTURE - Abnormal    Specimen UA Urine, Supra Pubic      Color, UA Yellow      Appearance, UA Hazy (*)     pH, UA 6.0      Specific Gravity, UA >1.030 (*)     Protein, UA 2+ (*)     Glucose, UA Negative      Ketones, UA Negative      Bilirubin (UA) Negative      Occult Blood UA 3+ (*)     Nitrite, UA Negative      Leukocytes, UA 3+ (*)     Narrative:     Specimen Source->Urine   URINALYSIS MICROSCOPIC - Abnormal    RBC, UA >100 (*)     WBC, UA >100 (*)     Bacteria Moderate (*)     Squam Epithel, UA 0      Non-Squam Epith 4 (*)     Hyaline Casts, UA 0      Amorphous, UA Rare      Microscopic Comment SEE COMMENT      Narrative:     Specimen Source->Urine   CULTURE, BLOOD   CULTURE, BLOOD   CULTURE, URINE   HIV 1 / 2 ANTIBODY    HIV 1/2 Ag/Ab Non-reactive      Narrative:     Release to patient->Immediate   HEPATITIS C RNA, QUANTITATIVE, PCR          Imaging Results    None          Medications   potassium bicarbonate disintegrating tablet 50 mEq (50 mEq Oral Given 2/5/25 0027)   cefTRIAXone injection 1 g (has no administration in time range)   oxyCODONE immediate release tablet 5 mg  (5 mg Oral Given 2/5/25 0126)     Medical Decision Making   64 y/o female w/ evere PAD s/p bilateral BKAs, HTN, HLD, aortic occlusion, hepatitis C, nicotine dependence,  depression/anxiety, decubitus ulcers, urinary retention s/p SPC (1/17/2025), RLE open wound and occluded descending abdominal aorta that presents to the ED due to concerns of Clogged Suprapubic catheter that was noticed this am. There is also reported increased in lethargy, poor PO intake and poor ulcer healing. Physical exam remarkable for BKA, multiple wound ulcers, and mild tenderness over the suprapubic area.  Differentials include: Acute cystitis vs Obstructive Uropathy vs failure to thrive vs Poor wound healing. Routine labs obtained on initial evaluation.     Labs reviewed. Remarkable for leukocytosis and hypokalemia.  Will add Bcx to work up. Potassium replacement ordered. Suprapubic care ordered. Which was flushed with improved drainage, U/A obtained after.    2:02 am    Reassessment. Still with back pain, gave a one time dose of oxycodone for pain management. U/A reviewed and highly concerning for UTI. Will initiate tx w/ ceftriaxone x1. Will be placed under observation with HM for further management of complicated UTI associated w/ indwelling catheter.     Amount and/or Complexity of Data Reviewed  Labs: ordered. Decision-making details documented in ED Course.    Risk  Prescription drug management.               ED Course as of 02/05/25 0226   Wed Feb 05, 2025   0128 AST(!): 89  LFT's elevated but improved from previous.  [MA]   0128 Potassium(!): 3.0  Replacement ordered.  [MA]      ED Course User Index  [MA] Yumi Moncada MD                           Clinical Impression:  Final diagnoses:  [R00.0] Tachycardia  [R62.7] Failure to thrive in adult  [T83.511A, N39.0] Urinary tract infection associated with catheterization of urinary tract, unspecified indwelling urinary catheter type, initial encounter (Primary)                  Yumi Moncada MD  Resident  02/05/25 2701

## 2025-02-05 NOTE — ED TRIAGE NOTES
Daina Ngo, a 65 y.o. female presents to the ED w/ complaint of suprapubic cath issue. Pt arrives to ED via EMS with co clogged supra pubic cath. Pt's daughter states that pts cath has had little to no output since 11am.     Triage note:  Chief Complaint   Patient presents with    Supra Pubic Cath issue     Clogged; noticed this am; bilateral BKA     Review of patient's allergies indicates:  No Known Allergies  Past Medical History:   Diagnosis Date    PAD (peripheral artery disease)

## 2025-02-05 NOTE — ASSESSMENT & PLAN NOTE
65 yoF with pmh of severe PAD s/p bilateral AKA, HTN, HLD, aortic occlusion, Hep C, former smoker, dep/anxiety, HFmrEF, sacral ulcer, RLE/Lle wounds, and suprapubic catheter 2/2 recurrent UTI who presents for suprapubic tube dysfunction and worsening wound pain. Has had multiple hospitalizations for wound related issues with multiple courses of abx. Last admission vascular was consulted given her severe PAD and they did not have any intervention to offer. She was discharged on doxy and augmentin and despite that her RLE wound appeared infected today, and she also had a UTI. She has been HDS and AF, although BP was on the lower side. Denies any fever chills at home. Labs notable for leukocytosis of 12.8 and Na of 131. On exam, she did appear dry.    PLAN:  -On zosyn and vancomycin, de escalate as appropriate   -ID had recently discharged her on doxycycline and Augmentin for RLE infection  -F/u blood and urine cx  -Wound care consulted  -Palliative care consulted   -S/p 250 cc NS   -Pending GOC conversation, consider GS for debridement   -Holding BP meds  -Palliative care consulted

## 2025-02-05 NOTE — ASSESSMENT & PLAN NOTE
Suprapubic cathter was clogged on arrival. Purulence noted around the catheter and to be clogging it. Flushed successfully in the ED.UA consistent with infection.    -Currently on BSA with vancomycin and zosyn  -F/u urine culture

## 2025-02-05 NOTE — ASSESSMENT & PLAN NOTE
Advance Care Planning     Date: 02/05/2025    Code Status  In light of the patients advanced and life limiting illness,I engaged the the patient and family in a voluntary conversation about the patient's preferences for care  at the very end of life. The patient wishes to have a natural, peaceful death.  Along those lines, the patient does not wish to have CPR or other invasive treatments performed when her heart and/or breathing stops. I communicated to the patient and family that a DNR order would be placed in her medical record to reflect this preference.    A total of 30 min was spent on advance care planning, goals of care discussion, emotional support, formulating and communicating prognosis and exploring burden/benefit of various approaches of treatment. This discussion occurred on a fully voluntary basis with the verbal consent of the patient and/or family.     Patient explained she would not wish to live in a vegetative state and would not want to receive chest compressions.

## 2025-02-05 NOTE — ASSESSMENT & PLAN NOTE
Previously on lipitor but recently diagnosed with Hep C and was held due to elevated LFTs    -Resume lipitor when safe from liver standpoint

## 2025-02-05 NOTE — ASSESSMENT & PLAN NOTE
Patient's most recent potassium results are listed below.   Recent Labs     02/04/25  2315   K 3.0*     Plan  - Replete potassium per protocol  - Monitor potassium Daily  - Patient's hypokalemia is stable  -S/p repletion in the ED

## 2025-02-05 NOTE — PLAN OF CARE
[facility]  Initial Discharge Assessment       Primary Care Provider: No, Primary Doctor    Admission Diagnosis: Tachycardia [R00.0]  Failure to thrive in adult [R62.7]  Chest pain [R07.9]  Urinary tract infection associated with catheterization of urinary tract, unspecified indwelling urinary catheter type, initial encounter [T83.511A, N39.0]    Admission Date: 2/4/2025  Expected Discharge Date: 2/6/2025    Transition of Care Barriers: None    Payor: [employer] MEDICARE / Plan: [employer] HMO PPO SPECIAL NEEDS / Product Type: Medicare Advantage /     Extended Emergency Contact Information  Primary Emergency Contact: Carlos Ngo  Mobile Phone: [phone]  Relation: Daughter  Secondary Emergency Contact: Taniya Ngosinan  Mobile Phone: [phone]  Relation: Daughter    Discharge Plan A: Hospice/home  Discharge Plan B: Hospice/home, Home with family      [facility] - [place], [place] - [address]  [address]  [place] [address]  Phone: [phone] Fax: [phone]    [id number] - [place], [place] - [address]  [address]  [place] [address]  Phone: [phone] Fax: [phone]      Initial Assessment (most recent)       Adult Discharge Assessment - 02/05/25 1448          Discharge Assessment    Assessment Type Discharge Planning Assessment     Confirmed/corrected address, phone number and insurance Yes     Confirmed Demographics Correct on Facesheet     Source of Information family   daughters- Mg and Carlos    Communicated ROYRE with patient/caregiver Yes     People in Home child(clemente), adult     Do you expect to return to your current living situation? Yes     Do you have help at home or someone to help you manage your care at home? Yes     Who are your caregiver(s) and their phone number(s)? daughters- Mg and Carlos     Home Layout Able to live on 1st floor     Equipment Currently Used at Home wheelchair     Do you  currently have service(s) that help you manage your care at home? Yes     Name and Contact number of agency [facility]     Do you take prescription medications? Yes     Do you have prescription coverage? Yes     Do you have any problems affording any of your prescribed medications? No     Is the patient taking medications as prescribed? yes     How do you get to doctors appointments? family or friend will provide     Are you on dialysis? No     Do you take coumadin? No     Discharge Plan A Home with family;Hospice/home     Discharge Plan B Hospice/home;Home with family     Discharge Plan discussed with: Patient;Adult children   daughters- Luis M    Transition of Care Barriers None                 Discharge Assessment obtained from carissa Asencio at patient bedside.    Patient and family had earlier discussions with MD's regarding discharge home with Home Hospice. Patient daughters state they understand patient situation and are in agreement with discharge to home with home hospice. They are requesting a hospital bed and state family will take turn staying with patient 24/7.       After discussions patient family amenable with referral to [facility]  Referral sent through [employer] Per jimenez STEWART

## 2025-02-05 NOTE — ASSESSMENT & PLAN NOTE
Hyponatremia is likely due to Dehydration/hypovolemia. The patient's most recent sodium results are listed below.  Recent Labs     02/04/25  2315   *     Plan  -Patient and daughter report decreased PO intake and patient dry on exam  -S/p 250 cc NS  -F/u afternoon BMP

## 2025-02-05 NOTE — HPI
65 yoF with pmh of severe PAD s/p bilateral AKA, HTN, HLD, aortic occlusion, Hep C, former smoker, dep/anxiety, HFmrEF, sacral ulcer, RLE/Lle wounds, and suprapubic catheter 2/2 recurrent UTI who presents for suprapubic tube dysfunction and worsening wound pain. She has been hospitalized multiple times recently for wound related problems. Her last admission, vascular surgery was consulted and do not have any intervention to offer. Since this recent discharge, she has gone home and her pain in her RLE has progressed. She has also noticed increased discharge from the RLE wound. She wazs discharged on a 14 day course of doxy and Augmentin, and she reports taking it. They also report discharge from around the suprapubic catheter and it clogging. She denies any fever or chills. Does confirm poor PO intake.    In the ED, she was HDS but tachycardic with intermittently soft BPs. Labs were notable for leukocytosis of 12.8, Na 131, and K of 3. UA consistent with acute infection. The ED was able to flush her suprapubic catheter and it was able to flush. Blood cultures and urine cultures were collected.

## 2025-02-05 NOTE — PROGRESS NOTES
"Pharmacokinetic Initial Assessment: IV Vancomycin    Assessment/Plan:    Initiate intravenous vancomycin with loading dose of 1000 mg once followed by a maintenance dose of vancomycin 1000mg IV every 24 hours  Desired empiric serum trough concentration is 10 to 20 mcg/mL  Draw vancomycin trough level 60 min prior to third dose on 2/07/2025 at approximately 0830  Pharmacy will continue to follow and monitor vancomycin.      Please contact pharmacy at extension [phone] with any questions regarding this assessment.     Thank you for the consult,   Galdino Gurrola       Patient brief summary:  Daina Ngo is a 65 y.o. female initiated on antimicrobial therapy with IV Vancomycin for treatment of suspected skin & soft tissue infection    Drug Allergies:   Review of patient's allergies indicates:  No Known Allergies    Actual Body Weight:   41.3kg    Renal Function:   Estimated Creatinine Clearance: 60.9 mL/min (based on SCr of 0.6 mg/dL).,     Dialysis Method (if applicable):  N/A    CBC (last 72 hours):  Recent Labs   Lab Result Units 02/04/25  2315   WBC K/uL 12.81*   Hemoglobin g/dL 12.8   Hematocrit % 36.8*   Platelets K/uL 257   Gran % % 67.5   Lymph % % 21.9   Mono % % 9.5   Eosinophil % % 0.4   Basophil % % 0.2   Differential Method  Automated       Metabolic Panel (last 72 hours):  Recent Labs   Lab Result Units 02/04/25  2315 02/05/25  0041   Sodium mmol/L 131*  --    Potassium mmol/L 3.0*  --    Chloride mmol/L 97  --    CO2 mmol/L 23  --    Glucose mg/dL 130*  --    Glucose, UA   --  Negative   BUN mg/dL 16  --    Creatinine mg/dL 0.6  --    Albumin g/dL 2.3*  --    Total Bilirubin mg/dL 0.8  --    Alkaline Phosphatase U/L 86  --    AST U/L 89*  --    ALT U/L 60*  --        Drug levels (last 3 results):  No results for input(s): "VANCOMYCINRA", "VANCORANDOM", "VANCOMYCINPE", "VANCOPEAK", "VANCOMYCINTR", "VANCOTROUGH" in the last 72 hours.    Microbiologic Results:  Microbiology Results (last 7 days)  "      Procedure Component Value Units Date/Time    Blood culture [[id number]] Collected: 02/05/25 0122    Order Status: Sent Specimen: Blood from Peripheral, Wrist, Left Updated: 02/05/25 0217    Blood culture [[id number]] Collected: 02/05/25 0132    Order Status: Sent Specimen: Blood from Peripheral, Antecubital, Left Updated: 02/05/25 0217    Urine culture [[id number]] Collected: 02/05/25 0041    Order Status: No result Specimen: Urine Updated: 02/05/25 0151

## 2025-02-06 ENCOUNTER — PATIENT OUTREACH (OUTPATIENT)
Dept: ADMINISTRATIVE | Facility: CLINIC | Age: 65
End: 2025-02-06
Payer: MEDICARE

## 2025-02-06 LAB
ALBUMIN SERPL BCP-MCNC: 2.1 G/DL (ref 3.5–5.2)
ALP SERPL-CCNC: 77 U/L (ref 40–150)
ALT SERPL W/O P-5'-P-CCNC: 46 U/L (ref 10–44)
ANION GAP SERPL CALC-SCNC: 8 MMOL/L (ref 8–16)
AST SERPL-CCNC: 66 U/L (ref 10–40)
BASOPHILS # BLD AUTO: 0.02 K/UL (ref 0–0.2)
BASOPHILS NFR BLD: 0.3 % (ref 0–1.9)
BILIRUB SERPL-MCNC: 1 MG/DL (ref 0.1–1)
BUN SERPL-MCNC: 11 MG/DL (ref 8–23)
CALCIUM SERPL-MCNC: 8 MG/DL (ref 8.7–10.5)
CHLORIDE SERPL-SCNC: 100 MMOL/L (ref 95–110)
CO2 SERPL-SCNC: 26 MMOL/L (ref 23–29)
CREAT SERPL-MCNC: 0.5 MG/DL (ref 0.5–1.4)
DIFFERENTIAL METHOD BLD: ABNORMAL
EOSINOPHIL # BLD AUTO: 0 K/UL (ref 0–0.5)
EOSINOPHIL NFR BLD: 0.3 % (ref 0–8)
ERYTHROCYTE [DISTWIDTH] IN BLOOD BY AUTOMATED COUNT: 14.5 % (ref 11.5–14.5)
EST. GFR  (NO RACE VARIABLE): >60 ML/MIN/1.73 M^2
GLUCOSE SERPL-MCNC: 91 MG/DL (ref 70–110)
HCT VFR BLD AUTO: 33.2 % (ref 37–48.5)
HGB BLD-MCNC: 11.2 G/DL (ref 12–16)
IMM GRANULOCYTES # BLD AUTO: 0.02 K/UL (ref 0–0.04)
IMM GRANULOCYTES NFR BLD AUTO: 0.3 % (ref 0–0.5)
LACTATE SERPL-SCNC: 1.4 MMOL/L (ref 0.5–2.2)
LYMPHOCYTES # BLD AUTO: 1.8 K/UL (ref 1–4.8)
LYMPHOCYTES NFR BLD: 24.8 % (ref 18–48)
MAGNESIUM SERPL-MCNC: 1.5 MG/DL (ref 1.6–2.6)
MCH RBC QN AUTO: 32.2 PG (ref 27–31)
MCHC RBC AUTO-ENTMCNC: 33.7 G/DL (ref 32–36)
MCV RBC AUTO: 95 FL (ref 82–98)
MONOCYTES # BLD AUTO: 0.9 K/UL (ref 0.3–1)
MONOCYTES NFR BLD: 12.9 % (ref 4–15)
NEUTROPHILS # BLD AUTO: 4.5 K/UL (ref 1.8–7.7)
NEUTROPHILS NFR BLD: 61.4 % (ref 38–73)
NRBC BLD-RTO: 0 /100 WBC
PHOSPHATE SERPL-MCNC: 2.9 MG/DL (ref 2.7–4.5)
PLATELET # BLD AUTO: 211 K/UL (ref 150–450)
PMV BLD AUTO: 9.5 FL (ref 9.2–12.9)
POTASSIUM SERPL-SCNC: 3.2 MMOL/L (ref 3.5–5.1)
PROT SERPL-MCNC: 6.9 G/DL (ref 6–8.4)
RBC # BLD AUTO: 3.48 M/UL (ref 4–5.4)
SODIUM SERPL-SCNC: 134 MMOL/L (ref 136–145)
WBC # BLD AUTO: 7.29 K/UL (ref 3.9–12.7)

## 2025-02-06 PROCEDURE — 80053 COMPREHEN METABOLIC PANEL: CPT

## 2025-02-06 PROCEDURE — 63600175 PHARM REV CODE 636 W HCPCS

## 2025-02-06 PROCEDURE — 11000001 HC ACUTE MED/SURG PRIVATE ROOM

## 2025-02-06 PROCEDURE — 63600175 PHARM REV CODE 636 W HCPCS: Mod: JZ,TB

## 2025-02-06 PROCEDURE — 83605 ASSAY OF LACTIC ACID: CPT

## 2025-02-06 PROCEDURE — 84100 ASSAY OF PHOSPHORUS: CPT

## 2025-02-06 PROCEDURE — 25000003 PHARM REV CODE 250

## 2025-02-06 PROCEDURE — 85025 COMPLETE CBC W/AUTO DIFF WBC: CPT

## 2025-02-06 PROCEDURE — 36415 COLL VENOUS BLD VENIPUNCTURE: CPT

## 2025-02-06 PROCEDURE — 83735 ASSAY OF MAGNESIUM: CPT

## 2025-02-06 RX ORDER — ENOXAPARIN SODIUM 100 MG/ML
30 INJECTION SUBCUTANEOUS EVERY 24 HOURS
Status: DISCONTINUED | OUTPATIENT
Start: 2025-02-06 | End: 2025-02-07 | Stop reason: HOSPADM

## 2025-02-06 RX ORDER — POTASSIUM CHLORIDE 20 MEQ/1
40 TABLET, EXTENDED RELEASE ORAL ONCE
Status: COMPLETED | OUTPATIENT
Start: 2025-02-06 | End: 2025-02-06

## 2025-02-06 RX ORDER — MAGNESIUM SULFATE 1 G/100ML
1 INJECTION INTRAVENOUS ONCE
Status: COMPLETED | OUTPATIENT
Start: 2025-02-06 | End: 2025-02-06

## 2025-02-06 RX ORDER — ALPRAZOLAM 0.25 MG/1
0.25 TABLET ORAL ONCE
Status: COMPLETED | OUTPATIENT
Start: 2025-02-06 | End: 2025-02-06

## 2025-02-06 RX ORDER — LOPERAMIDE HYDROCHLORIDE 2 MG/1
2 CAPSULE ORAL ONCE
Qty: 1 CAPSULE | Refills: 0 | OUTPATIENT
Start: 2025-02-06 | End: 2025-02-06

## 2025-02-06 RX ORDER — LOPERAMIDE HYDROCHLORIDE 2 MG/1
2 CAPSULE ORAL ONCE
Status: COMPLETED | OUTPATIENT
Start: 2025-02-06 | End: 2025-02-06

## 2025-02-06 RX ADMIN — GABAPENTIN 400 MG: 250 SOLUTION ORAL at 10:02

## 2025-02-06 RX ADMIN — ENOXAPARIN SODIUM 30 MG: 30 INJECTION SUBCUTANEOUS at 05:02

## 2025-02-06 RX ADMIN — OXYCODONE 5 MG: 5 TABLET ORAL at 07:02

## 2025-02-06 RX ADMIN — HYDROMORPHONE HYDROCHLORIDE 0.2 MG: 1 INJECTION, SOLUTION INTRAMUSCULAR; INTRAVENOUS; SUBCUTANEOUS at 08:02

## 2025-02-06 RX ADMIN — ALPRAZOLAM 0.25 MG: 0.25 TABLET ORAL at 09:02

## 2025-02-06 RX ADMIN — ASPIRIN 81 MG: 81 TABLET, COATED ORAL at 09:02

## 2025-02-06 RX ADMIN — OXYCODONE 5 MG: 5 TABLET ORAL at 01:02

## 2025-02-06 RX ADMIN — LOPERAMIDE HYDROCHLORIDE 2 MG: 2 CAPSULE ORAL at 09:02

## 2025-02-06 RX ADMIN — GABAPENTIN 400 MG: 250 SOLUTION ORAL at 03:02

## 2025-02-06 RX ADMIN — PIPERACILLIN SODIUM AND TAZOBACTAM SODIUM 4.5 G: 4; .5 INJECTION, POWDER, LYOPHILIZED, FOR SOLUTION INTRAVENOUS at 05:02

## 2025-02-06 RX ADMIN — MAGNESIUM SULFATE 1 G: 500 INJECTION, SOLUTION INTRAMUSCULAR; INTRAVENOUS at 10:02

## 2025-02-06 RX ADMIN — HEPARIN SODIUM 5000 UNITS: 5000 INJECTION INTRAVENOUS; SUBCUTANEOUS at 09:02

## 2025-02-06 RX ADMIN — CLOPIDOGREL BISULFATE 75 MG: 75 TABLET ORAL at 09:02

## 2025-02-06 RX ADMIN — GABAPENTIN 400 MG: 250 SOLUTION ORAL at 09:02

## 2025-02-06 RX ADMIN — OXYCODONE 5 MG: 5 TABLET ORAL at 10:02

## 2025-02-06 RX ADMIN — HYDROMORPHONE HYDROCHLORIDE 0.2 MG: 1 INJECTION, SOLUTION INTRAMUSCULAR; INTRAVENOUS; SUBCUTANEOUS at 04:02

## 2025-02-06 RX ADMIN — POTASSIUM CHLORIDE 40 MEQ: 1500 TABLET, EXTENDED RELEASE ORAL at 10:02

## 2025-02-06 RX ADMIN — HYDROMORPHONE HYDROCHLORIDE 0.2 MG: 1 INJECTION, SOLUTION INTRAMUSCULAR; INTRAVENOUS; SUBCUTANEOUS at 10:02

## 2025-02-06 NOTE — ASSESSMENT & PLAN NOTE
Patient's blood pressure range in the last 24 hours was: BP  Min: 124/74  Max: 167/73.The patient's inpatient anti-hypertensive regimen is listed below:  Current Antihypertensives   At home she is prescribed toprol 50, losartan 50, and chlorthalidone    DC antihypertenisves  Comfort care

## 2025-02-06 NOTE — PROGRESS NOTES
Vancomycin discontinued. Pharmacy to sign off at this time.    Please call or re-consult if needed.    Shadi Mitchell, Luli  Spectra [phone]

## 2025-02-06 NOTE — PLAN OF CARE
Problem: Skin Injury Risk Increased  Goal: Skin Health and Integrity  Outcome: Progressing     Problem: Adult Inpatient Plan of Care  Goal: Plan of Care Review  Outcome: Progressing  Goal: Patient-Specific Goal (Individualized)  Outcome: Progressing  Goal: Absence of Hospital-Acquired Illness or Injury  Outcome: Progressing  Goal: Optimal Comfort and Wellbeing  Outcome: Progressing  Goal: Readiness for Transition of Care  Outcome: Progressing     Problem: Coping Ineffective  Goal: Effective Coping  Outcome: Progressing     Problem: Wound  Goal: Optimal Coping  Outcome: Progressing  Goal: Optimal Functional Ability  Outcome: Progressing  Goal: Absence of Infection Signs and Symptoms  Outcome: Progressing  Goal: Improved Oral Intake  Outcome: Progressing  Goal: Optimal Pain Control and Function  Outcome: Progressing  Goal: Skin Health and Integrity  Outcome: Progressing  Goal: Optimal Wound Healing  Outcome: Progressing

## 2025-02-06 NOTE — PLAN OF CARE
On call SW asked to assist with this patients discharge. Patient to go home on hospice and needed transportation ordered-awaiting delivery of the hospital bed home.     [facility] reached out to case management and confirmed bed was delivered and to set up transport for 6:30pm. Transportation confirmed for 8:40pm. SHYAM made med team aware.

## 2025-02-06 NOTE — ASSESSMENT & PLAN NOTE
65 yoF with pmh of severe PAD s/p bilateral AKA, HTN, HLD, aortic occlusion, Hep C, former smoker, dep/anxiety, HFmrEF, sacral ulcer, RLE/Lle wounds, and suprapubic catheter 2/2 recurrent UTI who presents for suprapubic tube dysfunction and worsening wound pain. Has had multiple hospitalizations for wound related issues with multiple courses of abx. Last admission vascular was consulted given her severe PAD and they did not have any intervention to offer. She was discharged on doxy and augmentin and despite that her RLE wound appeared infected today, and she also had a UTI. She has been HDS and AF, although BP was on the lower side. Denies any fever chills at home. Labs notable for leukocytosis of 12.8 and Na of 131. On exam, she did appear dry.    PLAN:  - DC abxs likely not improving wounds due to extensive PAD   Comfort care

## 2025-02-06 NOTE — HOSPITAL COURSE
Pt found to be uncomfortable in bed. Palliative medicine consulted. Daughter states that pt has been declining over the last few weeks, versus her baseline of being completely independent; driving, doing house chores, etc. Pt has since developed BLE non healing ulcers, a bedsore, debility, delirium, and infection. The family recognize that pt may not improve from current medical issues. After discussion about poor prognosis, family wishes to pursue hospice and focus on comfort.

## 2025-02-06 NOTE — PLAN OF CARE
02/06/25 1306   Post-Acute Status   Post-Acute Authorization Hospice   Hospice Status Referrals Sent   Discharge Plan   Discharge Plan A Hospice/home     SW sent Hospice Order to [facility] via Epic.    SW called [facility] #[phone], informed staff is 15 mins out to meet with pt's family;y at bedside. Staff confirmed orders received and being reviewed. SW to follow.    Ekaterina #[phone] with [facility] following, completed all paperwork at bedside, expected DME (Hospital Bed) be delivered to pt's home around 5 pm Today. ON- Call CM to follow.    Sandy Roman LCSW  Case Management   [facility]   Ext. [phone]

## 2025-02-06 NOTE — PLAN OF CARE
Problem: Skin Injury Risk Increased  Goal: Skin Health and Integrity  Outcome: Progressing     Problem: Adult Inpatient Plan of Care  Goal: Patient-Specific Goal (Individualized)  Outcome: Progressing     Problem: Adult Inpatient Plan of Care  Goal: Optimal Comfort and Wellbeing  Outcome: Progressing     Problem: Coping Ineffective  Goal: Effective Coping  Outcome: Progressing     Problem: Wound  Goal: Optimal Pain Control and Function  Outcome: Progressing   Patient side lying in bed. NAD noted. Safety measures in place. Family at the bedside.

## 2025-02-06 NOTE — PLAN OF CARE
[facility]  Department of Hospital Medicine  [address] [place] [address]  [phone] after hours  [phone] fax    HOSPICE  ORDERS    02/06/2025    Admit to Hospice:  Home Service     Diagnoses:   Active Hospital Problems    Diagnosis  POA    *Wounds, multiple [T07.XXXA]  Yes     Chronic    Hyponatremia [E87.1]  Yes    UTI (urinary tract infection) [N39.0]  Yes    Hepatitis C [B19.20]  Yes    ACP (advance care planning) [Z71.89]  Not Applicable    Hypertension [I10]  Yes    Suprapubic catheter [Z93.59]  Not Applicable    Aortic occlusion [I70.0]  Not Applicable    Hypokalemia [E87.6]  Yes    S/P AKA (above knee amputation) bilateral [Z89.611, Z89.612]  Not Applicable    Mixed hyperlipidemia [E78.2]  Yes    Peripheral neuropathy [G62.9]  Yes    PAD (peripheral artery disease) [I73.9]  Yes      Resolved Hospital Problems   No resolved problems to display.       Hospice Qualifying Diagnoses: Sepsis       Patient has a life expectancy < 6 months due to: peripheral artery disease       Vital Signs: Routine per Hospice Protocol.    Code Status: DNR    Allergies: Review of patient's allergies indicates:  No Known Allergies    Diet: As tolerated, regular    Activities: As tolerated    Goals of Care Treatment Preferences:  Code Status: DNR          What is most important right now is to focus on symptom/pain control, improvement in condition but with limits to invasive therapies.  Accordingly, we have decided that the best plan to meet the patient's goals includes continuing with treatment.      Nursing: Per Hospice Routine  Alfaro Care: Empty Alfaro bag Q shift and PRN.  Change Alfaro every month.    Routine Skin for Bedridden Patients: Apply moisture barrier cream to all skin folds and   wet areas in perineal area daily and after baths and all bowel movements.    Other Miscellaneous Care: WOUND CARE:  Wound spray or saline for wound cleaning with all dressing changes.     All wounds to be measured with first dressing changes and every week.          Other Wounds:     Vascular    Cleanse bilateral groin/thigh, right greater trochanter, and left buttocks wounds with vashe for antimicrobial properties and pat dry. Apply aquacel AG for antimicrobial and absorptive properties. Cover with mepilex foam border dressing. Change every other day or PRN if soiled.     Cleanse sacrum with vashe for antimicrobial properties and pat dry. Apply triad BID and PRN for autolytic debridement. Cover with ABD pad and secure with medipore tape.     Paint right BKA site with betadine daily for drying and antiseptic properties and leave open to air.      Immerse surface ordered for pressure redistribution and microclimate. Nursing to maintain pressure injury prevention measures. No other issues or concerns at this time. Will follow up 2/13/2025 or sooner if needed.    Medications:        Medication List        CONTINUE taking these medications      ALPRAZolam 0.5 MG tablet  Commonly known as: XANAX  Take 1 tablet (0.5 mg total) by mouth 3 (three) times daily as needed for Anxiety.     collagenase ointment  Commonly known as: SANTYL  Apply topically once daily. Apply topically to the affected area for 21 days.     gabapentin 400 MG capsule  Commonly known as: NEURONTIN  Take 1 capsule (400 mg total) by mouth 3 (three) times daily.            STOP taking these medications      amoxicillin-clavulanate 875-125mg 875-125 mg per tablet  Commonly known as: AUGMENTIN     aspirin 81 MG EC tablet  Commonly known as: ECOTRIN     chlorthalidone 25 MG Tab  Commonly known as: HYGROTEN     clopidogreL 75 mg tablet  Commonly known as: PLAVIX     doxycycline 100 MG Cap  Commonly known as: VIBRAMYCIN     EPCLUSA 400-100 mg Tab  Generic drug: sofosbuvir-velpatasvir     FISH OIL ORAL     ibuprofen 400 MG tablet  Commonly known as: ADVIL,MOTRIN     losartan 50 MG tablet  Commonly known as: COZAAR     metoprolol succinate 50 MG  24 hr tablet  Commonly known as: TOPROL-XL     oxyCODONE 10 mg Tab immediate release tablet  Commonly known as: ROXICODONE     traMADoL 50 mg tablet  Commonly known as: ULTRAM                Future Orders:  Hospice Medical Director may dictate new orders for comfortable care measures & sign death certificate.        _________________________________  Ze Mao MD  02/06/2025

## 2025-02-06 NOTE — DISCHARGE SUMMARY
[facility]  Hospital Medicine  Discharge Summary      Patient Name: Daina Ngo  MRN: [id number]  TITO: [id number]  Patient Class: IP- Inpatient  Admission Date: 2/4/2025  Hospital Length of Stay: 1 days  Discharge Date and Time:  02/06/2025 1:58 PM  Attending Physician: Reinaldo Richardson MD   Discharging Provider: Myles Fuentes MD  Primary Care Provider: Page Primary Doctor  [facility] Medicine Team: [facility] 4 Myles Fuentes MD  Primary Care Team: [facility] 4    HPI:   65 yoF with pmh of severe PAD s/p bilateral AKA, HTN, HLD, aortic occlusion, Hep C, former smoker, dep/anxiety, HFmrEF, sacral ulcer, RLE/Lle wounds, and suprapubic catheter 2/2 recurrent UTI who presents for suprapubic tube dysfunction and worsening wound pain. She has been hospitalized multiple times recently for wound related problems. Her last admission, vascular surgery was consulted and do not have any intervention to offer. Since this recent discharge, she has gone home and her pain in her RLE has progressed. She has also noticed increased discharge from the RLE wound. She wazs discharged on a 14 day course of doxy and Augmentin, and she reports taking it. They also report discharge from around the suprapubic catheter and it clogging. She denies any fever or chills. Does confirm poor PO intake.    In the ED, she was HDS but tachycardic with intermittently soft BPs. Labs were notable for leukocytosis of 12.8, Na 131, and K of 3. UA consistent with acute infection. The ED was able to flush her suprapubic catheter and it was able to flush. Blood cultures and urine cultures were collected.     * No surgery found *      Hospital Course:    Pt found to be uncomfortable in bed. Palliative medicine consulted. Daughter states that pt has been declining over the last few weeks, versus her baseline of being completely independent; driving, doing house chores, etc. Pt has since developed BLE non healing ulcers, a bedsore, debility,  delirium, and infection. The family recognize that pt may not improve from current medical issues. After discussion about poor prognosis, family wishes to pursue hospice and focus on comfort.      Goals of Care Treatment Preferences:  Code Status: DNR          What is most important right now is to focus on symptom/pain control, improvement in condition but with limits to invasive therapies.  Accordingly, we have decided that the best plan to meet the patient's goals includes continuing with treatment.      SDOH Screening:  The patient was screened for utility difficulties, food insecurity, transport difficulties, housing insecurity, and interpersonal safety and there were no concerns identified this admission.     Consults:   Consults (From admission, onward)          Status Ordering Provider     Inpatient consult to Palliative Care  Once        Provider:  (Not yet assigned)    Completed TOBAL, KARINE            * Wounds, multiple  65 yoF with pmh of severe PAD s/p bilateral AKA, HTN, HLD, aortic occlusion, Hep C, former smoker, dep/anxiety, HFmrEF, sacral ulcer, RLE/Lle wounds, and suprapubic catheter 2/2 recurrent UTI who presents for suprapubic tube dysfunction and worsening wound pain. Has had multiple hospitalizations for wound related issues with multiple courses of abx. Last admission vascular was consulted given her severe PAD and they did not have any intervention to offer. She was discharged on doxy and augmentin and despite that her RLE wound appeared infected today, and she also had a UTI. She has been HDS and AF, although BP was on the lower side. Denies any fever chills at home. Labs notable for leukocytosis of 12.8 and Na of 131. On exam, she did appear dry.    PLAN:  - DC abxs likely not improving wounds due to extensive PAD   Comfort care    Hypertension  Patient's blood pressure range in the last 24 hours was: BP  Min: 124/74  Max: 167/73.The patient's inpatient anti-hypertensive regimen is listed  below:  Current Antihypertensives   At home she is prescribed toprol 50, losartan 50, and chlorthalidone    DC antihypertenisves  Comfort care    ACP (advance care planning)  Advance Care Planning    Date: 02/05/2025    Code Status  In light of the patients advanced and life limiting illness,I engaged the the patient and family in a voluntary conversation about the patient's preferences for care  at the very end of life. The patient wishes to have a natural, peaceful death.  Along those lines, the patient does not wish to have CPR or other invasive treatments performed when her heart and/or breathing stops. I communicated to the patient and family that a DNR order would be placed in her medical record to reflect this preference.    A total of 30 min was spent on advance care planning, goals of care discussion, emotional support, formulating and communicating prognosis and exploring burden/benefit of various approaches of treatment. This discussion occurred on a fully voluntary basis with the verbal consent of the patient and/or family.     Patient explained she would not wish to live in a vegetative state and would not want to receive chest compressions.           Hepatitis C  Recently diagnosed and prescribed epclusa, but has opted to not start yet.      UTI (urinary tract infection)  Suprapubic cathter was clogged on arrival. Purulence noted around the catheter and to be clogging it. Flushed successfully in the ED.UA consistent with infection.    -Currently on BSA with vancomycin and zosyn  -F/u urine culture      Hyponatremia  Hyponatremia is likely due to Dehydration/hypovolemia. The patient's most recent sodium results are listed below.  Recent Labs     02/04/25  2315   *     Plan  -Patient and daughter report decreased PO intake and patient dry on exam  -S/p 250 cc NS  -F/u afternoon BMP    Aortic occlusion  -Hx noted      Suprapubic catheter  Recently placed for recurrent UTIs. Was clogged when patient  came in but ED was able to flush it and  got it working properly.      Hypokalemia  Patient's most recent potassium results are listed below.   Recent Labs     02/04/25  2315   K 3.0*     Plan  - Replete potassium per protocol  - Monitor potassium Daily  - Patient's hypokalemia is stable  -S/p repletion in the ED    Peripheral neuropathy  -Continue home gabapentin       Mixed hyperlipidemia  DC lipitor     Comfort care      PAD (peripheral artery disease)  DC home meds    Comfort care      S/P AKA (above knee amputation) bilateral  -Hx noted        Final Active Diagnoses:    Diagnosis Date Noted POA    PRINCIPAL PROBLEM:  Wounds, multiple [T07.XXXA] 01/30/2025 Yes     Chronic    Hyponatremia [E87.1] 02/05/2025 Yes    UTI (urinary tract infection) [N39.0] 02/05/2025 Yes    Hepatitis C [B19.20] 02/05/2025 Yes    ACP (advance care planning) [Z71.89] 02/05/2025 Not Applicable    Hypertension [I10] 02/05/2025 Yes    Suprapubic catheter [Z93.59] 01/28/2025 Not Applicable    Aortic occlusion [I70.0] 01/28/2025 Not Applicable    Hypokalemia [E87.6] 01/28/2025 Yes    S/P AKA (above knee amputation) bilateral [Z89.611, Z89.612] 08/08/2018 Not Applicable    Mixed hyperlipidemia [E78.2] 08/02/2018 Yes    Peripheral neuropathy [G62.9] 08/02/2018 Yes    PAD (peripheral artery disease) [I73.9] 04/10/2015 Yes      Problems Resolved During this Admission:       Discharged Condition: poor    Disposition:     Follow Up:    Patient Instructions:   No discharge procedures on file.    Significant Diagnostic Studies: Labs: All labs within the past 24 hours have been reviewed    Pending Diagnostic Studies:       None           Medications:  Reconciled Home Medications:      Medication List        CONTINUE taking these medications      ALPRAZolam 0.5 MG tablet  Commonly known as: XANAX  Take 1 tablet (0.5 mg total) by mouth 3 (three) times daily as needed for Anxiety.     collagenase ointment  Commonly known as: SANTYL  Apply topically once  daily. Apply topically to the affected area for 21 days.     gabapentin 400 MG capsule  Commonly known as: NEURONTIN  Take 1 capsule (400 mg total) by mouth 3 (three) times daily.            STOP taking these medications      amoxicillin-clavulanate 875-125mg 875-125 mg per tablet  Commonly known as: AUGMENTIN     aspirin 81 MG EC tablet  Commonly known as: ECOTRIN     chlorthalidone 25 MG Tab  Commonly known as: HYGROTEN     clopidogreL 75 mg tablet  Commonly known as: PLAVIX     doxycycline 100 MG Cap  Commonly known as: VIBRAMYCIN     EPCLUSA 400-100 mg Tab  Generic drug: sofosbuvir-velpatasvir     FISH OIL ORAL     ibuprofen 400 MG tablet  Commonly known as: ADVIL,MOTRIN     losartan 50 MG tablet  Commonly known as: COZAAR     metoprolol succinate 50 MG 24 hr tablet  Commonly known as: TOPROL-XL     oxyCODONE 10 mg Tab immediate release tablet  Commonly known as: ROXICODONE     traMADoL 50 mg tablet  Commonly known as: ULTRAM              Indwelling Lines/Drains at time of discharge:   Lines/Drains/Airways       Drain  Duration                  Suprapubic Catheter 02/05/25 0313 16 Fr. 1 day                    Time spent on the discharge of patient: 40 minutes         Myles Fuentes MD  Department of Hospital Medicine  [facility] - Surgery

## 2025-02-06 NOTE — PLAN OF CARE
[facility]  Department of Hospital Medicine  [address] [place] [address]  [phone] after hours  [phone] fax    HOSPICE  ORDERS    02/06/2025    Admit to Hospice:  [facility]  home     Diagnoses:   Active Hospital Problems    Diagnosis  POA    *Wounds, multiple [T07.XXXA]  Yes     Chronic    Hyponatremia [E87.1]  Yes    UTI (urinary tract infection) [N39.0]  Yes    Hepatitis C [B19.20]  Yes    ACP (advance care planning) [Z71.89]  Not Applicable    Hypertension [I10]  Yes    Suprapubic catheter [Z93.59]  Not Applicable    Aortic occlusion [I70.0]  Not Applicable    Hypokalemia [E87.6]  Yes    S/P AKA (above knee amputation) bilateral [Z89.611, Z89.612]  Not Applicable    Mixed hyperlipidemia [E78.2]  Yes    Peripheral neuropathy [G62.9]  Yes    PAD (peripheral artery disease) [I73.9]  Yes      Resolved Hospital Problems   No resolved problems to display.       Hospice Qualifying Diagnoses:        Patient has a life expectancy < 6 months due to:  Primary Hospice Diagnosis: 1. Severe peripheral vascular disease and history of bilateral BKA's. Nonhealing ulcers of her thighs and sacral area Nonhealing ulcers of her thighs and sacral area secondary to #1  Comorbid Conditions Contributing to Decline: History of CAD/CABG History of hep C Hypertension     Vital Signs: Routine per Hospice Protocol.    Code Status: DNR     Allergies: Review of patient's allergies indicates:  No Known Allergies    Diet: Full diet as tolerated    Activities: As tolerated    Goals of Care Treatment Preferences:  Code Status: DNR      Nursing: Per Hospice Routine.      Routine Skin for Bedridden Patients: Apply moisture barrier cream to all skin folds and   wet areas in perineal area daily and after baths and all bowel movements.    Oxygen: not on oxygen     Other Miscellaneous Care:   WOUND CARE:  Wound spray or saline for wound cleaning with all dressing changes.    All wounds to  be measured with first dressing changes and every week.    Pressure Ulcer(s) sacral         Apply Miconzazole:  2% cream    2% powder    Barrier                       Frequency:  Daily   Twice Daily           If incontinent of stool or urine, apply thin layer Barrier cream                   twice daily and PRN to wound         Pressure relief measure:  for pressure redistribution                                                    A/C Boots            Consult ET nurse          -  Wound gel: prn            -  Wet to Damp dressing: prn      Medications:   Must reconcile meds in EPIC first.   Keep all pain medications that patient is currently using so that hospice can correctly titrate pain medication upon arrival.  If the patient is being maintained on IV medications alone for pain in the hospital, the patient should not be transitioned to hospice on PO without conversion prior to discharge to assure proper pain regime.  If the patient needs to go on a PCA or frequent iv opioid boluses, PICC or midline will be needed, and the hospice usually requires most of the day to arrange the PCA and medications with their infusion company and pharmacy.  This should be communicated clearly to the hospice agency prior to discharge, especially since most hospices only have iv Dilaudid or Morphine on formulary.  This becomes even more important if the patient is on IV Fentanyl infusion via PCA in the hospital, as conversions should be made prior to discharge in order trial out efficacy prior to discharge.  If the patient has brain mets or frequent seizures related to the primary hospice diagnosis, be aware that most hospices only have Dilantin and Keppra on formulary.  If the patient is on Vimpat, Lamictal or other AEM's, an alternate plan should be arranged prior to discharge, i.e., conversion to common AEM's vs scheduled benzo's, along with need for Decadron.  If the patient is NPO and getting IV Keppra or Phosphenytoin,  arrangements for this need to be considered if the patient is going out without a line in place and no ability to swallow medications.   If the patient requires anticoagulation as part of their primary hospice diagnosis, be aware that most hospices only have Coumadin on formulary - if NOACs, IV heparin, or therapeutic Lovenox is needed, this will likely not be possible and the family needs to be aware. (If not related to the primary diagnosis, it is covered.)  If the family is expecting iv fluids or to finish out a course of antibiotics iv, this needs to be addressed with the hospice and family.  If needed, an adequate long-term line should be in place for this prior to discharge from the hospital, otherwise the family and patient should be made aware that this will not continue hospice.  It is difficult to start IV's in hospice.  Put STOP dates for all antibiotics as this list does not have stop dates  ***DELETE THIS REMINDER       Medication List        ASK your doctor about these medications      ALPRAZolam 0.5 MG tablet  Commonly known as: XANAX  Take 1 tablet (0.5 mg total) by mouth 3 (three) times daily as needed for Anxiety.     amoxicillin-clavulanate 875-125mg 875-125 mg per tablet  Commonly known as: AUGMENTIN  Take 1 tablet by mouth 2 (two) times daily. for 13 days     aspirin 81 MG EC tablet  Commonly known as: ECOTRIN  Take 1 tablet (81 mg total) by mouth once daily.     chlorthalidone 25 MG Tab  Commonly known as: HYGROTEN  Take 1 tablet by mouth once daily.     clopidogreL 75 mg tablet  Commonly known as: PLAVIX  Take 75 mg by mouth.     collagenase ointment  Commonly known as: SANTYL  Apply topically once daily. Apply topically to the affected area for 21 days.     doxycycline 100 MG Cap  Commonly known as: VIBRAMYCIN  Take 1 capsule (100 mg total) by mouth every 12 (twelve) hours. for 13 days     EPCLUSA 400-100 mg Tab  Generic drug: sofosbuvir-velpatasvir  Take 1 tablet by mouth.     FISH OIL  ORAL  Take 1 capsule by mouth once daily.     gabapentin 400 MG capsule  Commonly known as: NEURONTIN  Take 1 capsule (400 mg total) by mouth 3 (three) times daily.     ibuprofen 400 MG tablet  Commonly known as: ADVIL,MOTRIN  Take 1 tablet (400 mg total) by mouth every 6 (six) hours as needed (pain).     losartan 50 MG tablet  Commonly known as: COZAAR  Take 1 tablet by mouth once daily.     metoprolol succinate 50 MG 24 hr tablet  Commonly known as: TOPROL-XL  Take 50 mg by mouth once daily.     oxyCODONE 10 mg Tab immediate release tablet  Commonly known as: ROXICODONE  Take 10 mg by mouth every 6 (six) hours as needed for Pain. Max daily amount: 40 mg.     traMADoL 50 mg tablet  Commonly known as: ULTRAM  Take 50 mg by mouth every 8 (eight) hours as needed for Pain.                _________________________________  Myles Fuentes MD  02/06/2025

## 2025-02-06 NOTE — CONSULTS
Pal med received consult for goals of care and advanced care planning.      As per conversation with primary team, focus of pal med consult is to develop goals of care.      Advance Care Planning     Date: 02/05/2025    - No ACP documents received.    - Patient did not wish or was not able to name a surrogate decision maker or provide an Advance Care Plan.    - appears patient is able to participate in discussions regarding advanced care planning.   Next of kin are adult daughters   - Carlos Ngo [phone] and  Mg Ngo [phone]    Advance Care Planning     Date: 02/05/2025  Pal med APRN and LCSW met with patient and daughter Carlos  at bedside.   Pal med introduced.   Mrs. Renteria is awake and alert.  She indicates it is her wish to be involved in the conversations.       [facility]  I engaged the patient and family in a voluntary conversation about advance care planning and we specifically addressed what the goals of care .  Daugher states having very good understanding of the current clinical condition.  States having had conversation with the medicine doctors and understands continuous use of anttibiotics will not be sufficient to treat the  infection.  Patient indicates it is her wish to speak with the vascular surgery service. Huber states trusting this team as they did her surgery.  Daughter acknowledges they have not spoken with vascular surgery team and she supports her mother.     We did not specifically address the patient's likely prognosis,  and daughter indicates understanding how difficult it will be to treat this infection.  Daughter indicates hospice has been introduced to the conversation.  Basic hospice education provided.  Daughter states she would like to continuing conversation when sister Mg arrives.  [facility] contact information provided.   We explored the patient's values and preferences for future care.  Daughter indciates  is most important right now is to focus on  continuing with treatment and speaking with the vascular surgery service     Accordingly, we have decided that the best plan to meet the patient's goals includes  continuing the conversation when additional information and family is available     A total of 30  min was spent on advance care planning, goals of care discussion, emotional support, formulating and communicating prognosis and exploring burden/benefit of various approaches of treatment. This discussion occurred on a fully voluntary basis with the verbal consent of the patient and/or family.     Intense emotional support provided to patient and family         Pal med had not been contacted of additional family arrival.  15:56 pal med received message from primary team   Primary team has provided family with additional information and family has made decision for hospice care.      Primary team  is facilitating hospice referrals     Thank you for consult and opportunity to participate in Ms Ngo's care.      Pal med will sign off.  Please re-consult as needed.

## 2025-02-06 NOTE — PLAN OF CARE
Advance Care Planning   [facility] - Surgery  Palliative Care   Psychosocial Assessment    Patient Name: Daina Ngo  MRN: [id number]  Admission Date: 2/4/2025  Hospital Length of Stay: 1 days  Code Status: DNR   Attending Provider: Reinaldo Richardson MD  Palliative Care Provider:   Primary Care Physician: Page, Primary Doctor  Principal Problem:Wounds, multiple    Reason for Referral: assistance with clarification of goals of care  Consult Order Date:   Primary CM/SW:    Present during Interview: patient, relative(s), past medical records, and ER records.      Primary Language:English   Needed: no      Past Medical Situation:   PMH:   Past Medical History:   Diagnosis Date    PAD (peripheral artery disease)      Mental Health/Substance Use History: none disclosed   Risk of Abuse, neglect or exploitation: none identified    Current or Previous Trauma and/or evidence of PTSD: none disclosed   Non-traditional Health practices: none disclosed     Understanding of diagnosis and prognosis: good   Experience/Comfort level with health care system: no issues identified     Patients Mental Status: alert but confused. Per dtr, pt has had periods of delirium as of late.     Socio-Economic Factors/Resources:  Address: [address] [place] [address]  Phone Number: [phone] (home)     Marital Status: Single  Household composition: pt lives with dtr and granddaughter.   Children: 2 dtrs, Trenice and Kenyada    Patient/Family perceptions about Caregiving Needs; availability and capacity: pt/family aware of increased cg needs.    Family Structure, Dynamics/Relationships: pt has good relationship with family.     Patterns/Styles of Communication and Decision-making in the Family: pt's dtrs making decisions on pt's behalf currently.     Patient/Family Coping: appropriate     Activities of Daily Living: max assist   Support Systems-Family & Community (Home Health, HME etc): hospice at discharge      Transportation:  no    Work/Education History: retired     History: no    Financial Resources:Medicare      Advanced Care Planning & Legal Concerns:   Advanced Directives/Living Will: no  LaPOST/POLST: no   Planning:  no    Medical Power of : no     Oral/Written Declaration: no  Witnesses:   Surrogate Decision Maker:     Emergency Contacts:    Spirituality, Culture & Coping Mechanisms:  F- [place] and Belief: No Methodist     I - Importance:     C - Community/Culture Values:     A - Address in Care:       Goals/Hopes/Expectations: improvement in condition  Fears/Anxiety/Concerns: further decline         Preferences about EOL Environment: (own bed, family nearby, pets, music, etc)      Complicated Bereavement Risk Assessment Tool (CBRAT)  Reference:  [facility] Clinical Practice Group (May 2016). Bereavement Risk Screening and Management Guidelines.  Retrieved from: http://www.[employer].com.au/wp-content/uploads//DOQNU-Rodrnrpmkew-Tvpfljzsr-and-Management-Guideline-2016.pdf      Bereaved Client Characteristics   Under 18      no  Was a Twin   no  Young Spouse   no  Elderly Spouse    no  Isolated    no  Lacks Meaningful Social Support   no  Dissatisfied with help available during illness   no  New to [employer] no  New to Decision-Making   no    Illness  Inherited Disorder   no  Stigmatized Disease in the family/community   no  Lengthy/Burdensome   yes     Bereaved Client's History of Loss   Cumulative Multiple Losses   no  Previous Mental Health Illnesses   no  Current Mental Health Illness   no  Other Significant Health Issues   no   Migrant/Refugee   no Will the Death be:  Sudden or Unexpected   no  Traumatic Circumstances Associated with Death   no  Significant Cultural/Social Burdens as a result of Death   no   Relationship with   Profound Lifelong Partner   no  Highly Dependent    no  Antagonistic   no  Ambivalent    no  Deeply Connected   yes  Culturally Defined   yes   Risk Factors Scores  0-2  Low  3-5  Moderate  5+  High  All persons scoring moderate to high presume to be at risk**    (** It is acknowledged that protective factors and resilience may outweigh apparent risk factors.      Total Risk Factors Score:   3, moderate     Advance Care Planning     Date: 02/06/2025    [facility]  I engaged the patient and family in a voluntary conversation about advance care planning and we specifically addressed what the goals of care would be moving forward, in light of the patient's change in clinical status, specifically PVD.  We did not specifically address the patient's likely prognosis, which is  guarded .  We explored the patient's values and preferences for future care.  The patient and family endorses that what is most important right now is to focus on symptom/pain control and improvement in condition but with limits to invasive therapies    Accordingly, we have decided that the best plan to meet the patient's goals includes continuing with treatment    A total of 30  min was spent on advance care planning, goals of care discussion, emotional support, formulating and communicating prognosis and exploring burden/benefit of various approaches of treatment. This discussion occurred on a fully voluntary basis with the verbal consent of the patient and/or family.       Discharge Planning Needs/Plan of Care:     SOREN, along with LUIS , met with pt and dtr. Pt uncomfortable in bed, but refusing repositioning. Introduced palliative medicine and learned about pt as a person. Dtr states that pt has been declining over the last few weeks, versus her baseline of being completely independent; driving, doing house chores, etc. Pt has since developed BLE non healing ulcers, a bedsore, debility, delirium, and infection. Dtr states that she and family recognize that pt may not improve from current medical issues, but would like to get more  information from vascular surgery before making a decision about future care. No questions or concerns for pal med at this time.     Sagrario Flannery, RASHIDAW-BACS, ACHP-SW  Department of Palliative Medicine

## 2025-02-06 NOTE — PLAN OF CARE
02/06/25 0809   Post-Acute Status   Post-Acute Authorization Hospice   Hospice Status Pending equipment/medication delivery   Discharge Plan   Discharge Plan A Hospice/home     Pt referred to [facility], awaiting Hospice Order. SW to contact agency for update on admit status.    Sandy Roman LCSW  Case Management   [facility]   Ext. [phone]

## 2025-02-07 VITALS
TEMPERATURE: 98 F | SYSTOLIC BLOOD PRESSURE: 129 MMHG | BODY MASS INDEX: 17.88 KG/M2 | DIASTOLIC BLOOD PRESSURE: 73 MMHG | OXYGEN SATURATION: 97 % | HEART RATE: 108 BPM | RESPIRATION RATE: 18 BRPM | WEIGHT: 91.06 LBS | HEIGHT: 60 IN

## 2025-02-07 LAB
ALBUMIN SERPL BCP-MCNC: 2.1 G/DL (ref 3.5–5.2)
ALP SERPL-CCNC: 75 U/L (ref 40–150)
ALT SERPL W/O P-5'-P-CCNC: 44 U/L (ref 10–44)
ANION GAP SERPL CALC-SCNC: 8 MMOL/L (ref 8–16)
AST SERPL-CCNC: 64 U/L (ref 10–40)
BACTERIA UR CULT: ABNORMAL
BASOPHILS # BLD AUTO: 0.02 K/UL (ref 0–0.2)
BASOPHILS NFR BLD: 0.4 % (ref 0–1.9)
BILIRUB SERPL-MCNC: 0.7 MG/DL (ref 0.1–1)
BUN SERPL-MCNC: 10 MG/DL (ref 8–23)
CALCIUM SERPL-MCNC: 7.7 MG/DL (ref 8.7–10.5)
CHLORIDE SERPL-SCNC: 101 MMOL/L (ref 95–110)
CO2 SERPL-SCNC: 23 MMOL/L (ref 23–29)
CREAT SERPL-MCNC: 0.5 MG/DL (ref 0.5–1.4)
DIFFERENTIAL METHOD BLD: ABNORMAL
EOSINOPHIL # BLD AUTO: 0 K/UL (ref 0–0.5)
EOSINOPHIL NFR BLD: 0.4 % (ref 0–8)
ERYTHROCYTE [DISTWIDTH] IN BLOOD BY AUTOMATED COUNT: 13.9 % (ref 11.5–14.5)
EST. GFR  (NO RACE VARIABLE): >60 ML/MIN/1.73 M^2
GLUCOSE SERPL-MCNC: 127 MG/DL (ref 70–110)
HCT VFR BLD AUTO: 30.2 % (ref 37–48.5)
HGB BLD-MCNC: 10.1 G/DL (ref 12–16)
IMM GRANULOCYTES # BLD AUTO: 0.02 K/UL (ref 0–0.04)
IMM GRANULOCYTES NFR BLD AUTO: 0.4 % (ref 0–0.5)
LYMPHOCYTES # BLD AUTO: 1.6 K/UL (ref 1–4.8)
LYMPHOCYTES NFR BLD: 29.3 % (ref 18–48)
MAGNESIUM SERPL-MCNC: 1.6 MG/DL (ref 1.6–2.6)
MCH RBC QN AUTO: 31.9 PG (ref 27–31)
MCHC RBC AUTO-ENTMCNC: 33.4 G/DL (ref 32–36)
MCV RBC AUTO: 95 FL (ref 82–98)
MONOCYTES # BLD AUTO: 0.7 K/UL (ref 0.3–1)
MONOCYTES NFR BLD: 12.4 % (ref 4–15)
NEUTROPHILS # BLD AUTO: 3.1 K/UL (ref 1.8–7.7)
NEUTROPHILS NFR BLD: 57.1 % (ref 38–73)
NRBC BLD-RTO: 0 /100 WBC
PHOSPHATE SERPL-MCNC: 2.5 MG/DL (ref 2.7–4.5)
PLATELET # BLD AUTO: 194 K/UL (ref 150–450)
PMV BLD AUTO: 9.6 FL (ref 9.2–12.9)
POTASSIUM SERPL-SCNC: 3.2 MMOL/L (ref 3.5–5.1)
PROT SERPL-MCNC: 6.8 G/DL (ref 6–8.4)
RBC # BLD AUTO: 3.17 M/UL (ref 4–5.4)
SODIUM SERPL-SCNC: 132 MMOL/L (ref 136–145)
WBC # BLD AUTO: 5.47 K/UL (ref 3.9–12.7)

## 2025-02-07 PROCEDURE — 36415 COLL VENOUS BLD VENIPUNCTURE: CPT

## 2025-02-07 PROCEDURE — 85025 COMPLETE CBC W/AUTO DIFF WBC: CPT

## 2025-02-07 PROCEDURE — 80053 COMPREHEN METABOLIC PANEL: CPT

## 2025-02-07 PROCEDURE — 84100 ASSAY OF PHOSPHORUS: CPT

## 2025-02-07 PROCEDURE — 83735 ASSAY OF MAGNESIUM: CPT

## 2025-02-07 NOTE — DISCHARGE SUMMARY
[facility]  Hospital Medicine  Discharge Summary      Patient Name: Daina Ngo  MRN: [id number]  TITO: [id number]  Patient Class: IP- Inpatient  Admission Date: 2/4/2025  Hospital Length of Stay: 2 days  Discharge Date and Time:  02/07/2025 8:00 AM  Attending Physician: Reinaldo Richardson MD   Discharging Provider: Myles Fuentes MD  Primary Care Provider: Page Primary Doctor  [facility] Medicine Team: [facility] 4 Myles Fuentes MD  Primary Care Team: [facility] 4    HPI:   65 yoF with pmh of severe PAD s/p bilateral AKA, HTN, HLD, aortic occlusion, Hep C, former smoker, dep/anxiety, HFmrEF, sacral ulcer, RLE/Lle wounds, and suprapubic catheter 2/2 recurrent UTI who presents for suprapubic tube dysfunction and worsening wound pain. She has been hospitalized multiple times recently for wound related problems. Her last admission, vascular surgery was consulted and do not have any intervention to offer. Since this recent discharge, she has gone home and her pain in her RLE has progressed. She has also noticed increased discharge from the RLE wound. She wazs discharged on a 14 day course of doxy and Augmentin, and she reports taking it. They also report discharge from around the suprapubic catheter and it clogging. She denies any fever or chills. Does confirm poor PO intake.    In the ED, she was HDS but tachycardic with intermittently soft BPs. Labs were notable for leukocytosis of 12.8, Na 131, and K of 3. UA consistent with acute infection. The ED was able to flush her suprapubic catheter and it was able to flush. Blood cultures and urine cultures were collected.     * No surgery found *      Hospital Course:    Pt found to be uncomfortable in bed. Palliative medicine consulted. Daughter states that pt has been declining over the last few weeks, versus her baseline of being completely independent; driving, doing house chores, etc. Pt has since developed BLE non healing ulcers, a bedsore, debility,  delirium, and infection. The family recognize that pt may not improve from current medical issues. After discussion about poor prognosis, family wishes to pursue hospice and focus on comfort.      Goals of Care Treatment Preferences:  Code Status: DNR          What is most important right now is to focus on symptom/pain control, improvement in condition but with limits to invasive therapies.  Accordingly, we have decided that the best plan to meet the patient's goals includes continuing with treatment.      SDOH Screening:  The patient was screened for utility difficulties, food insecurity, transport difficulties, housing insecurity, and interpersonal safety and there were no concerns identified this admission.     Consults:   Consults (From admission, onward)          Status Ordering Provider     Inpatient consult to Palliative Care  Once        Provider:  (Not yet assigned)    Completed TOBAL, KARINE            * Wounds, multiple  65 yoF with pmh of severe PAD s/p bilateral AKA, HTN, HLD, aortic occlusion, Hep C, former smoker, dep/anxiety, HFmrEF, sacral ulcer, RLE/Lle wounds, and suprapubic catheter 2/2 recurrent UTI who presents for suprapubic tube dysfunction and worsening wound pain. Has had multiple hospitalizations for wound related issues with multiple courses of abx. Last admission vascular was consulted given her severe PAD and they did not have any intervention to offer. She was discharged on doxy and augmentin and despite that her RLE wound appeared infected today, and she also had a UTI. She has been HDS and AF, although BP was on the lower side. Denies any fever chills at home. Labs notable for leukocytosis of 12.8 and Na of 131. On exam, she did appear dry.    PLAN:  - DC abxs likely not improving wounds due to extensive PAD   Comfort care    Hypertension  Patient's blood pressure range in the last 24 hours was: BP  Min: 124/74  Max: 167/73.The patient's inpatient anti-hypertensive regimen is listed  below:  Current Antihypertensives   At home she is prescribed toprol 50, losartan 50, and chlorthalidone    DC antihypertenisves  Comfort care    ACP (advance care planning)  Advance Care Planning    Date: 02/05/2025    Code Status  In light of the patients advanced and life limiting illness,I engaged the the patient and family in a voluntary conversation about the patient's preferences for care  at the very end of life. The patient wishes to have a natural, peaceful death.  Along those lines, the patient does not wish to have CPR or other invasive treatments performed when her heart and/or breathing stops. I communicated to the patient and family that a DNR order would be placed in her medical record to reflect this preference.    A total of 30 min was spent on advance care planning, goals of care discussion, emotional support, formulating and communicating prognosis and exploring burden/benefit of various approaches of treatment. This discussion occurred on a fully voluntary basis with the verbal consent of the patient and/or family.     Patient explained she would not wish to live in a vegetative state and would not want to receive chest compressions.           Hepatitis C  Recently diagnosed and prescribed epclusa, but has opted to not start yet.      UTI (urinary tract infection)  Suprapubic cathter was clogged on arrival. Purulence noted around the catheter and to be clogging it. Flushed successfully in the ED.UA consistent with infection.    -Currently on BSA with vancomycin and zosyn  -F/u urine culture      Hyponatremia  Hyponatremia is likely due to Dehydration/hypovolemia. The patient's most recent sodium results are listed below.  Recent Labs     02/04/25  2315   *     Plan  -Patient and daughter report decreased PO intake and patient dry on exam  -S/p 250 cc NS  -F/u afternoon BMP    Aortic occlusion  -Hx noted      Suprapubic catheter  Recently placed for recurrent UTIs. Was clogged when patient  came in but ED was able to flush it and  got it working properly.      Hypokalemia  Patient's most recent potassium results are listed below.   Recent Labs     02/04/25  2315   K 3.0*     Plan  - Replete potassium per protocol  - Monitor potassium Daily  - Patient's hypokalemia is stable  -S/p repletion in the ED    Peripheral neuropathy  -Continue home gabapentin       Mixed hyperlipidemia  DC lipitor     Comfort care      PAD (peripheral artery disease)  DC home meds    Comfort care      S/P AKA (above knee amputation) bilateral  -Hx noted        Final Active Diagnoses:    Diagnosis Date Noted POA    PRINCIPAL PROBLEM:  Wounds, multiple [T07.XXXA] 01/30/2025 Yes     Chronic    Hyponatremia [E87.1] 02/05/2025 Yes    UTI (urinary tract infection) [N39.0] 02/05/2025 Yes    Hepatitis C [B19.20] 02/05/2025 Yes    ACP (advance care planning) [Z71.89] 02/05/2025 Not Applicable    Hypertension [I10] 02/05/2025 Yes    Suprapubic catheter [Z93.59] 01/28/2025 Not Applicable    Aortic occlusion [I70.0] 01/28/2025 Not Applicable    Hypokalemia [E87.6] 01/28/2025 Yes    S/P AKA (above knee amputation) bilateral [Z89.611, Z89.612] 08/08/2018 Not Applicable    Mixed hyperlipidemia [E78.2] 08/02/2018 Yes    Peripheral neuropathy [G62.9] 08/02/2018 Yes    PAD (peripheral artery disease) [I73.9] 04/10/2015 Yes      Problems Resolved During this Admission:       Discharged Condition: poor    Disposition: Hospice/Medical Facility    Follow Up:    Patient Instructions:   No discharge procedures on file.    Significant Diagnostic Studies: Labs: All labs within the past 24 hours have been reviewed    Pending Diagnostic Studies:       None           Medications:  Reconciled Home Medications:      Medication List        CONTINUE taking these medications      ALPRAZolam 0.5 MG tablet  Commonly known as: XANAX  Take 1 tablet (0.5 mg total) by mouth 3 (three) times daily as needed for Anxiety.     collagenase ointment  Commonly known as:  SANTYL  Apply topically once daily. Apply topically to the affected area for 21 days.     gabapentin 400 MG capsule  Commonly known as: NEURONTIN  Take 1 capsule (400 mg total) by mouth 3 (three) times daily.            STOP taking these medications      amoxicillin-clavulanate 875-125mg 875-125 mg per tablet  Commonly known as: AUGMENTIN     aspirin 81 MG EC tablet  Commonly known as: ECOTRIN     chlorthalidone 25 MG Tab  Commonly known as: HYGROTEN     clopidogreL 75 mg tablet  Commonly known as: PLAVIX     doxycycline 100 MG Cap  Commonly known as: VIBRAMYCIN     EPCLUSA 400-100 mg Tab  Generic drug: sofosbuvir-velpatasvir     FISH OIL ORAL     ibuprofen 400 MG tablet  Commonly known as: ADVIL,MOTRIN     losartan 50 MG tablet  Commonly known as: COZAAR     metoprolol succinate 50 MG 24 hr tablet  Commonly known as: TOPROL-XL     oxyCODONE 10 mg Tab immediate release tablet  Commonly known as: ROXICODONE     traMADoL 50 mg tablet  Commonly known as: ULTRAM              Indwelling Lines/Drains at time of discharge:   Lines/Drains/Airways       Drain  Duration                  Suprapubic Catheter 02/05/25 0313 16 Fr. 2 days                    Time spent on the discharge of patient: 30 minutes         Myles Fuentes MD  Department of Hospital Medicine  [facility] - Surgery

## 2025-02-07 NOTE — PLAN OF CARE
Pt discharged home with family via ambulance.  Pt is going home on hospice. Daughter given discharge paperwork. PIV removed by resource nurse.

## 2025-02-07 NOTE — PLAN OF CARE
Problem: Skin Injury Risk Increased  Goal: Skin Health and Integrity  Outcome: Progressing     Problem: Adult Inpatient Plan of Care  Goal: Patient-Specific Goal (Individualized)  Outcome: Progressing     Problem: Adult Inpatient Plan of Care  Goal: Optimal Comfort and Wellbeing  Outcome: Progressing     Problem: Coping Ineffective  Goal: Effective Coping  Outcome: Progressing     Problem: Wound  Goal: Improved Oral Intake  Outcome: Progressing    Patient side lying in bed. NAD noted. No complaints or concerns voiced at this time. Safety measures in place. Family at the bedside.

## 2025-02-07 NOTE — PLAN OF CARE
Problem: Skin Injury Risk Increased  Goal: Skin Health and Integrity  Outcome: Progressing     Problem: Adult Inpatient Plan of Care  Goal: Plan of Care Review  Outcome: Progressing  Goal: Patient-Specific Goal (Individualized)  Outcome: Progressing  Goal: Absence of Hospital-Acquired Illness or Injury  Outcome: Progressing  Goal: Optimal Comfort and Wellbeing  Outcome: Progressing  Goal: Readiness for Transition of Care  Outcome: Progressing     Problem: Coping Ineffective  Goal: Effective Coping  Outcome: Progressing     Problem: Wound  Goal: Optimal Coping  Outcome: Progressing  Goal: Optimal Functional Ability  Outcome: Progressing  Goal: Absence of Infection Signs and Symptoms  Outcome: Progressing  Goal: Improved Oral Intake  Outcome: Progressing  Goal: Optimal Pain Control and Function  Outcome: Progressing  Goal: Skin Health and Integrity  Outcome: Progressing  Goal: Optimal Wound Healing  Outcome: Progressing     Problem: Fall Injury Risk  Goal: Absence of Fall and Fall-Related Injury  Outcome: Progressing

## 2025-02-10 LAB
BACTERIA BLD CULT: NORMAL
BACTERIA BLD CULT: NORMAL

## 2025-02-10 NOTE — PLAN OF CARE
[facility]  Discharge Final Note    Primary Care Provider: No, Primary Doctor    Expected Discharge Date: 2/6/2025    Final Discharge Note (most recent)       Final Note - 02/07/25 0811          Final Note    Assessment Type Final Discharge Note     Anticipated Discharge Disposition Hospice/Home   [facility] Resources/Appts/Education Provided Provided patient/caregiver with written discharge plan information;Provided education on problems/symptoms using teachback                   Future Appointments   Date Time Provider Department Center   2/27/2025  9:00 AM [facility]

## 2025-02-26 ENCOUNTER — RESULTS FOLLOW-UP (OUTPATIENT)
Dept: EMERGENCY MEDICINE | Facility: HOSPITAL | Age: 65
End: 2025-02-26

## 2025-02-26 DIAGNOSIS — B19.20 HEPATITIS C TEST POSITIVE: Primary | ICD-10-CM

## 2025-02-27 ENCOUNTER — TELEPHONE (OUTPATIENT)
Dept: INFECTIOUS DISEASES | Facility: CLINIC | Age: 65
End: 2025-02-27
Payer: MEDICARE

## 2025-02-27 ENCOUNTER — TELEPHONE (OUTPATIENT)
Dept: HEPATOLOGY | Facility: CLINIC | Age: 65
End: 2025-02-27
Payer: MEDICARE

## 2025-02-27 NOTE — TELEPHONE ENCOUNTER
Called patient to reschedule appointment, but no answer  Unable to leave a voice message, mail box is full.

## 2025-02-27 NOTE — TELEPHONE ENCOUNTER
Fabio Srinivasan PA-C ordered that patient be scheduled for a hepatology consult visit for hep c.  Patient hep c quant positive.  Per 2/7/25 discharge note disposition:  Hospice.  Please advise.

## 2025-03-13 ENCOUNTER — PATIENT OUTREACH (OUTPATIENT)
Dept: ADMINISTRATIVE | Facility: OTHER | Age: 65
End: 2025-03-13
Payer: MEDICARE

## 2025-03-13 NOTE — PROGRESS NOTES
CHW - Outreach Attempt    Community Health Worker left a voicemail message for 1st attempt to contact patient regarding: community resources    Community Health Worker to attempt to contact patient on: 3/13/25

## 2025-03-17 ENCOUNTER — PATIENT OUTREACH (OUTPATIENT)
Dept: ADMINISTRATIVE | Facility: OTHER | Age: 65
End: 2025-03-17
Payer: MEDICARE

## 2025-03-17 NOTE — PROGRESS NOTES
CHW - Outreach Attempt    Community Health Worker left a voicemail message for 2nd attempt to contact patient regarding: community resources    Community Health Worker to attempt to contact patient on: 3/17/25

## 2025-03-19 ENCOUNTER — PATIENT OUTREACH (OUTPATIENT)
Dept: ADMINISTRATIVE | Facility: OTHER | Age: 65
End: 2025-03-19
Payer: MEDICARE